# Patient Record
Sex: FEMALE | Race: WHITE
[De-identification: names, ages, dates, MRNs, and addresses within clinical notes are randomized per-mention and may not be internally consistent; named-entity substitution may affect disease eponyms.]

---

## 2019-01-01 ENCOUNTER — APPOINTMENT (OUTPATIENT)
Dept: TRAUMA SURGERY | Facility: CLINIC | Age: 84
End: 2019-01-01
Payer: MEDICARE

## 2019-01-01 ENCOUNTER — APPOINTMENT (OUTPATIENT)
Dept: UROLOGY | Facility: CLINIC | Age: 84
End: 2019-01-01
Payer: MEDICARE

## 2019-01-01 ENCOUNTER — TRANSCRIPTION ENCOUNTER (OUTPATIENT)
Age: 84
End: 2019-01-01

## 2019-01-01 ENCOUNTER — APPOINTMENT (OUTPATIENT)
Dept: UROLOGY | Facility: CLINIC | Age: 84
End: 2019-01-01

## 2019-01-01 ENCOUNTER — INPATIENT (INPATIENT)
Facility: HOSPITAL | Age: 84
LOS: 13 days | Discharge: HOME | DRG: 444 | End: 2019-05-30
Attending: FAMILY MEDICINE | Admitting: FAMILY MEDICINE
Payer: MEDICARE

## 2019-01-01 ENCOUNTER — APPOINTMENT (OUTPATIENT)
Dept: INTERVENTIONAL RADIOLOGY/VASCULAR | Facility: CLINIC | Age: 84
End: 2019-01-01

## 2019-01-01 ENCOUNTER — OUTPATIENT (OUTPATIENT)
Dept: OUTPATIENT SERVICES | Facility: HOSPITAL | Age: 84
LOS: 1 days | End: 2019-01-01
Payer: MEDICARE

## 2019-01-01 ENCOUNTER — RESULT REVIEW (OUTPATIENT)
Age: 84
End: 2019-01-01

## 2019-01-01 ENCOUNTER — EMERGENCY (EMERGENCY)
Facility: HOSPITAL | Age: 84
LOS: 1 days | Discharge: HOME | End: 2019-01-01
Attending: STUDENT IN AN ORGANIZED HEALTH CARE EDUCATION/TRAINING PROGRAM
Payer: MEDICARE

## 2019-01-01 ENCOUNTER — APPOINTMENT (OUTPATIENT)
Dept: TRAUMA SURGERY | Facility: CLINIC | Age: 84
End: 2019-01-01

## 2019-01-01 ENCOUNTER — INPATIENT (INPATIENT)
Facility: HOSPITAL | Age: 84
LOS: 1 days | Discharge: HOSPICE | DRG: 683 | End: 2019-09-05
Attending: FAMILY MEDICINE | Admitting: HOSPITALIST
Payer: MEDICARE

## 2019-01-01 VITALS — DIASTOLIC BLOOD PRESSURE: 64 MMHG | HEART RATE: 89 BPM | SYSTOLIC BLOOD PRESSURE: 94 MMHG

## 2019-01-01 VITALS
SYSTOLIC BLOOD PRESSURE: 108 MMHG | HEART RATE: 92 BPM | DIASTOLIC BLOOD PRESSURE: 59 MMHG | WEIGHT: 149.91 LBS | HEIGHT: 60 IN | TEMPERATURE: 98 F | OXYGEN SATURATION: 99 % | RESPIRATION RATE: 16 BRPM

## 2019-01-01 VITALS
SYSTOLIC BLOOD PRESSURE: 120 MMHG | OXYGEN SATURATION: 99 % | TEMPERATURE: 97.8 F | HEART RATE: 91 BPM | DIASTOLIC BLOOD PRESSURE: 78 MMHG

## 2019-01-01 VITALS
SYSTOLIC BLOOD PRESSURE: 104 MMHG | OXYGEN SATURATION: 97 % | RESPIRATION RATE: 18 BRPM | TEMPERATURE: 98 F | HEART RATE: 97 BPM | DIASTOLIC BLOOD PRESSURE: 56 MMHG | WEIGHT: 139.99 LBS | HEIGHT: 60 IN

## 2019-01-01 VITALS
OXYGEN SATURATION: 94 % | SYSTOLIC BLOOD PRESSURE: 100 MMHG | RESPIRATION RATE: 18 BRPM | HEART RATE: 88 BPM | TEMPERATURE: 99 F | DIASTOLIC BLOOD PRESSURE: 60 MMHG

## 2019-01-01 VITALS — SYSTOLIC BLOOD PRESSURE: 122 MMHG | HEART RATE: 99 BPM | DIASTOLIC BLOOD PRESSURE: 63 MMHG

## 2019-01-01 VITALS
TEMPERATURE: 98.2 F | SYSTOLIC BLOOD PRESSURE: 108 MMHG | OXYGEN SATURATION: 99 % | DIASTOLIC BLOOD PRESSURE: 71 MMHG | RESPIRATION RATE: 14 BRPM | HEART RATE: 90 BPM

## 2019-01-01 VITALS
HEIGHT: 59 IN | DIASTOLIC BLOOD PRESSURE: 66 MMHG | WEIGHT: 156.09 LBS | HEART RATE: 58 BPM | TEMPERATURE: 98 F | OXYGEN SATURATION: 96 % | RESPIRATION RATE: 18 BRPM | SYSTOLIC BLOOD PRESSURE: 100 MMHG

## 2019-01-01 VITALS
HEART RATE: 80 BPM | RESPIRATION RATE: 16 BRPM | OXYGEN SATURATION: 100 % | DIASTOLIC BLOOD PRESSURE: 48 MMHG | SYSTOLIC BLOOD PRESSURE: 117 MMHG

## 2019-01-01 VITALS
HEART RATE: 89 BPM | OXYGEN SATURATION: 96 % | TEMPERATURE: 98.4 F | DIASTOLIC BLOOD PRESSURE: 80 MMHG | SYSTOLIC BLOOD PRESSURE: 131 MMHG

## 2019-01-01 VITALS
BODY MASS INDEX: 29.01 KG/M2 | DIASTOLIC BLOOD PRESSURE: 84 MMHG | TEMPERATURE: 98.5 F | WEIGHT: 172 LBS | HEIGHT: 64.5 IN | SYSTOLIC BLOOD PRESSURE: 118 MMHG | HEART RATE: 91 BPM | OXYGEN SATURATION: 100 %

## 2019-01-01 VITALS
DIASTOLIC BLOOD PRESSURE: 61 MMHG | OXYGEN SATURATION: 97 % | TEMPERATURE: 98 F | RESPIRATION RATE: 18 BRPM | HEART RATE: 93 BPM | SYSTOLIC BLOOD PRESSURE: 104 MMHG

## 2019-01-01 VITALS
HEART RATE: 78 BPM | SYSTOLIC BLOOD PRESSURE: 96 MMHG | OXYGEN SATURATION: 95 % | HEIGHT: 67 IN | DIASTOLIC BLOOD PRESSURE: 58 MMHG | WEIGHT: 167.99 LBS | TEMPERATURE: 99 F | RESPIRATION RATE: 20 BRPM

## 2019-01-01 VITALS
TEMPERATURE: 98 F | OXYGEN SATURATION: 95 % | DIASTOLIC BLOOD PRESSURE: 63 MMHG | SYSTOLIC BLOOD PRESSURE: 102 MMHG | RESPIRATION RATE: 20 BRPM | HEART RATE: 73 BPM

## 2019-01-01 DIAGNOSIS — N17.9 ACUTE KIDNEY FAILURE, UNSPECIFIED: ICD-10-CM

## 2019-01-01 DIAGNOSIS — R53.2 FUNCTIONAL QUADRIPLEGIA: ICD-10-CM

## 2019-01-01 DIAGNOSIS — Z90.49 ACQUIRED ABSENCE OF OTHER SPECIFIED PARTS OF DIGESTIVE TRACT: Chronic | ICD-10-CM

## 2019-01-01 DIAGNOSIS — K65.1 PERITONEAL ABSCESS: ICD-10-CM

## 2019-01-01 DIAGNOSIS — Z51.5 ENCOUNTER FOR PALLIATIVE CARE: ICD-10-CM

## 2019-01-01 DIAGNOSIS — N28.89 OTHER SPECIFIED DISORDERS OF KIDNEY AND URETER: ICD-10-CM

## 2019-01-01 DIAGNOSIS — K80.13 CALCULUS OF GALLBLADDER WITH ACUTE AND CHRONIC CHOLECYSTITIS WITH OBSTRUCTION: ICD-10-CM

## 2019-01-01 DIAGNOSIS — N15.1 RENAL AND PERINEPHRIC ABSCESS: ICD-10-CM

## 2019-01-01 DIAGNOSIS — N39.0 URINARY TRACT INFECTION, SITE NOT SPECIFIED: ICD-10-CM

## 2019-01-01 DIAGNOSIS — N19 UNSPECIFIED KIDNEY FAILURE: ICD-10-CM

## 2019-01-01 DIAGNOSIS — E86.0 DEHYDRATION: ICD-10-CM

## 2019-01-01 DIAGNOSIS — Z87.19 PERSONAL HISTORY OF OTHER DISEASES OF THE DIGESTIVE SYSTEM: ICD-10-CM

## 2019-01-01 DIAGNOSIS — Z87.440 PERSONAL HISTORY OF URINARY (TRACT) INFECTIONS: ICD-10-CM

## 2019-01-01 DIAGNOSIS — E87.5 HYPERKALEMIA: ICD-10-CM

## 2019-01-01 DIAGNOSIS — E87.2 ACIDOSIS: ICD-10-CM

## 2019-01-01 DIAGNOSIS — M75.100 UNSPECIFIED ROTATOR CUFF TEAR OR RUPTURE OF UNSPECIFIED SHOULDER, NOT SPECIFIED AS TRAUMATIC: ICD-10-CM

## 2019-01-01 DIAGNOSIS — K81.9 CHOLECYSTITIS, UNSPECIFIED: ICD-10-CM

## 2019-01-01 LAB
-  AMIKACIN: SIGNIFICANT CHANGE UP
-  AMPICILLIN/SULBACTAM: SIGNIFICANT CHANGE UP
-  AMPICILLIN: SIGNIFICANT CHANGE UP
-  AZTREONAM: SIGNIFICANT CHANGE UP
-  CEFAZOLIN: SIGNIFICANT CHANGE UP
-  CEFEPIME: SIGNIFICANT CHANGE UP
-  CEFOXITIN: SIGNIFICANT CHANGE UP
-  CEFTRIAXONE: SIGNIFICANT CHANGE UP
-  CIPROFLOXACIN: SIGNIFICANT CHANGE UP
-  DAPTOMYCIN: SIGNIFICANT CHANGE UP
-  ERTAPENEM: SIGNIFICANT CHANGE UP
-  GENTAMICIN: SIGNIFICANT CHANGE UP
-  IMIPENEM: SIGNIFICANT CHANGE UP
-  LEVOFLOXACIN: SIGNIFICANT CHANGE UP
-  LEVOFLOXACIN: SIGNIFICANT CHANGE UP
-  LINEZOLID: SIGNIFICANT CHANGE UP
-  MEROPENEM: SIGNIFICANT CHANGE UP
-  PIPERACILLIN/TAZOBACTAM: SIGNIFICANT CHANGE UP
-  TETRACYCLINE: SIGNIFICANT CHANGE UP
-  TETRACYCLINE: SIGNIFICANT CHANGE UP
-  TOBRAMYCIN: SIGNIFICANT CHANGE UP
-  TRIMETHOPRIM/SULFAMETHOXAZOLE: SIGNIFICANT CHANGE UP
-  VANCOMYCIN: SIGNIFICANT CHANGE UP
-  VANCOMYCIN: SIGNIFICANT CHANGE UP
ALBUMIN SERPL ELPH-MCNC: 1.8 G/DL — LOW (ref 3.3–5.2)
ALBUMIN SERPL ELPH-MCNC: 2.1 G/DL — LOW (ref 3.3–5.2)
ALBUMIN SERPL ELPH-MCNC: 2.1 G/DL — LOW (ref 3.3–5.2)
ALBUMIN SERPL ELPH-MCNC: 2.7 G/DL — LOW (ref 3.3–5.2)
ALBUMIN SERPL ELPH-MCNC: 2.7 G/DL — LOW (ref 3.3–5.2)
ALBUMIN SERPL ELPH-MCNC: 3.1 G/DL — LOW (ref 3.3–5.2)
ALP SERPL-CCNC: 123 U/L — HIGH (ref 40–120)
ALP SERPL-CCNC: 127 U/L — HIGH (ref 40–120)
ALP SERPL-CCNC: 85 U/L — SIGNIFICANT CHANGE UP (ref 40–120)
ALP SERPL-CCNC: 87 U/L — SIGNIFICANT CHANGE UP (ref 40–120)
ALP SERPL-CCNC: 88 U/L — SIGNIFICANT CHANGE UP (ref 40–120)
ALP SERPL-CCNC: 92 U/L — SIGNIFICANT CHANGE UP (ref 40–120)
ALT FLD-CCNC: 10 U/L — SIGNIFICANT CHANGE UP
ALT FLD-CCNC: 10 U/L — SIGNIFICANT CHANGE UP
ALT FLD-CCNC: 12 U/L — SIGNIFICANT CHANGE UP
ALT FLD-CCNC: 15 U/L — SIGNIFICANT CHANGE UP
ALT FLD-CCNC: 19 U/L — SIGNIFICANT CHANGE UP
ALT FLD-CCNC: 9 U/L — SIGNIFICANT CHANGE UP
ANION GAP SERPL CALC-SCNC: 11 MMOL/L — SIGNIFICANT CHANGE UP (ref 5–17)
ANION GAP SERPL CALC-SCNC: 11 MMOL/L — SIGNIFICANT CHANGE UP (ref 5–17)
ANION GAP SERPL CALC-SCNC: 12 MMOL/L — SIGNIFICANT CHANGE UP (ref 5–17)
ANION GAP SERPL CALC-SCNC: 13 MMOL/L — SIGNIFICANT CHANGE UP (ref 5–17)
ANION GAP SERPL CALC-SCNC: 14 MMOL/L — SIGNIFICANT CHANGE UP (ref 5–17)
ANION GAP SERPL CALC-SCNC: 17 MMOL/L — SIGNIFICANT CHANGE UP (ref 5–17)
ANION GAP SERPL CALC-SCNC: 17 MMOL/L — SIGNIFICANT CHANGE UP (ref 5–17)
ANION GAP SERPL CALC-SCNC: 21 MMOL/L — HIGH (ref 5–17)
ANION GAP SERPL CALC-SCNC: 22 MMOL/L — HIGH (ref 5–17)
ANION GAP SERPL CALC-SCNC: 9 MMOL/L — SIGNIFICANT CHANGE UP (ref 5–17)
APPEARANCE UR: ABNORMAL
APPEARANCE UR: CLEAR — SIGNIFICANT CHANGE UP
APTT BLD: 28.7 SEC — SIGNIFICANT CHANGE UP (ref 27.5–36.3)
APTT BLD: 33.4 SEC — SIGNIFICANT CHANGE UP (ref 27.5–36.3)
AST SERPL-CCNC: 11 U/L — SIGNIFICANT CHANGE UP
AST SERPL-CCNC: 12 U/L — SIGNIFICANT CHANGE UP
AST SERPL-CCNC: 13 U/L — SIGNIFICANT CHANGE UP
AST SERPL-CCNC: 14 U/L — SIGNIFICANT CHANGE UP
AST SERPL-CCNC: 18 U/L — SIGNIFICANT CHANGE UP
AST SERPL-CCNC: 21 U/L — SIGNIFICANT CHANGE UP
BACTERIA # UR AUTO: ABNORMAL
BACTERIA # UR AUTO: ABNORMAL
BASE EXCESS BLDV CALC-SCNC: -21.5 MMOL/L — LOW (ref -2–2)
BASOPHILS # BLD AUTO: 0.03 K/UL — SIGNIFICANT CHANGE UP (ref 0–0.2)
BASOPHILS # BLD AUTO: 0.05 K/UL — SIGNIFICANT CHANGE UP (ref 0–0.2)
BASOPHILS NFR BLD AUTO: 0.3 % — SIGNIFICANT CHANGE UP (ref 0–2)
BASOPHILS NFR BLD AUTO: 0.6 % — SIGNIFICANT CHANGE UP (ref 0–2)
BILIRUB DIRECT SERPL-MCNC: 0.6 MG/DL — HIGH (ref 0–0.3)
BILIRUB FLD-MCNC: 0.6 MG/DL — SIGNIFICANT CHANGE UP
BILIRUB INDIRECT FLD-MCNC: 0.2 MG/DL — SIGNIFICANT CHANGE UP (ref 0.2–1)
BILIRUB SERPL-MCNC: 0.2 MG/DL — LOW (ref 0.4–2)
BILIRUB SERPL-MCNC: 0.3 MG/DL — LOW (ref 0.4–2)
BILIRUB SERPL-MCNC: 0.8 MG/DL — SIGNIFICANT CHANGE UP (ref 0.4–2)
BILIRUB SERPL-MCNC: 1.1 MG/DL — SIGNIFICANT CHANGE UP (ref 0.4–2)
BILIRUB SERPL-MCNC: 1.3 MG/DL — SIGNIFICANT CHANGE UP (ref 0.4–2)
BILIRUB UR-MCNC: ABNORMAL
BILIRUB UR-MCNC: NEGATIVE — SIGNIFICANT CHANGE UP
BLD GP AB SCN SERPL QL: SIGNIFICANT CHANGE UP
BLD GP AB SCN SERPL QL: SIGNIFICANT CHANGE UP
BUN SERPL-MCNC: 124 MG/DL — HIGH (ref 8–20)
BUN SERPL-MCNC: 132 MG/DL — HIGH (ref 8–20)
BUN SERPL-MCNC: 23 MG/DL — HIGH (ref 8–20)
BUN SERPL-MCNC: 28 MG/DL — HIGH (ref 8–20)
BUN SERPL-MCNC: 35 MG/DL — HIGH (ref 8–20)
BUN SERPL-MCNC: 37 MG/DL — HIGH (ref 8–20)
BUN SERPL-MCNC: 40 MG/DL — HIGH (ref 8–20)
BUN SERPL-MCNC: 51 MG/DL — HIGH (ref 8–20)
BUN SERPL-MCNC: 53 MG/DL — HIGH (ref 8–20)
BUN SERPL-MCNC: 57 MG/DL — HIGH (ref 8–20)
BUN SERPL-MCNC: 76 MG/DL — HIGH (ref 8–20)
BUN SERPL-MCNC: 79 MG/DL — HIGH (ref 8–20)
BUN SERPL-MCNC: 83 MG/DL — HIGH (ref 8–20)
BUN SERPL-MCNC: 94 MG/DL — HIGH (ref 8–20)
CA-I SERPL-SCNC: 1.22 MMOL/L — SIGNIFICANT CHANGE UP (ref 1.15–1.33)
CALCIUM SERPL-MCNC: 10.1 MG/DL — SIGNIFICANT CHANGE UP (ref 8.4–10.5)
CALCIUM SERPL-MCNC: 10.1 MG/DL — SIGNIFICANT CHANGE UP (ref 8.6–10.2)
CALCIUM SERPL-MCNC: 10.2 MG/DL — SIGNIFICANT CHANGE UP (ref 8.6–10.2)
CALCIUM SERPL-MCNC: 11.1 MG/DL — HIGH (ref 8.6–10.2)
CALCIUM SERPL-MCNC: 11.3 MG/DL — HIGH (ref 8.6–10.2)
CALCIUM SERPL-MCNC: 8.5 MG/DL — LOW (ref 8.6–10.2)
CALCIUM SERPL-MCNC: 9.3 MG/DL — SIGNIFICANT CHANGE UP (ref 8.6–10.2)
CALCIUM SERPL-MCNC: 9.5 MG/DL — SIGNIFICANT CHANGE UP (ref 8.6–10.2)
CALCIUM SERPL-MCNC: 9.7 MG/DL — SIGNIFICANT CHANGE UP (ref 8.6–10.2)
CALCIUM SERPL-MCNC: 9.8 MG/DL — SIGNIFICANT CHANGE UP (ref 8.6–10.2)
CALCIUM SERPL-MCNC: 9.9 MG/DL — SIGNIFICANT CHANGE UP (ref 8.6–10.2)
CHLORIDE BLDV-SCNC: 115 MMOL/L — HIGH (ref 98–107)
CHLORIDE SERPL-SCNC: 102 MMOL/L — SIGNIFICANT CHANGE UP (ref 98–107)
CHLORIDE SERPL-SCNC: 104 MMOL/L — SIGNIFICANT CHANGE UP (ref 98–107)
CHLORIDE SERPL-SCNC: 106 MMOL/L — SIGNIFICANT CHANGE UP (ref 98–107)
CHLORIDE SERPL-SCNC: 107 MMOL/L — SIGNIFICANT CHANGE UP (ref 98–107)
CHLORIDE SERPL-SCNC: 108 MMOL/L — HIGH (ref 98–107)
CHLORIDE SERPL-SCNC: 109 MMOL/L — HIGH (ref 98–107)
CHLORIDE SERPL-SCNC: 99 MMOL/L — SIGNIFICANT CHANGE UP (ref 98–107)
CK SERPL-CCNC: 9 U/L — LOW (ref 25–170)
CO2 SERPL-SCNC: 15 MMOL/L — LOW (ref 22–29)
CO2 SERPL-SCNC: 17 MMOL/L — LOW (ref 22–29)
CO2 SERPL-SCNC: 19 MMOL/L — LOW (ref 22–29)
CO2 SERPL-SCNC: 19 MMOL/L — LOW (ref 22–29)
CO2 SERPL-SCNC: 20 MMOL/L — LOW (ref 22–29)
CO2 SERPL-SCNC: 23 MMOL/L — SIGNIFICANT CHANGE UP (ref 22–29)
CO2 SERPL-SCNC: 25 MMOL/L — SIGNIFICANT CHANGE UP (ref 22–29)
CO2 SERPL-SCNC: 25 MMOL/L — SIGNIFICANT CHANGE UP (ref 22–29)
CO2 SERPL-SCNC: 27 MMOL/L — SIGNIFICANT CHANGE UP (ref 22–29)
CO2 SERPL-SCNC: 27 MMOL/L — SIGNIFICANT CHANGE UP (ref 22–29)
CO2 SERPL-SCNC: 29 MMOL/L — SIGNIFICANT CHANGE UP (ref 22–29)
CO2 SERPL-SCNC: 29 MMOL/L — SIGNIFICANT CHANGE UP (ref 22–29)
CO2 SERPL-SCNC: 6 MMOL/L — CRITICAL LOW (ref 22–29)
CO2 SERPL-SCNC: 8 MMOL/L — CRITICAL LOW (ref 22–29)
COLOR SPEC: YELLOW — SIGNIFICANT CHANGE UP
COLOR SPEC: YELLOW — SIGNIFICANT CHANGE UP
COMMENT - URINE: SIGNIFICANT CHANGE UP
CREAT ?TM UR-MCNC: 95 MG/DL — SIGNIFICANT CHANGE UP
CREAT SERPL-MCNC: 0.96 MG/DL — SIGNIFICANT CHANGE UP (ref 0.5–1.3)
CREAT SERPL-MCNC: 0.97 MG/DL — SIGNIFICANT CHANGE UP (ref 0.5–1.3)
CREAT SERPL-MCNC: 1 MG/DL — SIGNIFICANT CHANGE UP (ref 0.5–1.3)
CREAT SERPL-MCNC: 1.03 MG/DL — SIGNIFICANT CHANGE UP (ref 0.5–1.3)
CREAT SERPL-MCNC: 1.09 MG/DL — SIGNIFICANT CHANGE UP (ref 0.5–1.3)
CREAT SERPL-MCNC: 1.28 MG/DL — SIGNIFICANT CHANGE UP (ref 0.5–1.3)
CREAT SERPL-MCNC: 1.47 MG/DL — HIGH (ref 0.5–1.3)
CREAT SERPL-MCNC: 1.5 MG/DL — HIGH (ref 0.5–1.3)
CREAT SERPL-MCNC: 2.15 MG/DL — HIGH (ref 0.5–1.3)
CREAT SERPL-MCNC: 2.7 MG/DL — HIGH (ref 0.5–1.3)
CREAT SERPL-MCNC: 2.73 MG/DL — HIGH (ref 0.5–1.3)
CREAT SERPL-MCNC: 3.14 MG/DL — HIGH (ref 0.5–1.3)
CREAT SERPL-MCNC: 7.63 MG/DL — HIGH (ref 0.5–1.3)
CREAT SERPL-MCNC: 8.04 MG/DL — HIGH (ref 0.5–1.3)
CULTURE RESULTS: SIGNIFICANT CHANGE UP
DIFF PNL FLD: ABNORMAL
DIFF PNL FLD: ABNORMAL
EOSINOPHIL # BLD AUTO: 0 K/UL — SIGNIFICANT CHANGE UP (ref 0–0.5)
EOSINOPHIL # BLD AUTO: 0.1 K/UL — SIGNIFICANT CHANGE UP (ref 0–0.5)
EOSINOPHIL # BLD AUTO: 0.22 K/UL — SIGNIFICANT CHANGE UP (ref 0–0.5)
EOSINOPHIL # BLD AUTO: 0.26 K/UL — SIGNIFICANT CHANGE UP (ref 0–0.5)
EOSINOPHIL NFR BLD AUTO: 0.2 % — SIGNIFICANT CHANGE UP (ref 0–6)
EOSINOPHIL NFR BLD AUTO: 0.5 % — SIGNIFICANT CHANGE UP (ref 0–6)
EOSINOPHIL NFR BLD AUTO: 2 % — SIGNIFICANT CHANGE UP (ref 0–5)
EOSINOPHIL NFR BLD AUTO: 2.3 % — SIGNIFICANT CHANGE UP (ref 0–6)
EOSINOPHIL NFR BLD AUTO: 2.9 % — SIGNIFICANT CHANGE UP (ref 0–6)
EOSINOPHIL NFR URNS MANUAL: SIGNIFICANT CHANGE UP
EPI CELLS # UR: ABNORMAL
EPI CELLS # UR: SIGNIFICANT CHANGE UP
GAS PNL BLDV: 140 MMOL/L — SIGNIFICANT CHANGE UP (ref 135–145)
GAS PNL BLDV: SIGNIFICANT CHANGE UP
GAS PNL BLDV: SIGNIFICANT CHANGE UP
GLUCOSE BLDV-MCNC: 94 MG/DL — SIGNIFICANT CHANGE UP (ref 70–99)
GLUCOSE SERPL-MCNC: 101 MG/DL — SIGNIFICANT CHANGE UP (ref 70–115)
GLUCOSE SERPL-MCNC: 103 MG/DL — SIGNIFICANT CHANGE UP (ref 70–115)
GLUCOSE SERPL-MCNC: 111 MG/DL — SIGNIFICANT CHANGE UP (ref 70–115)
GLUCOSE SERPL-MCNC: 111 MG/DL — SIGNIFICANT CHANGE UP (ref 70–115)
GLUCOSE SERPL-MCNC: 126 MG/DL — HIGH (ref 70–115)
GLUCOSE SERPL-MCNC: 143 MG/DL — HIGH (ref 70–115)
GLUCOSE SERPL-MCNC: 145 MG/DL — HIGH (ref 70–115)
GLUCOSE SERPL-MCNC: 148 MG/DL — HIGH (ref 70–115)
GLUCOSE SERPL-MCNC: 89 MG/DL — SIGNIFICANT CHANGE UP (ref 70–115)
GLUCOSE SERPL-MCNC: 92 MG/DL — SIGNIFICANT CHANGE UP (ref 70–115)
GLUCOSE SERPL-MCNC: 95 MG/DL — SIGNIFICANT CHANGE UP (ref 70–115)
GLUCOSE SERPL-MCNC: 97 MG/DL — SIGNIFICANT CHANGE UP (ref 70–115)
GLUCOSE SERPL-MCNC: 98 MG/DL — SIGNIFICANT CHANGE UP (ref 70–115)
GLUCOSE SERPL-MCNC: 99 MG/DL — SIGNIFICANT CHANGE UP (ref 70–115)
GLUCOSE UR QL: 50 MG/DL
GLUCOSE UR QL: NEGATIVE MG/DL — SIGNIFICANT CHANGE UP
GRAM STN FLD: SIGNIFICANT CHANGE UP
HBA1C BLD-MCNC: 5.2 % — SIGNIFICANT CHANGE UP (ref 4–5.6)
HCO3 BLDV-SCNC: 9 MMOL/L — LOW (ref 20–26)
HCT VFR BLD CALC: 31 % — LOW (ref 37–47)
HCT VFR BLD CALC: 32.3 % — LOW (ref 34.5–45)
HCT VFR BLD CALC: 32.7 % — LOW (ref 34.5–45)
HCT VFR BLD CALC: 32.9 % — LOW (ref 37–47)
HCT VFR BLD CALC: 33.2 % — LOW (ref 37–47)
HCT VFR BLD CALC: 33.9 % — LOW (ref 37–47)
HCT VFR BLD CALC: 34.2 % — LOW (ref 37–47)
HCT VFR BLD CALC: 34.8 % — LOW (ref 37–47)
HCT VFR BLD CALC: 35.4 % — LOW (ref 37–47)
HCT VFR BLD CALC: 39.5 % — SIGNIFICANT CHANGE UP (ref 37–47)
HCT VFR BLDA CALC: 33 — LOW (ref 39–50)
HGB BLD CALC-MCNC: 10.7 G/DL — LOW (ref 11.5–15.5)
HGB BLD-MCNC: 10.1 G/DL — LOW (ref 12–16)
HGB BLD-MCNC: 10.2 G/DL — LOW (ref 11.5–15.5)
HGB BLD-MCNC: 10.7 G/DL — LOW (ref 11.5–15.5)
HGB BLD-MCNC: 10.8 G/DL — LOW (ref 12–16)
HGB BLD-MCNC: 10.8 G/DL — LOW (ref 12–16)
HGB BLD-MCNC: 11.2 G/DL — LOW (ref 12–16)
HGB BLD-MCNC: 11.6 G/DL — LOW (ref 12–16)
HGB BLD-MCNC: 11.6 G/DL — LOW (ref 12–16)
HGB BLD-MCNC: 12.1 G/DL — SIGNIFICANT CHANGE UP (ref 12–16)
HGB BLD-MCNC: 13.2 G/DL — SIGNIFICANT CHANGE UP (ref 12–16)
HYALINE CASTS # UR AUTO: ABNORMAL /LPF
IMM GRANULOCYTES NFR BLD AUTO: 0.9 % — SIGNIFICANT CHANGE UP (ref 0–1.5)
IMM GRANULOCYTES NFR BLD AUTO: 1 % — SIGNIFICANT CHANGE UP (ref 0–1.5)
INR BLD: 1.08 RATIO — SIGNIFICANT CHANGE UP (ref 0.88–1.16)
INR BLD: 1.12 RATIO — SIGNIFICANT CHANGE UP (ref 0.88–1.16)
INTERPRETATION SERPL IFE-IMP: SIGNIFICANT CHANGE UP
KETONES UR-MCNC: NEGATIVE — SIGNIFICANT CHANGE UP
KETONES UR-MCNC: NEGATIVE — SIGNIFICANT CHANGE UP
LACTATE BLDV-MCNC: 0.7 MMOL/L — SIGNIFICANT CHANGE UP (ref 0.5–2)
LACTATE BLDV-MCNC: 1.2 MMOL/L — SIGNIFICANT CHANGE UP (ref 0.5–2)
LEUKOCYTE ESTERASE UR-ACNC: ABNORMAL
LEUKOCYTE ESTERASE UR-ACNC: ABNORMAL
LIDOCAIN IGE QN: 14 U/L — LOW (ref 22–51)
LYMPHOCYTES # BLD AUTO: 0.4 K/UL — LOW (ref 1–4.8)
LYMPHOCYTES # BLD AUTO: 0.5 K/UL — LOW (ref 1–4.8)
LYMPHOCYTES # BLD AUTO: 1.67 K/UL — SIGNIFICANT CHANGE UP (ref 1–3.3)
LYMPHOCYTES # BLD AUTO: 1.89 K/UL — SIGNIFICANT CHANGE UP (ref 1–3.3)
LYMPHOCYTES # BLD AUTO: 18.9 % — SIGNIFICANT CHANGE UP (ref 13–44)
LYMPHOCYTES # BLD AUTO: 19.7 % — SIGNIFICANT CHANGE UP (ref 13–44)
LYMPHOCYTES # BLD AUTO: 3.7 % — LOW (ref 20–55)
LYMPHOCYTES # BLD AUTO: 4.5 % — LOW (ref 20–55)
LYMPHOCYTES # BLD AUTO: 5 % — LOW (ref 20–55)
MACROCYTES BLD QL: SLIGHT — SIGNIFICANT CHANGE UP
MAGNESIUM SERPL-MCNC: 1.4 MG/DL — LOW (ref 1.6–2.6)
MAGNESIUM SERPL-MCNC: 1.5 MG/DL — LOW (ref 1.6–2.6)
MAGNESIUM SERPL-MCNC: 1.6 MG/DL — SIGNIFICANT CHANGE UP (ref 1.6–2.6)
MAGNESIUM SERPL-MCNC: 1.7 MG/DL — SIGNIFICANT CHANGE UP (ref 1.6–2.6)
MAGNESIUM SERPL-MCNC: 1.7 MG/DL — SIGNIFICANT CHANGE UP (ref 1.6–2.6)
MAGNESIUM SERPL-MCNC: 1.9 MG/DL — SIGNIFICANT CHANGE UP (ref 1.8–2.6)
MCHC RBC-ENTMCNC: 30.3 PG — SIGNIFICANT CHANGE UP (ref 27–31)
MCHC RBC-ENTMCNC: 30.6 PG — SIGNIFICANT CHANGE UP (ref 27–31)
MCHC RBC-ENTMCNC: 30.9 PG — SIGNIFICANT CHANGE UP (ref 27–31)
MCHC RBC-ENTMCNC: 30.9 PG — SIGNIFICANT CHANGE UP (ref 27–31)
MCHC RBC-ENTMCNC: 31.1 PG — HIGH (ref 27–31)
MCHC RBC-ENTMCNC: 31.1 PG — HIGH (ref 27–31)
MCHC RBC-ENTMCNC: 31.2 PG — HIGH (ref 27–31)
MCHC RBC-ENTMCNC: 31.3 PG — SIGNIFICANT CHANGE UP (ref 27–34)
MCHC RBC-ENTMCNC: 31.3 PG — SIGNIFICANT CHANGE UP (ref 27–34)
MCHC RBC-ENTMCNC: 31.6 GM/DL — LOW (ref 32–36)
MCHC RBC-ENTMCNC: 31.7 PG — HIGH (ref 27–31)
MCHC RBC-ENTMCNC: 31.9 G/DL — LOW (ref 32–36)
MCHC RBC-ENTMCNC: 32.5 G/DL — SIGNIFICANT CHANGE UP (ref 32–36)
MCHC RBC-ENTMCNC: 32.6 G/DL — SIGNIFICANT CHANGE UP (ref 32–36)
MCHC RBC-ENTMCNC: 32.7 GM/DL — SIGNIFICANT CHANGE UP (ref 32–36)
MCHC RBC-ENTMCNC: 33.3 G/DL — SIGNIFICANT CHANGE UP (ref 32–36)
MCHC RBC-ENTMCNC: 33.4 G/DL — SIGNIFICANT CHANGE UP (ref 32–36)
MCHC RBC-ENTMCNC: 33.9 G/DL — SIGNIFICANT CHANGE UP (ref 32–36)
MCHC RBC-ENTMCNC: 34 G/DL — SIGNIFICANT CHANGE UP (ref 32–36)
MCHC RBC-ENTMCNC: 34.2 G/DL — SIGNIFICANT CHANGE UP (ref 32–36)
MCV RBC AUTO: 91.6 FL — SIGNIFICANT CHANGE UP (ref 81–99)
MCV RBC AUTO: 91.7 FL — SIGNIFICANT CHANGE UP (ref 81–99)
MCV RBC AUTO: 92.5 FL — SIGNIFICANT CHANGE UP (ref 81–99)
MCV RBC AUTO: 92.6 FL — SIGNIFICANT CHANGE UP (ref 81–99)
MCV RBC AUTO: 92.7 FL — SIGNIFICANT CHANGE UP (ref 81–99)
MCV RBC AUTO: 94.1 FL — SIGNIFICANT CHANGE UP (ref 81–99)
MCV RBC AUTO: 95 FL — SIGNIFICANT CHANGE UP (ref 81–99)
MCV RBC AUTO: 95.4 FL — SIGNIFICANT CHANGE UP (ref 81–99)
MCV RBC AUTO: 95.6 FL — SIGNIFICANT CHANGE UP (ref 80–100)
MCV RBC AUTO: 99.1 FL — SIGNIFICANT CHANGE UP (ref 80–100)
METHOD TYPE: SIGNIFICANT CHANGE UP
MISCELLANEOUS TEST NAME: SIGNIFICANT CHANGE UP
MONOCYTES # BLD AUTO: 0.68 K/UL — SIGNIFICANT CHANGE UP (ref 0–0.9)
MONOCYTES # BLD AUTO: 0.9 K/UL — HIGH (ref 0–0.8)
MONOCYTES # BLD AUTO: 0.9 K/UL — SIGNIFICANT CHANGE UP (ref 0–0.9)
MONOCYTES # BLD AUTO: 1.1 K/UL — HIGH (ref 0–0.8)
MONOCYTES NFR BLD AUTO: 11.1 % — HIGH (ref 3–10)
MONOCYTES NFR BLD AUTO: 6.2 % — SIGNIFICANT CHANGE UP (ref 3–10)
MONOCYTES NFR BLD AUTO: 7 % — SIGNIFICANT CHANGE UP (ref 3–10)
MONOCYTES NFR BLD AUTO: 7.7 % — SIGNIFICANT CHANGE UP (ref 2–14)
MONOCYTES NFR BLD AUTO: 9.4 % — SIGNIFICANT CHANGE UP (ref 2–14)
MRSA PCR RESULT.: DETECTED
NEUTROPHILS # BLD AUTO: 12.9 K/UL — HIGH (ref 1.8–8)
NEUTROPHILS # BLD AUTO: 6.07 K/UL — SIGNIFICANT CHANGE UP (ref 1.8–7.4)
NEUTROPHILS # BLD AUTO: 6.45 K/UL — SIGNIFICANT CHANGE UP (ref 1.8–7.4)
NEUTROPHILS # BLD AUTO: 8.4 K/UL — HIGH (ref 1.8–8)
NEUTROPHILS NFR BLD AUTO: 67.4 % — SIGNIFICANT CHANGE UP (ref 43–77)
NEUTROPHILS NFR BLD AUTO: 68.9 % — SIGNIFICANT CHANGE UP (ref 43–77)
NEUTROPHILS NFR BLD AUTO: 83.7 % — HIGH (ref 37–73)
NEUTROPHILS NFR BLD AUTO: 86 % — HIGH (ref 37–73)
NEUTROPHILS NFR BLD AUTO: 88.6 % — HIGH (ref 37–73)
NIGHT BLUE STAIN TISS: SIGNIFICANT CHANGE UP
NITRITE UR-MCNC: NEGATIVE — SIGNIFICANT CHANGE UP
NITRITE UR-MCNC: NEGATIVE — SIGNIFICANT CHANGE UP
NT-PROBNP SERPL-SCNC: 2638 PG/ML — HIGH (ref 0–300)
ORGANISM # SPEC MICROSCOPIC CNT: SIGNIFICANT CHANGE UP
OTHER CELLS CSF MANUAL: 15 ML/DL — LOW (ref 18–22)
PCO2 BLDV: 16 MMHG — LOW (ref 35–50)
PH BLDV: 7.14 — CRITICAL LOW (ref 7.32–7.43)
PH UR: 5 — SIGNIFICANT CHANGE UP (ref 5–8)
PH UR: 5 — SIGNIFICANT CHANGE UP (ref 5–8)
PHOSPHATE SERPL-MCNC: 2.6 MG/DL — SIGNIFICANT CHANGE UP (ref 2.4–4.7)
PHOSPHATE SERPL-MCNC: 2.8 MG/DL — SIGNIFICANT CHANGE UP (ref 2.4–4.7)
PHOSPHATE SERPL-MCNC: 3 MG/DL — SIGNIFICANT CHANGE UP (ref 2.4–4.7)
PHOSPHATE SERPL-MCNC: 4.1 MG/DL — SIGNIFICANT CHANGE UP (ref 2.4–4.7)
PHOSPHATE SERPL-MCNC: 9.3 MG/DL — HIGH (ref 2.4–4.7)
PLAT MORPH BLD: NORMAL — SIGNIFICANT CHANGE UP
PLATELET # BLD AUTO: 188 K/UL — SIGNIFICANT CHANGE UP (ref 150–400)
PLATELET # BLD AUTO: 203 K/UL — SIGNIFICANT CHANGE UP (ref 150–400)
PLATELET # BLD AUTO: 220 K/UL — SIGNIFICANT CHANGE UP (ref 150–400)
PLATELET # BLD AUTO: 231 K/UL — SIGNIFICANT CHANGE UP (ref 150–400)
PLATELET # BLD AUTO: 243 K/UL — SIGNIFICANT CHANGE UP (ref 150–400)
PLATELET # BLD AUTO: 247 K/UL — SIGNIFICANT CHANGE UP (ref 150–400)
PLATELET # BLD AUTO: 312 K/UL — SIGNIFICANT CHANGE UP (ref 150–400)
PLATELET # BLD AUTO: 330 K/UL — SIGNIFICANT CHANGE UP (ref 150–400)
PLATELET # BLD AUTO: 368 K/UL — SIGNIFICANT CHANGE UP (ref 150–400)
PLATELET # BLD AUTO: 447 K/UL — HIGH (ref 150–400)
PO2 BLDV: 236 MMHG — HIGH (ref 25–45)
POLYCHROMASIA BLD QL SMEAR: SLIGHT — SIGNIFICANT CHANGE UP
POTASSIUM BLDV-SCNC: 5.1 MMOL/L — HIGH (ref 3.4–4.5)
POTASSIUM SERPL-MCNC: 3.5 MMOL/L — SIGNIFICANT CHANGE UP (ref 3.5–5.3)
POTASSIUM SERPL-MCNC: 3.6 MMOL/L — SIGNIFICANT CHANGE UP (ref 3.5–5.3)
POTASSIUM SERPL-MCNC: 3.7 MMOL/L — SIGNIFICANT CHANGE UP (ref 3.5–5.3)
POTASSIUM SERPL-MCNC: 3.7 MMOL/L — SIGNIFICANT CHANGE UP (ref 3.5–5.3)
POTASSIUM SERPL-MCNC: 3.8 MMOL/L — SIGNIFICANT CHANGE UP (ref 3.5–5.3)
POTASSIUM SERPL-MCNC: 3.9 MMOL/L — SIGNIFICANT CHANGE UP (ref 3.5–5.3)
POTASSIUM SERPL-MCNC: 4 MMOL/L — SIGNIFICANT CHANGE UP (ref 3.5–5.3)
POTASSIUM SERPL-MCNC: 4.3 MMOL/L — SIGNIFICANT CHANGE UP (ref 3.5–5.3)
POTASSIUM SERPL-MCNC: 4.5 MMOL/L — SIGNIFICANT CHANGE UP (ref 3.5–5.3)
POTASSIUM SERPL-MCNC: 5.9 MMOL/L — HIGH (ref 3.5–5.3)
POTASSIUM SERPL-SCNC: 3.5 MMOL/L — SIGNIFICANT CHANGE UP (ref 3.5–5.3)
POTASSIUM SERPL-SCNC: 3.6 MMOL/L — SIGNIFICANT CHANGE UP (ref 3.5–5.3)
POTASSIUM SERPL-SCNC: 3.7 MMOL/L — SIGNIFICANT CHANGE UP (ref 3.5–5.3)
POTASSIUM SERPL-SCNC: 3.7 MMOL/L — SIGNIFICANT CHANGE UP (ref 3.5–5.3)
POTASSIUM SERPL-SCNC: 3.8 MMOL/L — SIGNIFICANT CHANGE UP (ref 3.5–5.3)
POTASSIUM SERPL-SCNC: 3.9 MMOL/L — SIGNIFICANT CHANGE UP (ref 3.5–5.3)
POTASSIUM SERPL-SCNC: 4 MMOL/L — SIGNIFICANT CHANGE UP (ref 3.5–5.3)
POTASSIUM SERPL-SCNC: 4.3 MMOL/L — SIGNIFICANT CHANGE UP (ref 3.5–5.3)
POTASSIUM SERPL-SCNC: 4.5 MMOL/L — SIGNIFICANT CHANGE UP (ref 3.5–5.3)
POTASSIUM SERPL-SCNC: 5.9 MMOL/L — HIGH (ref 3.5–5.3)
PROCALCITONIN SERPL-MCNC: 0.84 NG/ML — HIGH (ref 0.02–0.1)
PROCALCITONIN SERPL-MCNC: 2.43 NG/ML — HIGH (ref 0.02–0.1)
PROT SERPL-MCNC: 5.2 G/DL — LOW (ref 6.6–8.7)
PROT SERPL-MCNC: 5.4 G/DL — LOW (ref 6.6–8.7)
PROT SERPL-MCNC: 5.4 G/DL — LOW (ref 6.6–8.7)
PROT SERPL-MCNC: 6.4 G/DL — LOW (ref 6.6–8.7)
PROT SERPL-MCNC: 6.9 G/DL — SIGNIFICANT CHANGE UP (ref 6.6–8.7)
PROT SERPL-MCNC: 6.9 G/DL — SIGNIFICANT CHANGE UP (ref 6.6–8.7)
PROT UR-MCNC: 30 MG/DL
PROT UR-MCNC: 30 MG/DL
PROTHROM AB SERPL-ACNC: 12.4 SEC — SIGNIFICANT CHANGE UP (ref 10–12.9)
PROTHROM AB SERPL-ACNC: 12.9 SEC — SIGNIFICANT CHANGE UP (ref 10–12.9)
PTH-INTACT FLD-MCNC: 30 PG/ML — SIGNIFICANT CHANGE UP (ref 15–65)
RBC # BLD: 3.25 M/UL — LOW (ref 4.4–5.2)
RBC # BLD: 3.26 M/UL — LOW (ref 3.8–5.2)
RBC # BLD: 3.42 M/UL — LOW (ref 3.8–5.2)
RBC # BLD: 3.53 M/UL — LOW (ref 4.4–5.2)
RBC # BLD: 3.57 M/UL — LOW (ref 4.4–5.2)
RBC # BLD: 3.59 M/UL — LOW (ref 4.4–5.2)
RBC # BLD: 3.73 M/UL — LOW (ref 4.4–5.2)
RBC # BLD: 3.76 M/UL — LOW (ref 4.4–5.2)
RBC # BLD: 3.82 M/UL — LOW (ref 4.4–5.2)
RBC # BLD: 4.27 M/UL — LOW (ref 4.4–5.2)
RBC # FLD: 13.7 % — SIGNIFICANT CHANGE UP (ref 11–15.6)
RBC # FLD: 13.7 % — SIGNIFICANT CHANGE UP (ref 11–15.6)
RBC # FLD: 13.8 % — SIGNIFICANT CHANGE UP (ref 11–15.6)
RBC # FLD: 13.9 % — SIGNIFICANT CHANGE UP (ref 11–15.6)
RBC # FLD: 13.9 % — SIGNIFICANT CHANGE UP (ref 11–15.6)
RBC # FLD: 14.1 % — SIGNIFICANT CHANGE UP (ref 11–15.6)
RBC # FLD: 14.4 % — SIGNIFICANT CHANGE UP (ref 10.3–14.5)
RBC # FLD: 14.6 % — HIGH (ref 10.3–14.5)
RBC BLD AUTO: ABNORMAL
RBC CASTS # UR COMP ASSIST: ABNORMAL /HPF (ref 0–4)
RBC CASTS # UR COMP ASSIST: SIGNIFICANT CHANGE UP /HPF (ref 0–4)
S AUREUS DNA NOSE QL NAA+PROBE: DETECTED
SAO2 % BLDV: 100 % — SIGNIFICANT CHANGE UP
SODIUM SERPL-SCNC: 131 MMOL/L — LOW (ref 135–145)
SODIUM SERPL-SCNC: 135 MMOL/L — SIGNIFICANT CHANGE UP (ref 135–145)
SODIUM SERPL-SCNC: 136 MMOL/L — SIGNIFICANT CHANGE UP (ref 135–145)
SODIUM SERPL-SCNC: 136 MMOL/L — SIGNIFICANT CHANGE UP (ref 135–145)
SODIUM SERPL-SCNC: 138 MMOL/L — SIGNIFICANT CHANGE UP (ref 135–145)
SODIUM SERPL-SCNC: 139 MMOL/L — SIGNIFICANT CHANGE UP (ref 135–145)
SODIUM SERPL-SCNC: 140 MMOL/L — SIGNIFICANT CHANGE UP (ref 135–145)
SODIUM SERPL-SCNC: 140 MMOL/L — SIGNIFICANT CHANGE UP (ref 135–145)
SODIUM SERPL-SCNC: 141 MMOL/L — SIGNIFICANT CHANGE UP (ref 135–145)
SODIUM SERPL-SCNC: 142 MMOL/L — SIGNIFICANT CHANGE UP (ref 135–145)
SODIUM SERPL-SCNC: 143 MMOL/L — SIGNIFICANT CHANGE UP (ref 135–145)
SODIUM SERPL-SCNC: 145 MMOL/L — SIGNIFICANT CHANGE UP (ref 135–145)
SODIUM UR-SCNC: 30 MMOL/L — SIGNIFICANT CHANGE UP
SP GR SPEC: 1.01 — SIGNIFICANT CHANGE UP (ref 1.01–1.02)
SP GR SPEC: 1.02 — SIGNIFICANT CHANGE UP (ref 1.01–1.02)
SPECIMEN SOURCE: SIGNIFICANT CHANGE UP
TROPONIN T SERPL-MCNC: <0.01 NG/ML — SIGNIFICANT CHANGE UP (ref 0–0.06)
TYPE + AB SCN PNL BLD: SIGNIFICANT CHANGE UP
UROBILINOGEN FLD QL: 1 MG/DL
UROBILINOGEN FLD QL: NEGATIVE MG/DL — SIGNIFICANT CHANGE UP
WBC # BLD: 10 K/UL — SIGNIFICANT CHANGE UP (ref 4.8–10.8)
WBC # BLD: 10.2 K/UL — SIGNIFICANT CHANGE UP (ref 4.8–10.8)
WBC # BLD: 10.6 K/UL — SIGNIFICANT CHANGE UP (ref 4.8–10.8)
WBC # BLD: 11.2 K/UL — HIGH (ref 4.8–10.8)
WBC # BLD: 11.4 K/UL — HIGH (ref 4.8–10.8)
WBC # BLD: 11.7 K/UL — HIGH (ref 4.8–10.8)
WBC # BLD: 14.6 K/UL — HIGH (ref 4.8–10.8)
WBC # BLD: 16.5 K/UL — HIGH (ref 4.8–10.8)
WBC # BLD: 8.82 K/UL — SIGNIFICANT CHANGE UP (ref 3.8–10.5)
WBC # BLD: 9.58 K/UL — SIGNIFICANT CHANGE UP (ref 3.8–10.5)
WBC # FLD AUTO: 10 K/UL — SIGNIFICANT CHANGE UP (ref 4.8–10.8)
WBC # FLD AUTO: 10.2 K/UL — SIGNIFICANT CHANGE UP (ref 4.8–10.8)
WBC # FLD AUTO: 10.6 K/UL — SIGNIFICANT CHANGE UP (ref 4.8–10.8)
WBC # FLD AUTO: 11.2 K/UL — HIGH (ref 4.8–10.8)
WBC # FLD AUTO: 11.4 K/UL — HIGH (ref 4.8–10.8)
WBC # FLD AUTO: 11.7 K/UL — HIGH (ref 4.8–10.8)
WBC # FLD AUTO: 14.6 K/UL — HIGH (ref 4.8–10.8)
WBC # FLD AUTO: 16.5 K/UL — HIGH (ref 4.8–10.8)
WBC # FLD AUTO: 8.82 K/UL — SIGNIFICANT CHANGE UP (ref 3.8–10.5)
WBC # FLD AUTO: 9.58 K/UL — SIGNIFICANT CHANGE UP (ref 3.8–10.5)
WBC UR QL: >50
WBC UR QL: SIGNIFICANT CHANGE UP

## 2019-01-01 PROCEDURE — 87206 SMEAR FLUORESCENT/ACID STAI: CPT

## 2019-01-01 PROCEDURE — A9537: CPT

## 2019-01-01 PROCEDURE — 99232 SBSQ HOSP IP/OBS MODERATE 35: CPT

## 2019-01-01 PROCEDURE — 76775 US EXAM ABDO BACK WALL LIM: CPT | Mod: 26

## 2019-01-01 PROCEDURE — 81001 URINALYSIS AUTO W/SCOPE: CPT

## 2019-01-01 PROCEDURE — 80053 COMPREHEN METABOLIC PANEL: CPT

## 2019-01-01 PROCEDURE — 99231 SBSQ HOSP IP/OBS SF/LOW 25: CPT

## 2019-01-01 PROCEDURE — 82330 ASSAY OF CALCIUM: CPT

## 2019-01-01 PROCEDURE — 85014 HEMATOCRIT: CPT

## 2019-01-01 PROCEDURE — 99239 HOSP IP/OBS DSCHRG MGMT >30: CPT

## 2019-01-01 PROCEDURE — 82248 BILIRUBIN DIRECT: CPT

## 2019-01-01 PROCEDURE — 78226 HEPATOBILIARY SYSTEM IMAGING: CPT

## 2019-01-01 PROCEDURE — 83690 ASSAY OF LIPASE: CPT

## 2019-01-01 PROCEDURE — 83735 ASSAY OF MAGNESIUM: CPT

## 2019-01-01 PROCEDURE — 99285 EMERGENCY DEPT VISIT HI MDM: CPT | Mod: 25

## 2019-01-01 PROCEDURE — 93005 ELECTROCARDIOGRAM TRACING: CPT

## 2019-01-01 PROCEDURE — C1769: CPT

## 2019-01-01 PROCEDURE — 86901 BLOOD TYPING SEROLOGIC RH(D): CPT

## 2019-01-01 PROCEDURE — 99284 EMERGENCY DEPT VISIT MOD MDM: CPT

## 2019-01-01 PROCEDURE — 82962 GLUCOSE BLOOD TEST: CPT

## 2019-01-01 PROCEDURE — 85027 COMPLETE CBC AUTOMATED: CPT

## 2019-01-01 PROCEDURE — 99223 1ST HOSP IP/OBS HIGH 75: CPT | Mod: AI

## 2019-01-01 PROCEDURE — 84132 ASSAY OF SERUM POTASSIUM: CPT

## 2019-01-01 PROCEDURE — 87075 CULTR BACTERIA EXCEPT BLOOD: CPT

## 2019-01-01 PROCEDURE — 80048 BASIC METABOLIC PNL TOTAL CA: CPT

## 2019-01-01 PROCEDURE — 83036 HEMOGLOBIN GLYCOSYLATED A1C: CPT

## 2019-01-01 PROCEDURE — 93306 TTE W/DOPPLER COMPLETE: CPT | Mod: 26

## 2019-01-01 PROCEDURE — 86900 BLOOD TYPING SEROLOGIC ABO: CPT

## 2019-01-01 PROCEDURE — 49406 IMAGE CATH FLUID PERI/RETRO: CPT | Mod: RT

## 2019-01-01 PROCEDURE — 87086 URINE CULTURE/COLONY COUNT: CPT

## 2019-01-01 PROCEDURE — 96376 TX/PRO/DX INJ SAME DRUG ADON: CPT

## 2019-01-01 PROCEDURE — 71045 X-RAY EXAM CHEST 1 VIEW: CPT

## 2019-01-01 PROCEDURE — 84100 ASSAY OF PHOSPHORUS: CPT

## 2019-01-01 PROCEDURE — 82947 ASSAY GLUCOSE BLOOD QUANT: CPT

## 2019-01-01 PROCEDURE — 87015 SPECIMEN INFECT AGNT CONCNTJ: CPT

## 2019-01-01 PROCEDURE — 87205 SMEAR GRAM STAIN: CPT

## 2019-01-01 PROCEDURE — 83880 ASSAY OF NATRIURETIC PEPTIDE: CPT

## 2019-01-01 PROCEDURE — 82310 ASSAY OF CALCIUM: CPT

## 2019-01-01 PROCEDURE — C1729: CPT

## 2019-01-01 PROCEDURE — 36415 COLL VENOUS BLD VENIPUNCTURE: CPT

## 2019-01-01 PROCEDURE — 97116 GAIT TRAINING THERAPY: CPT

## 2019-01-01 PROCEDURE — 96374 THER/PROPH/DIAG INJ IV PUSH: CPT | Mod: XU

## 2019-01-01 PROCEDURE — 96365 THER/PROPH/DIAG IV INF INIT: CPT

## 2019-01-01 PROCEDURE — 99285 EMERGENCY DEPT VISIT HI MDM: CPT

## 2019-01-01 PROCEDURE — 73030 X-RAY EXAM OF SHOULDER: CPT

## 2019-01-01 PROCEDURE — 84145 PROCALCITONIN (PCT): CPT

## 2019-01-01 PROCEDURE — 97163 PT EVAL HIGH COMPLEX 45 MIN: CPT

## 2019-01-01 PROCEDURE — 99213 OFFICE O/P EST LOW 20 MIN: CPT

## 2019-01-01 PROCEDURE — 85610 PROTHROMBIN TIME: CPT

## 2019-01-01 PROCEDURE — 99221 1ST HOSP IP/OBS SF/LOW 40: CPT

## 2019-01-01 PROCEDURE — 99223 1ST HOSP IP/OBS HIGH 75: CPT

## 2019-01-01 PROCEDURE — 71045 X-RAY EXAM CHEST 1 VIEW: CPT | Mod: 26

## 2019-01-01 PROCEDURE — 87070 CULTURE OTHR SPECIMN AEROBIC: CPT

## 2019-01-01 PROCEDURE — 93306 TTE W/DOPPLER COMPLETE: CPT

## 2019-01-01 PROCEDURE — 76775 US EXAM ABDO BACK WALL LIM: CPT

## 2019-01-01 PROCEDURE — 99215 OFFICE O/P EST HI 40 MIN: CPT

## 2019-01-01 PROCEDURE — 71250 CT THORAX DX C-: CPT | Mod: 26

## 2019-01-01 PROCEDURE — 82803 BLOOD GASES ANY COMBINATION: CPT

## 2019-01-01 PROCEDURE — 87186 SC STD MICRODIL/AGAR DIL: CPT

## 2019-01-01 PROCEDURE — 87640 STAPH A DNA AMP PROBE: CPT

## 2019-01-01 PROCEDURE — 47490 INCISION OF GALLBLADDER: CPT

## 2019-01-01 PROCEDURE — 99024 POSTOP FOLLOW-UP VISIT: CPT

## 2019-01-01 PROCEDURE — 93010 ELECTROCARDIOGRAM REPORT: CPT

## 2019-01-01 PROCEDURE — 85730 THROMBOPLASTIN TIME PARTIAL: CPT

## 2019-01-01 PROCEDURE — 78226 HEPATOBILIARY SYSTEM IMAGING: CPT | Mod: 26

## 2019-01-01 PROCEDURE — 73030 X-RAY EXAM OF SHOULDER: CPT | Mod: 26,RT

## 2019-01-01 PROCEDURE — 74176 CT ABD & PELVIS W/O CONTRAST: CPT

## 2019-01-01 PROCEDURE — 99497 ADVNCD CARE PLAN 30 MIN: CPT | Mod: 25

## 2019-01-01 PROCEDURE — 87116 MYCOBACTERIA CULTURE: CPT

## 2019-01-01 PROCEDURE — 87040 BLOOD CULTURE FOR BACTERIA: CPT

## 2019-01-01 PROCEDURE — 76705 ECHO EXAM OF ABDOMEN: CPT

## 2019-01-01 PROCEDURE — 82570 ASSAY OF URINE CREATININE: CPT

## 2019-01-01 PROCEDURE — 77012 CT SCAN FOR NEEDLE BIOPSY: CPT

## 2019-01-01 PROCEDURE — 76705 ECHO EXAM OF ABDOMEN: CPT | Mod: 26

## 2019-01-01 PROCEDURE — 83970 ASSAY OF PARATHORMONE: CPT

## 2019-01-01 PROCEDURE — 49406 IMAGE CATH FLUID PERI/RETRO: CPT

## 2019-01-01 PROCEDURE — 82435 ASSAY OF BLOOD CHLORIDE: CPT

## 2019-01-01 PROCEDURE — 82247 BILIRUBIN TOTAL: CPT

## 2019-01-01 PROCEDURE — 74176 CT ABD & PELVIS W/O CONTRAST: CPT | Mod: 26

## 2019-01-01 PROCEDURE — 99233 SBSQ HOSP IP/OBS HIGH 50: CPT

## 2019-01-01 PROCEDURE — 99202 OFFICE O/P NEW SF 15 MIN: CPT

## 2019-01-01 PROCEDURE — 82550 ASSAY OF CK (CPK): CPT

## 2019-01-01 PROCEDURE — 86850 RBC ANTIBODY SCREEN: CPT

## 2019-01-01 PROCEDURE — 99497 ADVNCD CARE PLAN 30 MIN: CPT

## 2019-01-01 PROCEDURE — 87641 MR-STAPH DNA AMP PROBE: CPT

## 2019-01-01 PROCEDURE — 76937 US GUIDE VASCULAR ACCESS: CPT | Mod: 26

## 2019-01-01 PROCEDURE — 96374 THER/PROPH/DIAG INJ IV PUSH: CPT

## 2019-01-01 PROCEDURE — 84484 ASSAY OF TROPONIN QUANT: CPT

## 2019-01-01 PROCEDURE — 96361 HYDRATE IV INFUSION ADD-ON: CPT

## 2019-01-01 PROCEDURE — 84295 ASSAY OF SERUM SODIUM: CPT

## 2019-01-01 PROCEDURE — 99284 EMERGENCY DEPT VISIT MOD MDM: CPT | Mod: 25

## 2019-01-01 PROCEDURE — 71250 CT THORAX DX C-: CPT

## 2019-01-01 PROCEDURE — 83605 ASSAY OF LACTIC ACID: CPT

## 2019-01-01 PROCEDURE — 97530 THERAPEUTIC ACTIVITIES: CPT

## 2019-01-01 PROCEDURE — 49405 IMAGE CATH FLUID COLXN VISC: CPT

## 2019-01-01 PROCEDURE — 99222 1ST HOSP IP/OBS MODERATE 55: CPT

## 2019-01-01 PROCEDURE — 84300 ASSAY OF URINE SODIUM: CPT

## 2019-01-01 PROCEDURE — 76942 ECHO GUIDE FOR BIOPSY: CPT | Mod: 26,59

## 2019-01-01 PROCEDURE — 86334 IMMUNOFIX E-PHORESIS SERUM: CPT

## 2019-01-01 RX ORDER — KETOROLAC TROMETHAMINE 30 MG/ML
15 SYRINGE (ML) INJECTION ONCE
Refills: 0 | Status: DISCONTINUED | OUTPATIENT
Start: 2019-01-01 | End: 2019-01-01

## 2019-01-01 RX ORDER — PIPERACILLIN AND TAZOBACTAM 4; .5 G/20ML; G/20ML
3.38 INJECTION, POWDER, LYOPHILIZED, FOR SOLUTION INTRAVENOUS ONCE
Refills: 0 | Status: COMPLETED | OUTPATIENT
Start: 2019-01-01 | End: 2019-01-01

## 2019-01-01 RX ORDER — ACETAMINOPHEN 500 MG
650 TABLET ORAL ONCE
Refills: 0 | Status: COMPLETED | OUTPATIENT
Start: 2019-01-01 | End: 2019-01-01

## 2019-01-01 RX ORDER — ACETAMINOPHEN 500 MG
650 TABLET ORAL EVERY 6 HOURS
Refills: 0 | Status: DISCONTINUED | OUTPATIENT
Start: 2019-01-01 | End: 2019-01-01

## 2019-01-01 RX ORDER — SODIUM CHLORIDE 9 MG/ML
1000 INJECTION INTRAMUSCULAR; INTRAVENOUS; SUBCUTANEOUS
Refills: 0 | Status: DISCONTINUED | OUTPATIENT
Start: 2019-01-01 | End: 2019-01-01

## 2019-01-01 RX ORDER — CELECOXIB 200 MG/1
0 CAPSULE ORAL
Qty: 0 | Refills: 0 | DISCHARGE

## 2019-01-01 RX ORDER — ERTAPENEM SODIUM 1 G/1
1000 INJECTION, POWDER, LYOPHILIZED, FOR SOLUTION INTRAMUSCULAR; INTRAVENOUS EVERY 24 HOURS
Refills: 0 | Status: DISCONTINUED | OUTPATIENT
Start: 2019-01-01 | End: 2019-01-01

## 2019-01-01 RX ORDER — HEPARIN SODIUM 5000 [USP'U]/ML
5000 INJECTION INTRAVENOUS; SUBCUTANEOUS EVERY 8 HOURS
Refills: 0 | Status: DISCONTINUED | OUTPATIENT
Start: 2019-01-01 | End: 2019-01-01

## 2019-01-01 RX ORDER — INSULIN GLARGINE 100 [IU]/ML
10 INJECTION, SOLUTION SUBCUTANEOUS ONCE
Refills: 0 | Status: DISCONTINUED | OUTPATIENT
Start: 2019-01-01 | End: 2019-01-01

## 2019-01-01 RX ORDER — POLYETHYLENE GLYCOL 3350 17 G/17G
17 POWDER, FOR SOLUTION ORAL DAILY
Refills: 0 | Status: DISCONTINUED | OUTPATIENT
Start: 2019-01-01 | End: 2019-01-01

## 2019-01-01 RX ORDER — PANTOPRAZOLE SODIUM 20 MG/1
40 TABLET, DELAYED RELEASE ORAL DAILY
Refills: 0 | Status: DISCONTINUED | OUTPATIENT
Start: 2019-01-01 | End: 2019-01-01

## 2019-01-01 RX ORDER — OXYCODONE HYDROCHLORIDE 5 MG/1
5 TABLET ORAL EVERY 6 HOURS
Refills: 0 | Status: DISCONTINUED | OUTPATIENT
Start: 2019-01-01 | End: 2019-01-01

## 2019-01-01 RX ORDER — FAMOTIDINE 10 MG/ML
1 INJECTION INTRAVENOUS
Qty: 0 | Refills: 0 | DISCHARGE

## 2019-01-01 RX ORDER — ATORVASTATIN CALCIUM 80 MG/1
10 TABLET, FILM COATED ORAL AT BEDTIME
Refills: 0 | Status: DISCONTINUED | OUTPATIENT
Start: 2019-01-01 | End: 2019-01-01

## 2019-01-01 RX ORDER — SODIUM CHLORIDE 9 MG/ML
3 INJECTION INTRAMUSCULAR; INTRAVENOUS; SUBCUTANEOUS ONCE
Refills: 0 | Status: COMPLETED | OUTPATIENT
Start: 2019-01-01 | End: 2019-01-01

## 2019-01-01 RX ORDER — ASPIRIN/CALCIUM CARB/MAGNESIUM 324 MG
1 TABLET ORAL
Qty: 0 | Refills: 0 | DISCHARGE

## 2019-01-01 RX ORDER — ERTAPENEM SODIUM 1 G/1
INJECTION, POWDER, LYOPHILIZED, FOR SOLUTION INTRAMUSCULAR; INTRAVENOUS
Refills: 0 | Status: DISCONTINUED | OUTPATIENT
Start: 2019-01-01

## 2019-01-01 RX ORDER — HYDROMORPHONE HYDROCHLORIDE 2 MG/ML
0.5 INJECTION INTRAMUSCULAR; INTRAVENOUS; SUBCUTANEOUS
Qty: 0 | Refills: 0 | DISCHARGE
Start: 2019-01-01

## 2019-01-01 RX ORDER — ERTAPENEM SODIUM 1 G/1
1 INJECTION, POWDER, LYOPHILIZED, FOR SOLUTION INTRAMUSCULAR; INTRAVENOUS
Qty: 0 | Refills: 0 | DISCHARGE

## 2019-01-01 RX ORDER — ONDANSETRON 8 MG/1
4 TABLET, FILM COATED ORAL EVERY 6 HOURS
Refills: 0 | Status: DISCONTINUED | OUTPATIENT
Start: 2019-01-01 | End: 2019-01-01

## 2019-01-01 RX ORDER — MONTELUKAST 4 MG/1
1 TABLET, CHEWABLE ORAL
Qty: 0 | Refills: 0 | DISCHARGE

## 2019-01-01 RX ORDER — FUROSEMIDE 40 MG
1 TABLET ORAL
Qty: 0 | Refills: 0 | DISCHARGE
Start: 2019-01-01

## 2019-01-01 RX ORDER — MAGNESIUM HYDROXIDE 400 MG/1
30 TABLET, CHEWABLE ORAL
Qty: 0 | Refills: 0 | DISCHARGE

## 2019-01-01 RX ORDER — SODIUM CHLORIDE 9 MG/ML
500 INJECTION INTRAMUSCULAR; INTRAVENOUS; SUBCUTANEOUS ONCE
Refills: 0 | Status: COMPLETED | OUTPATIENT
Start: 2019-01-01 | End: 2019-01-01

## 2019-01-01 RX ORDER — SACCHAROMYCES BOULARDII 250 MG
1 POWDER IN PACKET (EA) ORAL
Qty: 0 | Refills: 0 | DISCHARGE
Start: 2019-01-01

## 2019-01-01 RX ORDER — INFLUENZA VIRUS VACCINE 15; 15; 15; 15 UG/.5ML; UG/.5ML; UG/.5ML; UG/.5ML
0.5 SUSPENSION INTRAMUSCULAR ONCE
Refills: 0 | Status: DISCONTINUED | OUTPATIENT
Start: 2019-01-01 | End: 2019-01-01

## 2019-01-01 RX ORDER — HYDROMORPHONE HYDROCHLORIDE 2 MG/ML
0.5 INJECTION INTRAMUSCULAR; INTRAVENOUS; SUBCUTANEOUS EVERY 6 HOURS
Refills: 0 | Status: DISCONTINUED | OUTPATIENT
Start: 2019-01-01 | End: 2019-01-01

## 2019-01-01 RX ORDER — TAMSULOSIN HYDROCHLORIDE 0.4 MG/1
1 CAPSULE ORAL
Qty: 0 | Refills: 0 | DISCHARGE
Start: 2019-01-01

## 2019-01-01 RX ORDER — HEPARIN SODIUM 5000 [USP'U]/ML
5000 INJECTION INTRAVENOUS; SUBCUTANEOUS EVERY 12 HOURS
Refills: 0 | Status: DISCONTINUED | OUTPATIENT
Start: 2019-01-01 | End: 2019-01-01

## 2019-01-01 RX ORDER — HYDROMORPHONE HYDROCHLORIDE 2 MG/ML
0.5 INJECTION INTRAMUSCULAR; INTRAVENOUS; SUBCUTANEOUS EVERY 4 HOURS
Refills: 0 | Status: DISCONTINUED | OUTPATIENT
Start: 2019-01-01 | End: 2019-01-01

## 2019-01-01 RX ORDER — TAMSULOSIN HYDROCHLORIDE 0.4 MG/1
1 CAPSULE ORAL
Qty: 0 | Refills: 0 | DISCHARGE

## 2019-01-01 RX ORDER — SODIUM CHLORIDE 9 MG/ML
1000 INJECTION, SOLUTION INTRAVENOUS
Refills: 0 | Status: DISCONTINUED | OUTPATIENT
Start: 2019-01-01 | End: 2019-01-01

## 2019-01-01 RX ORDER — ASPIRIN/CALCIUM CARB/MAGNESIUM 324 MG
81 TABLET ORAL DAILY
Refills: 0 | Status: DISCONTINUED | OUTPATIENT
Start: 2019-01-01 | End: 2019-01-01

## 2019-01-01 RX ORDER — INSULIN LISPRO 100/ML
8 VIAL (ML) SUBCUTANEOUS ONCE
Refills: 0 | Status: DISCONTINUED | OUTPATIENT
Start: 2019-01-01 | End: 2019-01-01

## 2019-01-01 RX ORDER — FUROSEMIDE 40 MG
20 TABLET ORAL
Refills: 0 | Status: DISCONTINUED | OUTPATIENT
Start: 2019-01-01 | End: 2019-01-01

## 2019-01-01 RX ORDER — ERTAPENEM SODIUM 1 G/1
500 INJECTION, POWDER, LYOPHILIZED, FOR SOLUTION INTRAMUSCULAR; INTRAVENOUS
Qty: 0 | Refills: 0 | DISCHARGE

## 2019-01-01 RX ORDER — CAPSAICIN 0.025 %
1 CREAM (GRAM) TOPICAL THREE TIMES A DAY
Refills: 0 | Status: DISCONTINUED | OUTPATIENT
Start: 2019-01-01 | End: 2019-01-01

## 2019-01-01 RX ORDER — AMPICILLIN SODIUM AND SULBACTAM SODIUM 250; 125 MG/ML; MG/ML
3 INJECTION, POWDER, FOR SUSPENSION INTRAMUSCULAR; INTRAVENOUS EVERY 6 HOURS
Refills: 0 | Status: DISCONTINUED | OUTPATIENT
Start: 2019-01-01 | End: 2019-01-01

## 2019-01-01 RX ORDER — ERTAPENEM SODIUM 1 G/1
500 INJECTION, POWDER, LYOPHILIZED, FOR SOLUTION INTRAMUSCULAR; INTRAVENOUS EVERY 24 HOURS
Refills: 0 | Status: DISCONTINUED | OUTPATIENT
Start: 2019-01-01 | End: 2019-01-01

## 2019-01-01 RX ORDER — IBUPROFEN 200 MG
400 TABLET ORAL ONCE
Refills: 0 | Status: COMPLETED | OUTPATIENT
Start: 2019-01-01 | End: 2019-01-01

## 2019-01-01 RX ORDER — FENTANYL CITRATE 50 UG/ML
1 INJECTION INTRAVENOUS
Refills: 0 | Status: DISCONTINUED | OUTPATIENT
Start: 2019-01-01 | End: 2019-01-01

## 2019-01-01 RX ORDER — SENNA PLUS 8.6 MG/1
2 TABLET ORAL AT BEDTIME
Refills: 0 | Status: DISCONTINUED | OUTPATIENT
Start: 2019-01-01 | End: 2019-01-01

## 2019-01-01 RX ORDER — FERROUS SULFATE 325(65) MG
1 TABLET ORAL
Qty: 0 | Refills: 0 | DISCHARGE

## 2019-01-01 RX ORDER — ERTAPENEM SODIUM 1 G/1
500 INJECTION, POWDER, LYOPHILIZED, FOR SOLUTION INTRAMUSCULAR; INTRAVENOUS ONCE
Refills: 0 | Status: COMPLETED | OUTPATIENT
Start: 2019-01-01 | End: 2019-01-01

## 2019-01-01 RX ORDER — CELECOXIB 200 MG/1
1 CAPSULE ORAL
Qty: 0 | Refills: 0 | DISCHARGE

## 2019-01-01 RX ORDER — GLYCERIN ADULT
1 SUPPOSITORY, RECTAL RECTAL ONCE
Refills: 0 | Status: COMPLETED | OUTPATIENT
Start: 2019-01-01 | End: 2019-01-01

## 2019-01-01 RX ORDER — OXYCODONE HYDROCHLORIDE 5 MG/1
2.5 TABLET ORAL EVERY 6 HOURS
Refills: 0 | Status: DISCONTINUED | OUTPATIENT
Start: 2019-01-01 | End: 2019-01-01

## 2019-01-01 RX ORDER — CHOLECALCIFEROL (VITAMIN D3) 125 MCG
1 CAPSULE ORAL
Qty: 0 | Refills: 0 | DISCHARGE

## 2019-01-01 RX ORDER — MELOXICAM 15 MG/1
0 TABLET ORAL
Qty: 0 | Refills: 0 | DISCHARGE

## 2019-01-01 RX ORDER — ERTAPENEM SODIUM 1 G/1
INJECTION, POWDER, LYOPHILIZED, FOR SOLUTION INTRAMUSCULAR; INTRAVENOUS
Refills: 0 | Status: DISCONTINUED | OUTPATIENT
Start: 2019-01-01 | End: 2019-01-01

## 2019-01-01 RX ORDER — ACETAMINOPHEN 500 MG
2 TABLET ORAL
Qty: 0 | Refills: 0 | DISCHARGE
Start: 2019-01-01

## 2019-01-01 RX ORDER — MORPHINE SULFATE 50 MG/1
1 CAPSULE, EXTENDED RELEASE ORAL ONCE
Refills: 0 | Status: DISCONTINUED | OUTPATIENT
Start: 2019-01-01 | End: 2019-01-01

## 2019-01-01 RX ORDER — SODIUM CHLORIDE 9 MG/ML
3 INJECTION INTRAMUSCULAR; INTRAVENOUS; SUBCUTANEOUS ONCE
Refills: 0 | Status: DISCONTINUED | OUTPATIENT
Start: 2019-01-01 | End: 2019-01-01

## 2019-01-01 RX ORDER — SACCHAROMYCES BOULARDII 250 MG
250 POWDER IN PACKET (EA) ORAL
Refills: 0 | Status: DISCONTINUED | OUTPATIENT
Start: 2019-01-01 | End: 2019-01-01

## 2019-01-01 RX ORDER — TAMSULOSIN HYDROCHLORIDE 0.4 MG/1
0.4 CAPSULE ORAL AT BEDTIME
Refills: 0 | Status: DISCONTINUED | OUTPATIENT
Start: 2019-01-01 | End: 2019-01-01

## 2019-01-01 RX ORDER — FUROSEMIDE 40 MG
0 TABLET ORAL
Qty: 0 | Refills: 0 | DISCHARGE

## 2019-01-01 RX ORDER — OXYCODONE HYDROCHLORIDE 5 MG/1
10 TABLET ORAL EVERY 4 HOURS
Refills: 0 | Status: DISCONTINUED | OUTPATIENT
Start: 2019-01-01 | End: 2019-01-01

## 2019-01-01 RX ORDER — POLYETHYLENE GLYCOL 3350 17 G/17G
17 POWDER, FOR SOLUTION ORAL
Qty: 0 | Refills: 0 | DISCHARGE
Start: 2019-01-01

## 2019-01-01 RX ORDER — SODIUM CHLORIDE 9 MG/ML
500 INJECTION, SOLUTION INTRAVENOUS ONCE
Refills: 0 | Status: COMPLETED | OUTPATIENT
Start: 2019-01-01 | End: 2019-01-01

## 2019-01-01 RX ORDER — TRAMADOL HYDROCHLORIDE 50 MG/1
25 TABLET ORAL EVERY 4 HOURS
Refills: 0 | Status: DISCONTINUED | OUTPATIENT
Start: 2019-01-01 | End: 2019-01-01

## 2019-01-01 RX ORDER — OXYCODONE HYDROCHLORIDE 5 MG/1
5 TABLET ORAL EVERY 4 HOURS
Refills: 0 | Status: DISCONTINUED | OUTPATIENT
Start: 2019-01-01 | End: 2019-01-01

## 2019-01-01 RX ORDER — SENNA PLUS 8.6 MG/1
2 TABLET ORAL
Qty: 0 | Refills: 0 | DISCHARGE
Start: 2019-01-01

## 2019-01-01 RX ORDER — MORPHINE SULFATE 50 MG/1
2 CAPSULE, EXTENDED RELEASE ORAL ONCE
Refills: 0 | Status: DISCONTINUED | OUTPATIENT
Start: 2019-01-01 | End: 2019-01-01

## 2019-01-01 RX ORDER — ACETAMINOPHEN 500 MG
1000 TABLET ORAL ONCE
Refills: 0 | Status: DISCONTINUED | OUTPATIENT
Start: 2019-01-01 | End: 2019-01-01

## 2019-01-01 RX ORDER — MAGNESIUM SULFATE 500 MG/ML
2 VIAL (ML) INJECTION ONCE
Refills: 0 | Status: COMPLETED | OUTPATIENT
Start: 2019-01-01 | End: 2019-01-01

## 2019-01-01 RX ORDER — ACETAMINOPHEN 500 MG
100 TABLET ORAL
Qty: 0 | Refills: 0 | DISCHARGE
Start: 2019-01-01

## 2019-01-01 RX ORDER — MAGNESIUM OXIDE 400 MG ORAL TABLET 241.3 MG
400 TABLET ORAL
Refills: 0 | Status: DISCONTINUED | OUTPATIENT
Start: 2019-01-01 | End: 2019-01-01

## 2019-01-01 RX ORDER — PIPERACILLIN AND TAZOBACTAM 4; .5 G/20ML; G/20ML
3.38 INJECTION, POWDER, LYOPHILIZED, FOR SOLUTION INTRAVENOUS EVERY 12 HOURS
Refills: 0 | Status: DISCONTINUED | OUTPATIENT
Start: 2019-01-01 | End: 2019-01-01

## 2019-01-01 RX ORDER — SODIUM CHLORIDE 9 MG/ML
1000 INJECTION INTRAMUSCULAR; INTRAVENOUS; SUBCUTANEOUS ONCE
Refills: 0 | Status: COMPLETED | OUTPATIENT
Start: 2019-01-01 | End: 2019-01-01

## 2019-01-01 RX ORDER — CHLORHEXIDINE GLUCONATE 213 G/1000ML
1 SOLUTION TOPICAL
Refills: 0 | Status: DISCONTINUED | OUTPATIENT
Start: 2019-01-01 | End: 2019-01-01

## 2019-01-01 RX ORDER — SODIUM CHLORIDE 9 MG/ML
3 INJECTION INTRAMUSCULAR; INTRAVENOUS; SUBCUTANEOUS EVERY 8 HOURS
Refills: 0 | Status: DISCONTINUED | OUTPATIENT
Start: 2019-01-01 | End: 2019-01-01

## 2019-01-01 RX ORDER — CELECOXIB 200 MG/1
200 CAPSULE ORAL
Refills: 0 | Status: DISCONTINUED | OUTPATIENT
Start: 2019-01-01 | End: 2019-01-01

## 2019-01-01 RX ORDER — LINEZOLID 600 MG/300ML
600 INJECTION, SOLUTION INTRAVENOUS EVERY 12 HOURS
Refills: 0 | Status: DISCONTINUED | OUTPATIENT
Start: 2019-01-01 | End: 2019-01-01

## 2019-01-01 RX ORDER — LIDOCAINE 4 G/100G
1 CREAM TOPICAL DAILY
Refills: 0 | Status: DISCONTINUED | OUTPATIENT
Start: 2019-01-01 | End: 2019-01-01

## 2019-01-01 RX ORDER — MORPHINE SULFATE 50 MG/1
3 CAPSULE, EXTENDED RELEASE ORAL ONCE
Refills: 0 | Status: DISCONTINUED | OUTPATIENT
Start: 2019-01-01 | End: 2019-01-01

## 2019-01-01 RX ORDER — PIPERACILLIN AND TAZOBACTAM 4; .5 G/20ML; G/20ML
INJECTION, POWDER, LYOPHILIZED, FOR SOLUTION INTRAVENOUS
Refills: 0 | Status: DISCONTINUED | OUTPATIENT
Start: 2019-01-01 | End: 2019-01-01

## 2019-01-01 RX ORDER — PANTOPRAZOLE SODIUM 20 MG/1
40 TABLET, DELAYED RELEASE ORAL
Refills: 0 | Status: DISCONTINUED | OUTPATIENT
Start: 2019-01-01 | End: 2019-01-01

## 2019-01-01 RX ORDER — MONTELUKAST 4 MG/1
10 TABLET, CHEWABLE ORAL DAILY
Refills: 0 | Status: DISCONTINUED | OUTPATIENT
Start: 2019-01-01 | End: 2019-01-01

## 2019-01-01 RX ORDER — LACTOBACILLUS ACIDOPHILUS 100MM CELL
0 CAPSULE ORAL
Qty: 0 | Refills: 0 | DISCHARGE

## 2019-01-01 RX ORDER — LINEZOLID 600 MG/300ML
1 INJECTION, SOLUTION INTRAVENOUS
Qty: 0 | Refills: 0 | DISCHARGE
Start: 2019-01-01

## 2019-01-01 RX ORDER — ACETAMINOPHEN 500 MG
650 TABLET ORAL EVERY 8 HOURS
Refills: 0 | Status: COMPLETED | OUTPATIENT
Start: 2019-01-01 | End: 2019-01-01

## 2019-01-01 RX ORDER — TRAMADOL HYDROCHLORIDE 50 MG/1
25 TABLET ORAL ONCE
Refills: 0 | Status: DISCONTINUED | OUTPATIENT
Start: 2019-01-01 | End: 2019-01-01

## 2019-01-01 RX ORDER — NYSTATIN CREAM 100000 [USP'U]/G
1 CREAM TOPICAL
Refills: 0 | Status: DISCONTINUED | OUTPATIENT
Start: 2019-01-01 | End: 2019-01-01

## 2019-01-01 RX ADMIN — SODIUM CHLORIDE 125 MILLILITER(S): 9 INJECTION, SOLUTION INTRAVENOUS at 10:08

## 2019-01-01 RX ADMIN — Medication 400 MILLIGRAM(S): at 21:12

## 2019-01-01 RX ADMIN — Medication 250 MILLIGRAM(S): at 05:18

## 2019-01-01 RX ADMIN — FENTANYL CITRATE 1 PATCH: 50 INJECTION INTRAVENOUS at 19:51

## 2019-01-01 RX ADMIN — HEPARIN SODIUM 5000 UNIT(S): 5000 INJECTION INTRAVENOUS; SUBCUTANEOUS at 17:58

## 2019-01-01 RX ADMIN — Medication 250 MILLIGRAM(S): at 17:56

## 2019-01-01 RX ADMIN — AMPICILLIN SODIUM AND SULBACTAM SODIUM 200 GRAM(S): 250; 125 INJECTION, POWDER, FOR SUSPENSION INTRAMUSCULAR; INTRAVENOUS at 05:07

## 2019-01-01 RX ADMIN — ERTAPENEM SODIUM 100 MILLIGRAM(S): 1 INJECTION, POWDER, LYOPHILIZED, FOR SOLUTION INTRAMUSCULAR; INTRAVENOUS at 13:39

## 2019-01-01 RX ADMIN — NYSTATIN CREAM 1 APPLICATION(S): 100000 CREAM TOPICAL at 06:07

## 2019-01-01 RX ADMIN — PANTOPRAZOLE SODIUM 40 MILLIGRAM(S): 20 TABLET, DELAYED RELEASE ORAL at 11:52

## 2019-01-01 RX ADMIN — LIDOCAINE 1 PATCH: 4 CREAM TOPICAL at 20:19

## 2019-01-01 RX ADMIN — Medication 50 GRAM(S): at 19:53

## 2019-01-01 RX ADMIN — HYDROMORPHONE HYDROCHLORIDE 0.5 MILLIGRAM(S): 2 INJECTION INTRAMUSCULAR; INTRAVENOUS; SUBCUTANEOUS at 12:18

## 2019-01-01 RX ADMIN — SODIUM CHLORIDE 3 MILLILITER(S): 9 INJECTION INTRAMUSCULAR; INTRAVENOUS; SUBCUTANEOUS at 13:41

## 2019-01-01 RX ADMIN — Medication 650 MILLIGRAM(S): at 22:00

## 2019-01-01 RX ADMIN — TAMSULOSIN HYDROCHLORIDE 0.4 MILLIGRAM(S): 0.4 CAPSULE ORAL at 21:20

## 2019-01-01 RX ADMIN — Medication 250 MILLIGRAM(S): at 18:34

## 2019-01-01 RX ADMIN — ERTAPENEM SODIUM 100 MILLIGRAM(S): 1 INJECTION, POWDER, LYOPHILIZED, FOR SOLUTION INTRAMUSCULAR; INTRAVENOUS at 11:34

## 2019-01-01 RX ADMIN — PANTOPRAZOLE SODIUM 40 MILLIGRAM(S): 20 TABLET, DELAYED RELEASE ORAL at 13:07

## 2019-01-01 RX ADMIN — TAMSULOSIN HYDROCHLORIDE 0.4 MILLIGRAM(S): 0.4 CAPSULE ORAL at 22:31

## 2019-01-01 RX ADMIN — MONTELUKAST 10 MILLIGRAM(S): 4 TABLET, CHEWABLE ORAL at 12:54

## 2019-01-01 RX ADMIN — MORPHINE SULFATE 1 MILLIGRAM(S): 50 CAPSULE, EXTENDED RELEASE ORAL at 21:10

## 2019-01-01 RX ADMIN — AMPICILLIN SODIUM AND SULBACTAM SODIUM 200 GRAM(S): 250; 125 INJECTION, POWDER, FOR SUSPENSION INTRAMUSCULAR; INTRAVENOUS at 23:30

## 2019-01-01 RX ADMIN — OXYCODONE HYDROCHLORIDE 5 MILLIGRAM(S): 5 TABLET ORAL at 07:48

## 2019-01-01 RX ADMIN — TAMSULOSIN HYDROCHLORIDE 0.4 MILLIGRAM(S): 0.4 CAPSULE ORAL at 23:01

## 2019-01-01 RX ADMIN — Medication 250 MILLIGRAM(S): at 06:43

## 2019-01-01 RX ADMIN — LIDOCAINE 1 PATCH: 4 CREAM TOPICAL at 11:30

## 2019-01-01 RX ADMIN — SODIUM CHLORIDE 30 MILLILITER(S): 9 INJECTION, SOLUTION INTRAVENOUS at 00:16

## 2019-01-01 RX ADMIN — HEPARIN SODIUM 5000 UNIT(S): 5000 INJECTION INTRAVENOUS; SUBCUTANEOUS at 06:39

## 2019-01-01 RX ADMIN — HEPARIN SODIUM 5000 UNIT(S): 5000 INJECTION INTRAVENOUS; SUBCUTANEOUS at 22:56

## 2019-01-01 RX ADMIN — ATORVASTATIN CALCIUM 10 MILLIGRAM(S): 80 TABLET, FILM COATED ORAL at 22:31

## 2019-01-01 RX ADMIN — SENNA PLUS 2 TABLET(S): 8.6 TABLET ORAL at 21:22

## 2019-01-01 RX ADMIN — OXYCODONE HYDROCHLORIDE 10 MILLIGRAM(S): 5 TABLET ORAL at 15:32

## 2019-01-01 RX ADMIN — Medication 1 APPLICATION(S): at 06:08

## 2019-01-01 RX ADMIN — Medication 250 MILLIGRAM(S): at 18:05

## 2019-01-01 RX ADMIN — TAMSULOSIN HYDROCHLORIDE 0.4 MILLIGRAM(S): 0.4 CAPSULE ORAL at 22:16

## 2019-01-01 RX ADMIN — PANTOPRAZOLE SODIUM 40 MILLIGRAM(S): 20 TABLET, DELAYED RELEASE ORAL at 12:54

## 2019-01-01 RX ADMIN — SENNA PLUS 2 TABLET(S): 8.6 TABLET ORAL at 22:57

## 2019-01-01 RX ADMIN — Medication 250 MILLIGRAM(S): at 18:07

## 2019-01-01 RX ADMIN — MONTELUKAST 10 MILLIGRAM(S): 4 TABLET, CHEWABLE ORAL at 12:09

## 2019-01-01 RX ADMIN — AMPICILLIN SODIUM AND SULBACTAM SODIUM 200 GRAM(S): 250; 125 INJECTION, POWDER, FOR SUSPENSION INTRAMUSCULAR; INTRAVENOUS at 19:35

## 2019-01-01 RX ADMIN — FENTANYL CITRATE 1 PATCH: 50 INJECTION INTRAVENOUS at 19:00

## 2019-01-01 RX ADMIN — Medication 250 MILLIGRAM(S): at 05:15

## 2019-01-01 RX ADMIN — Medication 1 TABLET(S): at 13:07

## 2019-01-01 RX ADMIN — Medication 250 MILLIGRAM(S): at 17:09

## 2019-01-01 RX ADMIN — Medication 250 MILLIGRAM(S): at 05:29

## 2019-01-01 RX ADMIN — Medication 250 MILLIGRAM(S): at 17:58

## 2019-01-01 RX ADMIN — NYSTATIN CREAM 1 APPLICATION(S): 100000 CREAM TOPICAL at 05:55

## 2019-01-01 RX ADMIN — TRAMADOL HYDROCHLORIDE 25 MILLIGRAM(S): 50 TABLET ORAL at 19:05

## 2019-01-01 RX ADMIN — Medication 650 MILLIGRAM(S): at 14:20

## 2019-01-01 RX ADMIN — HEPARIN SODIUM 5000 UNIT(S): 5000 INJECTION INTRAVENOUS; SUBCUTANEOUS at 05:54

## 2019-01-01 RX ADMIN — TRAMADOL HYDROCHLORIDE 25 MILLIGRAM(S): 50 TABLET ORAL at 14:20

## 2019-01-01 RX ADMIN — ATORVASTATIN CALCIUM 10 MILLIGRAM(S): 80 TABLET, FILM COATED ORAL at 22:57

## 2019-01-01 RX ADMIN — Medication 1 APPLICATION(S): at 14:07

## 2019-01-01 RX ADMIN — Medication 250 MILLIGRAM(S): at 17:16

## 2019-01-01 RX ADMIN — HEPARIN SODIUM 5000 UNIT(S): 5000 INJECTION INTRAVENOUS; SUBCUTANEOUS at 18:17

## 2019-01-01 RX ADMIN — LIDOCAINE 1 PATCH: 4 CREAM TOPICAL at 12:21

## 2019-01-01 RX ADMIN — AMPICILLIN SODIUM AND SULBACTAM SODIUM 200 GRAM(S): 250; 125 INJECTION, POWDER, FOR SUSPENSION INTRAMUSCULAR; INTRAVENOUS at 05:54

## 2019-01-01 RX ADMIN — PANTOPRAZOLE SODIUM 40 MILLIGRAM(S): 20 TABLET, DELAYED RELEASE ORAL at 11:33

## 2019-01-01 RX ADMIN — AMPICILLIN SODIUM AND SULBACTAM SODIUM 200 GRAM(S): 250; 125 INJECTION, POWDER, FOR SUSPENSION INTRAMUSCULAR; INTRAVENOUS at 06:38

## 2019-01-01 RX ADMIN — FENTANYL CITRATE 1 PATCH: 50 INJECTION INTRAVENOUS at 22:06

## 2019-01-01 RX ADMIN — PANTOPRAZOLE SODIUM 40 MILLIGRAM(S): 20 TABLET, DELAYED RELEASE ORAL at 11:37

## 2019-01-01 RX ADMIN — NYSTATIN CREAM 1 APPLICATION(S): 100000 CREAM TOPICAL at 17:47

## 2019-01-01 RX ADMIN — ERTAPENEM SODIUM 100 MILLIGRAM(S): 1 INJECTION, POWDER, LYOPHILIZED, FOR SOLUTION INTRAMUSCULAR; INTRAVENOUS at 10:09

## 2019-01-01 RX ADMIN — Medication 650 MILLIGRAM(S): at 21:25

## 2019-01-01 RX ADMIN — FENTANYL CITRATE 1 PATCH: 50 INJECTION INTRAVENOUS at 07:56

## 2019-01-01 RX ADMIN — HEPARIN SODIUM 5000 UNIT(S): 5000 INJECTION INTRAVENOUS; SUBCUTANEOUS at 17:56

## 2019-01-01 RX ADMIN — Medication 250 MILLIGRAM(S): at 05:25

## 2019-01-01 RX ADMIN — ERTAPENEM SODIUM 100 MILLIGRAM(S): 1 INJECTION, POWDER, LYOPHILIZED, FOR SOLUTION INTRAMUSCULAR; INTRAVENOUS at 17:17

## 2019-01-01 RX ADMIN — TAMSULOSIN HYDROCHLORIDE 0.4 MILLIGRAM(S): 0.4 CAPSULE ORAL at 21:51

## 2019-01-01 RX ADMIN — HEPARIN SODIUM 5000 UNIT(S): 5000 INJECTION INTRAVENOUS; SUBCUTANEOUS at 05:18

## 2019-01-01 RX ADMIN — FENTANYL CITRATE 1 PATCH: 50 INJECTION INTRAVENOUS at 07:23

## 2019-01-01 RX ADMIN — ATORVASTATIN CALCIUM 10 MILLIGRAM(S): 80 TABLET, FILM COATED ORAL at 21:25

## 2019-01-01 RX ADMIN — SODIUM CHLORIDE 1000 MILLILITER(S): 9 INJECTION INTRAMUSCULAR; INTRAVENOUS; SUBCUTANEOUS at 20:49

## 2019-01-01 RX ADMIN — LIDOCAINE 1 PATCH: 4 CREAM TOPICAL at 11:01

## 2019-01-01 RX ADMIN — LIDOCAINE 1 PATCH: 4 CREAM TOPICAL at 12:10

## 2019-01-01 RX ADMIN — FENTANYL CITRATE 1 PATCH: 50 INJECTION INTRAVENOUS at 17:59

## 2019-01-01 RX ADMIN — LIDOCAINE 1 PATCH: 4 CREAM TOPICAL at 02:00

## 2019-01-01 RX ADMIN — NYSTATIN CREAM 1 APPLICATION(S): 100000 CREAM TOPICAL at 18:40

## 2019-01-01 RX ADMIN — NYSTATIN CREAM 1 APPLICATION(S): 100000 CREAM TOPICAL at 16:52

## 2019-01-01 RX ADMIN — Medication 250 MILLIGRAM(S): at 06:39

## 2019-01-01 RX ADMIN — LIDOCAINE 1 PATCH: 4 CREAM TOPICAL at 19:40

## 2019-01-01 RX ADMIN — ERTAPENEM SODIUM 100 MILLIGRAM(S): 1 INJECTION, POWDER, LYOPHILIZED, FOR SOLUTION INTRAMUSCULAR; INTRAVENOUS at 16:43

## 2019-01-01 RX ADMIN — MAGNESIUM OXIDE 400 MG ORAL TABLET 400 MILLIGRAM(S): 241.3 TABLET ORAL at 11:35

## 2019-01-01 RX ADMIN — LIDOCAINE 1 PATCH: 4 CREAM TOPICAL at 14:33

## 2019-01-01 RX ADMIN — MONTELUKAST 10 MILLIGRAM(S): 4 TABLET, CHEWABLE ORAL at 11:30

## 2019-01-01 RX ADMIN — NYSTATIN CREAM 1 APPLICATION(S): 100000 CREAM TOPICAL at 18:06

## 2019-01-01 RX ADMIN — AMPICILLIN SODIUM AND SULBACTAM SODIUM 200 GRAM(S): 250; 125 INJECTION, POWDER, FOR SUSPENSION INTRAMUSCULAR; INTRAVENOUS at 23:03

## 2019-01-01 RX ADMIN — Medication 250 MILLIGRAM(S): at 05:41

## 2019-01-01 RX ADMIN — Medication 650 MILLIGRAM(S): at 22:06

## 2019-01-01 RX ADMIN — PANTOPRAZOLE SODIUM 40 MILLIGRAM(S): 20 TABLET, DELAYED RELEASE ORAL at 05:15

## 2019-01-01 RX ADMIN — ATORVASTATIN CALCIUM 10 MILLIGRAM(S): 80 TABLET, FILM COATED ORAL at 21:24

## 2019-01-01 RX ADMIN — AMPICILLIN SODIUM AND SULBACTAM SODIUM 200 GRAM(S): 250; 125 INJECTION, POWDER, FOR SUSPENSION INTRAMUSCULAR; INTRAVENOUS at 18:31

## 2019-01-01 RX ADMIN — Medication 81 MILLIGRAM(S): at 12:07

## 2019-01-01 RX ADMIN — Medication 250 MILLIGRAM(S): at 05:07

## 2019-01-01 RX ADMIN — TRAMADOL HYDROCHLORIDE 25 MILLIGRAM(S): 50 TABLET ORAL at 18:05

## 2019-01-01 RX ADMIN — FENTANYL CITRATE 1 PATCH: 50 INJECTION INTRAVENOUS at 07:38

## 2019-01-01 RX ADMIN — SODIUM CHLORIDE 125 MILLILITER(S): 9 INJECTION INTRAMUSCULAR; INTRAVENOUS; SUBCUTANEOUS at 13:13

## 2019-01-01 RX ADMIN — AMPICILLIN SODIUM AND SULBACTAM SODIUM 200 GRAM(S): 250; 125 INJECTION, POWDER, FOR SUSPENSION INTRAMUSCULAR; INTRAVENOUS at 11:32

## 2019-01-01 RX ADMIN — HEPARIN SODIUM 5000 UNIT(S): 5000 INJECTION INTRAVENOUS; SUBCUTANEOUS at 17:46

## 2019-01-01 RX ADMIN — MORPHINE SULFATE 1 MILLIGRAM(S): 50 CAPSULE, EXTENDED RELEASE ORAL at 05:10

## 2019-01-01 RX ADMIN — ERTAPENEM SODIUM 100 MILLIGRAM(S): 1 INJECTION, POWDER, LYOPHILIZED, FOR SOLUTION INTRAMUSCULAR; INTRAVENOUS at 18:34

## 2019-01-01 RX ADMIN — Medication 250 MILLIGRAM(S): at 19:37

## 2019-01-01 RX ADMIN — OXYCODONE HYDROCHLORIDE 10 MILLIGRAM(S): 5 TABLET ORAL at 17:35

## 2019-01-01 RX ADMIN — LIDOCAINE 1 PATCH: 4 CREAM TOPICAL at 18:15

## 2019-01-01 RX ADMIN — CHLORHEXIDINE GLUCONATE 1 APPLICATION(S): 213 SOLUTION TOPICAL at 06:22

## 2019-01-01 RX ADMIN — LIDOCAINE 1 PATCH: 4 CREAM TOPICAL at 20:37

## 2019-01-01 RX ADMIN — LIDOCAINE 1 PATCH: 4 CREAM TOPICAL at 23:27

## 2019-01-01 RX ADMIN — TAMSULOSIN HYDROCHLORIDE 0.4 MILLIGRAM(S): 0.4 CAPSULE ORAL at 21:25

## 2019-01-01 RX ADMIN — ATORVASTATIN CALCIUM 10 MILLIGRAM(S): 80 TABLET, FILM COATED ORAL at 21:51

## 2019-01-01 RX ADMIN — NYSTATIN CREAM 1 APPLICATION(S): 100000 CREAM TOPICAL at 19:36

## 2019-01-01 RX ADMIN — Medication 250 MILLIGRAM(S): at 17:40

## 2019-01-01 RX ADMIN — LIDOCAINE 1 PATCH: 4 CREAM TOPICAL at 19:19

## 2019-01-01 RX ADMIN — MORPHINE SULFATE 1 MILLIGRAM(S): 50 CAPSULE, EXTENDED RELEASE ORAL at 21:16

## 2019-01-01 RX ADMIN — MONTELUKAST 10 MILLIGRAM(S): 4 TABLET, CHEWABLE ORAL at 13:07

## 2019-01-01 RX ADMIN — SENNA PLUS 2 TABLET(S): 8.6 TABLET ORAL at 21:24

## 2019-01-01 RX ADMIN — FENTANYL CITRATE 1 PATCH: 50 INJECTION INTRAVENOUS at 17:46

## 2019-01-01 RX ADMIN — ATORVASTATIN CALCIUM 10 MILLIGRAM(S): 80 TABLET, FILM COATED ORAL at 21:22

## 2019-01-01 RX ADMIN — AMPICILLIN SODIUM AND SULBACTAM SODIUM 200 GRAM(S): 250; 125 INJECTION, POWDER, FOR SUSPENSION INTRAMUSCULAR; INTRAVENOUS at 18:17

## 2019-01-01 RX ADMIN — FENTANYL CITRATE 1 PATCH: 50 INJECTION INTRAVENOUS at 21:22

## 2019-01-01 RX ADMIN — MAGNESIUM OXIDE 400 MG ORAL TABLET 400 MILLIGRAM(S): 241.3 TABLET ORAL at 08:51

## 2019-01-01 RX ADMIN — SODIUM CHLORIDE 3 MILLILITER(S): 9 INJECTION INTRAMUSCULAR; INTRAVENOUS; SUBCUTANEOUS at 06:19

## 2019-01-01 RX ADMIN — MAGNESIUM OXIDE 400 MG ORAL TABLET 400 MILLIGRAM(S): 241.3 TABLET ORAL at 13:08

## 2019-01-01 RX ADMIN — ATORVASTATIN CALCIUM 10 MILLIGRAM(S): 80 TABLET, FILM COATED ORAL at 21:28

## 2019-01-01 RX ADMIN — LIDOCAINE 1 PATCH: 4 CREAM TOPICAL at 00:00

## 2019-01-01 RX ADMIN — SODIUM CHLORIDE 75 MILLILITER(S): 9 INJECTION, SOLUTION INTRAVENOUS at 22:53

## 2019-01-01 RX ADMIN — Medication 250 MILLIGRAM(S): at 06:03

## 2019-01-01 RX ADMIN — FENTANYL CITRATE 1 PATCH: 50 INJECTION INTRAVENOUS at 19:22

## 2019-01-01 RX ADMIN — LIDOCAINE 1 PATCH: 4 CREAM TOPICAL at 12:50

## 2019-01-01 RX ADMIN — Medication 650 MILLIGRAM(S): at 15:30

## 2019-01-01 RX ADMIN — NYSTATIN CREAM 1 APPLICATION(S): 100000 CREAM TOPICAL at 05:07

## 2019-01-01 RX ADMIN — Medication 15 MILLIGRAM(S): at 12:07

## 2019-01-01 RX ADMIN — AMPICILLIN SODIUM AND SULBACTAM SODIUM 200 GRAM(S): 250; 125 INJECTION, POWDER, FOR SUSPENSION INTRAMUSCULAR; INTRAVENOUS at 23:02

## 2019-01-01 RX ADMIN — LIDOCAINE 1 PATCH: 4 CREAM TOPICAL at 21:22

## 2019-01-01 RX ADMIN — SODIUM CHLORIDE 500 MILLILITER(S): 9 INJECTION INTRAMUSCULAR; INTRAVENOUS; SUBCUTANEOUS at 21:12

## 2019-01-01 RX ADMIN — AMPICILLIN SODIUM AND SULBACTAM SODIUM 200 GRAM(S): 250; 125 INJECTION, POWDER, FOR SUSPENSION INTRAMUSCULAR; INTRAVENOUS at 05:40

## 2019-01-01 RX ADMIN — LIDOCAINE 1 PATCH: 4 CREAM TOPICAL at 23:30

## 2019-01-01 RX ADMIN — TAMSULOSIN HYDROCHLORIDE 0.4 MILLIGRAM(S): 0.4 CAPSULE ORAL at 21:28

## 2019-01-01 RX ADMIN — TAMSULOSIN HYDROCHLORIDE 0.4 MILLIGRAM(S): 0.4 CAPSULE ORAL at 21:06

## 2019-01-01 RX ADMIN — MORPHINE SULFATE 2 MILLIGRAM(S): 50 CAPSULE, EXTENDED RELEASE ORAL at 15:02

## 2019-01-01 RX ADMIN — FENTANYL CITRATE 1 PATCH: 50 INJECTION INTRAVENOUS at 17:00

## 2019-01-01 RX ADMIN — AMPICILLIN SODIUM AND SULBACTAM SODIUM 200 GRAM(S): 250; 125 INJECTION, POWDER, FOR SUSPENSION INTRAMUSCULAR; INTRAVENOUS at 12:51

## 2019-01-01 RX ADMIN — MONTELUKAST 10 MILLIGRAM(S): 4 TABLET, CHEWABLE ORAL at 11:35

## 2019-01-01 RX ADMIN — Medication 81 MILLIGRAM(S): at 12:28

## 2019-01-01 RX ADMIN — FENTANYL CITRATE 1 PATCH: 50 INJECTION INTRAVENOUS at 07:47

## 2019-01-01 RX ADMIN — MONTELUKAST 10 MILLIGRAM(S): 4 TABLET, CHEWABLE ORAL at 18:16

## 2019-01-01 RX ADMIN — PANTOPRAZOLE SODIUM 40 MILLIGRAM(S): 20 TABLET, DELAYED RELEASE ORAL at 05:25

## 2019-01-01 RX ADMIN — MORPHINE SULFATE 2 MILLIGRAM(S): 50 CAPSULE, EXTENDED RELEASE ORAL at 15:30

## 2019-01-01 RX ADMIN — MAGNESIUM OXIDE 400 MG ORAL TABLET 400 MILLIGRAM(S): 241.3 TABLET ORAL at 18:07

## 2019-01-01 RX ADMIN — Medication 650 MILLIGRAM(S): at 15:17

## 2019-01-01 RX ADMIN — MAGNESIUM OXIDE 400 MG ORAL TABLET 400 MILLIGRAM(S): 241.3 TABLET ORAL at 19:39

## 2019-01-01 RX ADMIN — LIDOCAINE 1 PATCH: 4 CREAM TOPICAL at 00:11

## 2019-01-01 RX ADMIN — Medication 1 TABLET(S): at 18:16

## 2019-01-01 RX ADMIN — Medication 50 GRAM(S): at 14:14

## 2019-01-01 RX ADMIN — AMPICILLIN SODIUM AND SULBACTAM SODIUM 200 GRAM(S): 250; 125 INJECTION, POWDER, FOR SUSPENSION INTRAMUSCULAR; INTRAVENOUS at 16:52

## 2019-01-01 RX ADMIN — OXYCODONE HYDROCHLORIDE 10 MILLIGRAM(S): 5 TABLET ORAL at 18:15

## 2019-01-01 RX ADMIN — PIPERACILLIN AND TAZOBACTAM 3.38 GRAM(S): 4; .5 INJECTION, POWDER, LYOPHILIZED, FOR SOLUTION INTRAVENOUS at 15:51

## 2019-01-01 RX ADMIN — SENNA PLUS 2 TABLET(S): 8.6 TABLET ORAL at 21:28

## 2019-01-01 RX ADMIN — ATORVASTATIN CALCIUM 10 MILLIGRAM(S): 80 TABLET, FILM COATED ORAL at 21:20

## 2019-01-01 RX ADMIN — SENNA PLUS 2 TABLET(S): 8.6 TABLET ORAL at 21:20

## 2019-01-01 RX ADMIN — PIPERACILLIN AND TAZOBACTAM 25 GRAM(S): 4; .5 INJECTION, POWDER, LYOPHILIZED, FOR SOLUTION INTRAVENOUS at 03:35

## 2019-01-01 RX ADMIN — Medication 1 APPLICATION(S): at 22:31

## 2019-01-01 RX ADMIN — Medication 15 MILLIGRAM(S): at 12:40

## 2019-01-01 RX ADMIN — OXYCODONE HYDROCHLORIDE 5 MILLIGRAM(S): 5 TABLET ORAL at 06:39

## 2019-01-01 RX ADMIN — LIDOCAINE 1 PATCH: 4 CREAM TOPICAL at 02:43

## 2019-01-01 RX ADMIN — HEPARIN SODIUM 5000 UNIT(S): 5000 INJECTION INTRAVENOUS; SUBCUTANEOUS at 18:07

## 2019-01-01 RX ADMIN — Medication 250 MILLIGRAM(S): at 17:46

## 2019-01-01 RX ADMIN — ATORVASTATIN CALCIUM 10 MILLIGRAM(S): 80 TABLET, FILM COATED ORAL at 22:26

## 2019-01-01 RX ADMIN — SENNA PLUS 2 TABLET(S): 8.6 TABLET ORAL at 21:12

## 2019-01-01 RX ADMIN — HEPARIN SODIUM 5000 UNIT(S): 5000 INJECTION INTRAVENOUS; SUBCUTANEOUS at 06:07

## 2019-01-01 RX ADMIN — ERTAPENEM SODIUM 100 MILLIGRAM(S): 1 INJECTION, POWDER, LYOPHILIZED, FOR SOLUTION INTRAMUSCULAR; INTRAVENOUS at 15:16

## 2019-01-01 RX ADMIN — SODIUM CHLORIDE 100 MILLILITER(S): 9 INJECTION, SOLUTION INTRAVENOUS at 03:34

## 2019-01-01 RX ADMIN — Medication 250 MILLIGRAM(S): at 17:34

## 2019-01-01 RX ADMIN — Medication 250 MILLIGRAM(S): at 05:37

## 2019-01-01 RX ADMIN — SENNA PLUS 2 TABLET(S): 8.6 TABLET ORAL at 22:26

## 2019-01-01 RX ADMIN — LIDOCAINE 1 PATCH: 4 CREAM TOPICAL at 02:42

## 2019-01-01 RX ADMIN — SENNA PLUS 2 TABLET(S): 8.6 TABLET ORAL at 23:29

## 2019-01-01 RX ADMIN — Medication 650 MILLIGRAM(S): at 21:24

## 2019-01-01 RX ADMIN — HEPARIN SODIUM 5000 UNIT(S): 5000 INJECTION INTRAVENOUS; SUBCUTANEOUS at 05:29

## 2019-01-01 RX ADMIN — NYSTATIN CREAM 1 APPLICATION(S): 100000 CREAM TOPICAL at 06:40

## 2019-01-01 RX ADMIN — MONTELUKAST 10 MILLIGRAM(S): 4 TABLET, CHEWABLE ORAL at 14:33

## 2019-01-01 RX ADMIN — PIPERACILLIN AND TAZOBACTAM 25 GRAM(S): 4; .5 INJECTION, POWDER, LYOPHILIZED, FOR SOLUTION INTRAVENOUS at 14:47

## 2019-01-01 RX ADMIN — Medication 250 MILLIGRAM(S): at 16:52

## 2019-01-01 RX ADMIN — TRAMADOL HYDROCHLORIDE 25 MILLIGRAM(S): 50 TABLET ORAL at 15:18

## 2019-01-01 RX ADMIN — MONTELUKAST 10 MILLIGRAM(S): 4 TABLET, CHEWABLE ORAL at 11:52

## 2019-01-01 RX ADMIN — LIDOCAINE 1 PATCH: 4 CREAM TOPICAL at 11:52

## 2019-01-01 RX ADMIN — Medication 250 MILLIGRAM(S): at 06:07

## 2019-01-01 RX ADMIN — SENNA PLUS 2 TABLET(S): 8.6 TABLET ORAL at 21:25

## 2019-01-01 RX ADMIN — FENTANYL CITRATE 1 PATCH: 50 INJECTION INTRAVENOUS at 08:02

## 2019-01-01 RX ADMIN — HEPARIN SODIUM 5000 UNIT(S): 5000 INJECTION INTRAVENOUS; SUBCUTANEOUS at 17:40

## 2019-01-01 RX ADMIN — SODIUM CHLORIDE 1000 MILLILITER(S): 9 INJECTION INTRAMUSCULAR; INTRAVENOUS; SUBCUTANEOUS at 14:13

## 2019-01-01 RX ADMIN — ATORVASTATIN CALCIUM 10 MILLIGRAM(S): 80 TABLET, FILM COATED ORAL at 21:06

## 2019-01-01 RX ADMIN — SODIUM CHLORIDE 3 MILLILITER(S): 9 INJECTION INTRAMUSCULAR; INTRAVENOUS; SUBCUTANEOUS at 05:22

## 2019-01-01 RX ADMIN — FENTANYL CITRATE 1 PATCH: 50 INJECTION INTRAVENOUS at 18:02

## 2019-01-01 RX ADMIN — HEPARIN SODIUM 5000 UNIT(S): 5000 INJECTION INTRAVENOUS; SUBCUTANEOUS at 05:25

## 2019-01-01 RX ADMIN — SODIUM CHLORIDE 100 MILLILITER(S): 9 INJECTION, SOLUTION INTRAVENOUS at 17:58

## 2019-01-01 RX ADMIN — ATORVASTATIN CALCIUM 10 MILLIGRAM(S): 80 TABLET, FILM COATED ORAL at 22:16

## 2019-01-01 RX ADMIN — Medication 650 MILLIGRAM(S): at 05:15

## 2019-01-01 RX ADMIN — Medication 1 TABLET(S): at 11:32

## 2019-01-01 RX ADMIN — Medication 650 MILLIGRAM(S): at 14:48

## 2019-01-01 RX ADMIN — MONTELUKAST 10 MILLIGRAM(S): 4 TABLET, CHEWABLE ORAL at 11:37

## 2019-01-01 RX ADMIN — Medication 250 MILLIGRAM(S): at 18:00

## 2019-01-01 RX ADMIN — PIPERACILLIN AND TAZOBACTAM 200 GRAM(S): 4; .5 INJECTION, POWDER, LYOPHILIZED, FOR SOLUTION INTRAVENOUS at 15:21

## 2019-01-01 RX ADMIN — TAMSULOSIN HYDROCHLORIDE 0.4 MILLIGRAM(S): 0.4 CAPSULE ORAL at 22:26

## 2019-01-01 RX ADMIN — SODIUM CHLORIDE 1000 MILLILITER(S): 9 INJECTION INTRAMUSCULAR; INTRAVENOUS; SUBCUTANEOUS at 03:35

## 2019-01-01 RX ADMIN — LIDOCAINE 1 PATCH: 4 CREAM TOPICAL at 19:51

## 2019-01-01 RX ADMIN — SODIUM CHLORIDE 125 MILLILITER(S): 9 INJECTION, SOLUTION INTRAVENOUS at 00:35

## 2019-01-01 RX ADMIN — PANTOPRAZOLE SODIUM 40 MILLIGRAM(S): 20 TABLET, DELAYED RELEASE ORAL at 12:51

## 2019-01-01 RX ADMIN — ATORVASTATIN CALCIUM 10 MILLIGRAM(S): 80 TABLET, FILM COATED ORAL at 23:01

## 2019-01-01 RX ADMIN — AMPICILLIN SODIUM AND SULBACTAM SODIUM 200 GRAM(S): 250; 125 INJECTION, POWDER, FOR SUSPENSION INTRAMUSCULAR; INTRAVENOUS at 06:07

## 2019-01-01 RX ADMIN — LIDOCAINE 1 PATCH: 4 CREAM TOPICAL at 22:07

## 2019-01-01 RX ADMIN — FENTANYL CITRATE 1 PATCH: 50 INJECTION INTRAVENOUS at 19:38

## 2019-01-01 RX ADMIN — PIPERACILLIN AND TAZOBACTAM 25 GRAM(S): 4; .5 INJECTION, POWDER, LYOPHILIZED, FOR SOLUTION INTRAVENOUS at 14:34

## 2019-01-01 RX ADMIN — HEPARIN SODIUM 5000 UNIT(S): 5000 INJECTION INTRAVENOUS; SUBCUTANEOUS at 05:15

## 2019-01-01 RX ADMIN — MAGNESIUM OXIDE 400 MG ORAL TABLET 400 MILLIGRAM(S): 241.3 TABLET ORAL at 09:12

## 2019-01-01 RX ADMIN — HEPARIN SODIUM 5000 UNIT(S): 5000 INJECTION INTRAVENOUS; SUBCUTANEOUS at 06:44

## 2019-01-01 RX ADMIN — LIDOCAINE 1 PATCH: 4 CREAM TOPICAL at 11:35

## 2019-01-01 RX ADMIN — FENTANYL CITRATE 1 PATCH: 50 INJECTION INTRAVENOUS at 20:04

## 2019-01-01 RX ADMIN — NYSTATIN CREAM 1 APPLICATION(S): 100000 CREAM TOPICAL at 05:41

## 2019-01-01 RX ADMIN — SODIUM CHLORIDE 1000 MILLILITER(S): 9 INJECTION INTRAMUSCULAR; INTRAVENOUS; SUBCUTANEOUS at 01:03

## 2019-01-01 RX ADMIN — LIDOCAINE 1 PATCH: 4 CREAM TOPICAL at 20:04

## 2019-01-01 RX ADMIN — HEPARIN SODIUM 5000 UNIT(S): 5000 INJECTION INTRAVENOUS; SUBCUTANEOUS at 05:41

## 2019-01-01 RX ADMIN — OXYCODONE HYDROCHLORIDE 5 MILLIGRAM(S): 5 TABLET ORAL at 22:30

## 2019-01-01 RX ADMIN — MONTELUKAST 10 MILLIGRAM(S): 4 TABLET, CHEWABLE ORAL at 12:28

## 2019-01-01 RX ADMIN — SODIUM CHLORIDE 3 MILLILITER(S): 9 INJECTION INTRAMUSCULAR; INTRAVENOUS; SUBCUTANEOUS at 12:18

## 2019-01-01 RX ADMIN — LIDOCAINE 1 PATCH: 4 CREAM TOPICAL at 14:47

## 2019-01-01 RX ADMIN — PANTOPRAZOLE SODIUM 40 MILLIGRAM(S): 20 TABLET, DELAYED RELEASE ORAL at 11:36

## 2019-01-01 RX ADMIN — SODIUM CHLORIDE 3 MILLILITER(S): 9 INJECTION INTRAMUSCULAR; INTRAVENOUS; SUBCUTANEOUS at 20:49

## 2019-01-01 RX ADMIN — LIDOCAINE 1 PATCH: 4 CREAM TOPICAL at 23:54

## 2019-01-01 RX ADMIN — NYSTATIN CREAM 1 APPLICATION(S): 100000 CREAM TOPICAL at 05:18

## 2019-01-01 RX ADMIN — Medication 1 SUPPOSITORY(S): at 04:45

## 2019-01-01 RX ADMIN — ATORVASTATIN CALCIUM 10 MILLIGRAM(S): 80 TABLET, FILM COATED ORAL at 21:12

## 2019-01-01 RX ADMIN — HYDROMORPHONE HYDROCHLORIDE 0.5 MILLIGRAM(S): 2 INJECTION INTRAMUSCULAR; INTRAVENOUS; SUBCUTANEOUS at 17:10

## 2019-01-01 RX ADMIN — NYSTATIN CREAM 1 APPLICATION(S): 100000 CREAM TOPICAL at 19:23

## 2019-01-01 RX ADMIN — MONTELUKAST 10 MILLIGRAM(S): 4 TABLET, CHEWABLE ORAL at 11:32

## 2019-01-01 RX ADMIN — ERTAPENEM SODIUM 100 MILLIGRAM(S): 1 INJECTION, POWDER, LYOPHILIZED, FOR SOLUTION INTRAMUSCULAR; INTRAVENOUS at 12:47

## 2019-01-01 RX ADMIN — LIDOCAINE 1 PATCH: 4 CREAM TOPICAL at 00:46

## 2019-01-01 RX ADMIN — HEPARIN SODIUM 5000 UNIT(S): 5000 INJECTION INTRAVENOUS; SUBCUTANEOUS at 06:03

## 2019-01-01 RX ADMIN — Medication 1 TABLET(S): at 17:46

## 2019-01-01 RX ADMIN — Medication 250 MILLIGRAM(S): at 05:54

## 2019-01-01 RX ADMIN — OXYCODONE HYDROCHLORIDE 10 MILLIGRAM(S): 5 TABLET ORAL at 19:19

## 2019-01-01 RX ADMIN — PANTOPRAZOLE SODIUM 40 MILLIGRAM(S): 20 TABLET, DELAYED RELEASE ORAL at 12:14

## 2019-01-01 RX ADMIN — HEPARIN SODIUM 5000 UNIT(S): 5000 INJECTION INTRAVENOUS; SUBCUTANEOUS at 17:34

## 2019-01-01 RX ADMIN — TAMSULOSIN HYDROCHLORIDE 0.4 MILLIGRAM(S): 0.4 CAPSULE ORAL at 22:57

## 2019-01-01 RX ADMIN — Medication 650 MILLIGRAM(S): at 12:28

## 2019-01-01 RX ADMIN — SODIUM CHLORIDE 1000 MILLILITER(S): 9 INJECTION, SOLUTION INTRAVENOUS at 12:08

## 2019-01-01 RX ADMIN — AMPICILLIN SODIUM AND SULBACTAM SODIUM 200 GRAM(S): 250; 125 INJECTION, POWDER, FOR SUSPENSION INTRAMUSCULAR; INTRAVENOUS at 13:14

## 2019-01-01 RX ADMIN — PIPERACILLIN AND TAZOBACTAM 25 GRAM(S): 4; .5 INJECTION, POWDER, LYOPHILIZED, FOR SOLUTION INTRAVENOUS at 02:46

## 2019-01-01 RX ADMIN — AMPICILLIN SODIUM AND SULBACTAM SODIUM 200 GRAM(S): 250; 125 INJECTION, POWDER, FOR SUSPENSION INTRAMUSCULAR; INTRAVENOUS at 23:12

## 2019-01-01 RX ADMIN — SODIUM CHLORIDE 100 MILLILITER(S): 9 INJECTION, SOLUTION INTRAVENOUS at 10:43

## 2019-01-01 RX ADMIN — FENTANYL CITRATE 1 PATCH: 50 INJECTION INTRAVENOUS at 17:05

## 2019-01-01 RX ADMIN — HEPARIN SODIUM 5000 UNIT(S): 5000 INJECTION INTRAVENOUS; SUBCUTANEOUS at 17:09

## 2019-01-01 RX ADMIN — MONTELUKAST 10 MILLIGRAM(S): 4 TABLET, CHEWABLE ORAL at 12:07

## 2019-01-01 RX ADMIN — SODIUM CHLORIDE 100 MILLILITER(S): 9 INJECTION INTRAMUSCULAR; INTRAVENOUS; SUBCUTANEOUS at 12:05

## 2019-01-01 RX ADMIN — HEPARIN SODIUM 5000 UNIT(S): 5000 INJECTION INTRAVENOUS; SUBCUTANEOUS at 17:16

## 2019-01-01 RX ADMIN — SODIUM CHLORIDE 75 MILLILITER(S): 9 INJECTION INTRAMUSCULAR; INTRAVENOUS; SUBCUTANEOUS at 22:32

## 2019-01-01 RX ADMIN — FENTANYL CITRATE 1 PATCH: 50 INJECTION INTRAVENOUS at 19:40

## 2019-01-01 RX ADMIN — Medication 250 MILLIGRAM(S): at 18:16

## 2019-01-01 RX ADMIN — FENTANYL CITRATE 1 PATCH: 50 INJECTION INTRAVENOUS at 09:06

## 2019-01-01 RX ADMIN — SENNA PLUS 2 TABLET(S): 8.6 TABLET ORAL at 21:51

## 2019-01-01 RX ADMIN — TRAMADOL HYDROCHLORIDE 25 MILLIGRAM(S): 50 TABLET ORAL at 18:16

## 2019-01-01 RX ADMIN — HYDROMORPHONE HYDROCHLORIDE 0.5 MILLIGRAM(S): 2 INJECTION INTRAMUSCULAR; INTRAVENOUS; SUBCUTANEOUS at 13:51

## 2019-01-01 RX ADMIN — PIPERACILLIN AND TAZOBACTAM 25 GRAM(S): 4; .5 INJECTION, POWDER, LYOPHILIZED, FOR SOLUTION INTRAVENOUS at 05:04

## 2019-01-01 RX ADMIN — TAMSULOSIN HYDROCHLORIDE 0.4 MILLIGRAM(S): 0.4 CAPSULE ORAL at 21:22

## 2019-01-01 RX ADMIN — Medication 81 MILLIGRAM(S): at 23:28

## 2019-01-01 RX ADMIN — FENTANYL CITRATE 1 PATCH: 50 INJECTION INTRAVENOUS at 20:37

## 2019-01-01 RX ADMIN — Medication 1 TABLET(S): at 11:35

## 2019-01-01 RX ADMIN — SODIUM CHLORIDE 3 MILLILITER(S): 9 INJECTION INTRAMUSCULAR; INTRAVENOUS; SUBCUTANEOUS at 22:58

## 2019-01-01 RX ADMIN — PANTOPRAZOLE SODIUM 40 MILLIGRAM(S): 20 TABLET, DELAYED RELEASE ORAL at 06:03

## 2019-01-01 RX ADMIN — ATORVASTATIN CALCIUM 10 MILLIGRAM(S): 80 TABLET, FILM COATED ORAL at 23:28

## 2019-01-01 RX ADMIN — SODIUM CHLORIDE 500 MILLILITER(S): 9 INJECTION INTRAMUSCULAR; INTRAVENOUS; SUBCUTANEOUS at 20:12

## 2019-01-01 RX ADMIN — AMPICILLIN SODIUM AND SULBACTAM SODIUM 200 GRAM(S): 250; 125 INJECTION, POWDER, FOR SUSPENSION INTRAMUSCULAR; INTRAVENOUS at 18:07

## 2019-01-01 RX ADMIN — PANTOPRAZOLE SODIUM 40 MILLIGRAM(S): 20 TABLET, DELAYED RELEASE ORAL at 05:37

## 2019-05-16 NOTE — H&P ADULT - HISTORY OF PRESENT ILLNESS
89 years old male with PMH of Colon Cancer s/p Resection, Hiatal Hernia, HTN and HLD brought by family with right upper quadrant and right shoulder pain. As per patient, pain started 5 days ago on back side of right chest and then radiated to right shoulder and right upper quadrant. She pulled her right shoulder while getting into her son's truck 5 days ago and she was attributing the pain to it.   It is associated with nausea and decrease appetite. For the last 3 days pain is mostly in right upper quadrant and it is getting worse so she was brought to ER.   Denies fever, urinary symptoms, diarrhea or vomiting. Denies shoulder injury in past.

## 2019-05-16 NOTE — ED ADULT TRIAGE NOTE - CHIEF COMPLAINT QUOTE
Patient states she was getting into her son-in-law's truck 5 days ago and had shoulder pain. Since then pain has remained, began spreading to right back and right hip. Daughter with patient, states she has not eaten much since pain began. Also reports that patient has not taken medications. Daughter states patient fell at home earlier in the week.

## 2019-05-16 NOTE — H&P ADULT - NSHPPHYSICALEXAM_GEN_ALL_CORE
Vital Signs   T(C): 36.4 (16 May 2019 14:20), Max: 36.7 (16 May 2019 11:09)  T(F): 97.5 (16 May 2019 14:20), Max: 98 (16 May 2019 11:09)  HR: 93 (16 May 2019 14:20) (58 - 93)  BP: 105/66 (16 May 2019 14:20) (100/66 - 105/66)  RR: 18 (16 May 2019 14:20) (18 - 18)  SpO2: 96% (16 May 2019 14:20) (96% - 96%)  General: Elderly female sitting in bed comfortably. No acute distress  HEENT: EOMI. Clear conjunctivae. Moist mucus membrane. Hearing aids in place.   Neck: Supple.   Chest: CTA bilaterally - no wheezing, rales or rhonchi. No chest wall tenderness.  Heart: Normal S1 & S2 with RRR.   Abdomen: Soft. Tender in RUQ. Pierce's sign positive. Non-distended. + BS  Ext: No pedal edema. No calf tenderness. Right Shoulder: Tender. Decreased ROM. Skin clear.  Neuro: Active and alert. No focal deficit. No speech disorder.  Skin: Warm and Dry  Psychiatry: Normal mood and affect

## 2019-05-16 NOTE — ED PROVIDER NOTE - OBJECTIVE STATEMENT
89 yo female ambulates with cane and lives with daughter, currently only treated for HTN and on vitamin supplements; p/w progressive worsening aching pain to right shoulder x 5 days with assoc RUQ abd pain and nausea/reflux; daughter states patient was climbing into a truck where she had to pull herslef up and since then, has been c/o this pain to shoulder, which progressed to right posterior/lateral rib region and now RUQ of abd, states she's been belching a lot and not taking PO well; pain is worse with touch and movement, "I just can't find a comfortable position."

## 2019-05-16 NOTE — H&P ADULT - ASSESSMENT
89 years old male with PMH of Colon Cancer s/p Resection, Hiatal Hernia, HTN and HLD brought by family with right upper quadrant and right shoulder pain. As per patient, pain started 5 days ago on back side of right chest and then radiated to right shoulder and right upper quadrant. She pulled her right shoulder while getting into her son's truck 5 days ago and she was attributing the pain to it.   It is associated with nausea and decrease appetite. For the last 3 days pain is mostly in right upper quadrant and it is getting worse so she was brought to ER.   Denies fever, urinary symptoms, diarrhea or vomiting. Denies shoulder injury in past.     1) Acute Cholecystitis  - Blood Cultures and Procalcitonin  - Clear Liquid Diet  - Start Zosyn  - IR guided Cholecystostomy tube in am  - Not a candidate for surgery as per ED discussion with surgical team  - ID Consult  2) MARY  - Hold Enalapril and HCTZ  - Gentle hydration  - Avoid nephrotoxic medications  - Monitor renal function  3) Hypercalcemia  - Likely secondary to dehydration  - Repeat after IVF  4) Elevated BNP  - No history of CHF  - Patient is euvolemic  - ECHO  5) Rotator Cuff Tear  - Pain control  - Ortho Consult  6) HTN  - stable  - Hold Enalapril and HCTZ for now  7) HLD  - Continue Statins  8) GERD  - Protonix 40 mg  DVT Prophylaxis -- IPC. Will start Heparin/Lovenox after Cholecystostomy tube.     Dispo: Home in 4-5 days.

## 2019-05-16 NOTE — ED ADULT NURSE NOTE - OBJECTIVE STATEMENT
Pt presents to ed with RT shoulder pain radiating to lower back. Pain started five days ago 10/10 while pt was going into truck. Pt also fell two days ago and since pain has increase. Pt also has epigastric pain which started today. Denies sob, chest pain, NVD, fevers and chills.

## 2019-05-16 NOTE — CONSULT NOTE ADULT - SUBJECTIVE AND OBJECTIVE BOX
HPI: 89 yo F w/ CC of RUQ abdominal pain. Onset: Saturday night. Never experienced this type of pain before. Family initially felt it was possibly that she pulled a muscle, however, the pain was persistent. Achy in quality, constant, staying around the same level. Complains of some right shoulder pain as well. Complains of nausea, no vomiting. Last BM was this morning - normal in color and consistency. Per daughter, patient is having foul smelling urine, but denies burning with urination. Denies fevers/chills. Denies SOB. Currently stating would like to avoid surgical intervention.     Pmhx: HTN, OA, Hx of Colon cancer, HLD, Chronic large hiatal hernia  Pshx: Colectomy  Meds: Lasix, Enalapril, Aspirin, Celebrex, HCTZ, Montelukast, Statin  Allergies: NKDA, Seasonal allergies  Shx: Denies x 3      MEDICATIONS  (STANDING):  sodium chloride 0.9%. 1000 milliLiter(s) (125 mL/Hr) IV Continuous <Continuous>    MEDICATIONS  (PRN):      Vital Signs Last 24 Hrs  T(C): 36.4 (16 May 2019 14:20), Max: 36.7 (16 May 2019 11:09)  T(F): 97.5 (16 May 2019 14:20), Max: 98 (16 May 2019 11:09)  HR: 93 (16 May 2019 14:20) (58 - 93)  BP: 105/66 (16 May 2019 14:20) (100/66 - 105/66)  BP(mean): --  RR: 18 (16 May 2019 14:20) (18 - 18)  SpO2: 96% (16 May 2019 14:20) (96% - 96%)    PE  Gen: AOX3, NAD  Pulm: Non labored breathing  CV: RRR  Abd: Soft, ND, +TTP in RUQ, +Pierce's sign, no rebound or guarding  Ext: Moving all 4 extremities  Vasc: +1 radial pulses bilaterally  Neuro: AOX3      I&O's Detail      LABS:                        13.2   10.0  )-----------( 247      ( 16 May 2019 13:19 )             39.5     05-16    139  |  102  |  79.0<H>  ----------------------------<  111  3.7   |  20.0<L>  |  2.73<H>    Ca    11.1<H>      16 May 2019 13:19  Mg     1.6     05-16    TPro  6.4<L>  /  Alb  2.7<L>  /  TBili  1.3  /  DBili  x   /  AST  11  /  ALT  15  /  AlkPhos  123<H>  05-16    PT/INR - ( 16 May 2019 13:19 )   PT: 12.4 sec;   INR: 1.08 ratio         PTT - ( 16 May 2019 13:19 )  PTT:28.7 sec      RADIOLOGY & ADDITIONAL STUDIES: HPI: 89 yo F w/ CC of R shoulder/RUQ abdominal pain. Onset: Saturday night. Never experienced this type of pain before. Family initially felt it was possibly that she pulled a muscle, however, the pain was persistent. Achy in quality, constant, staying around the same level. Complains of some right shoulder pain as well. Complains of nausea, no vomiting. Last BM was this morning - normal in color and consistency. Per daughter, patient is having foul smelling urine, but denies burning with urination. Denies fevers/chills. Complains of SOB, sleeps with 3 pillows at night.     Pmhx: HTN, OA, Hx of Colon cancer, HLD, Chronic large hiatal hernia  Pshx: Colectomy  Meds: Lasix, Enalapril, Aspirin, Celebrex, HCTZ, Montelukast, Statin  Allergies: NKDA, Seasonal allergies  Shx: Denies x 3      MEDICATIONS  (STANDING):  sodium chloride 0.9%. 1000 milliLiter(s) (125 mL/Hr) IV Continuous <Continuous>    MEDICATIONS  (PRN):      Vital Signs Last 24 Hrs  T(C): 36.4 (16 May 2019 14:20), Max: 36.7 (16 May 2019 11:09)  T(F): 97.5 (16 May 2019 14:20), Max: 98 (16 May 2019 11:09)  HR: 93 (16 May 2019 14:20) (58 - 93)  BP: 105/66 (16 May 2019 14:20) (100/66 - 105/66)  BP(mean): --  RR: 18 (16 May 2019 14:20) (18 - 18)  SpO2: 96% (16 May 2019 14:20) (96% - 96%)    PE  Gen: AOX3, NAD  Pulm: Non labored breathing  CV: RRR  Abd: Soft, ND, +TTP in RUQ, RLQ and flank, no rebound or guarding  Ext: Moving all 4 extremities  Vasc: +1 radial pulses bilaterally  Neuro: AOX3      I&O's Detail      LABS:                        13.2   10.0  )-----------( 247      ( 16 May 2019 13:19 )             39.5     05-16    139  |  102  |  79.0<H>  ----------------------------<  111  3.7   |  20.0<L>  |  2.73<H>    Ca    11.1<H>      16 May 2019 13:19  Mg     1.6     05-16    TPro  6.4<L>  /  Alb  2.7<L>  /  TBili  1.3  /  DBili  x   /  AST  11  /  ALT  15  /  AlkPhos  123<H>  05-16    PT/INR - ( 16 May 2019 13:19 )   PT: 12.4 sec;   INR: 1.08 ratio         PTT - ( 16 May 2019 13:19 )  PTT:28.7 sec      RADIOLOGY & ADDITIONAL STUDIES:

## 2019-05-16 NOTE — H&P ADULT - NSHPLABSRESULTS_GEN_ALL_CORE
LABS:                        13.2   10.0  )-----------( 247      ( 16 May 2019 13:19 )             39.5     05-16    139  |  102  |  79.0<H>  ----------------------------<  111  3.7   |  20.0<L>  |  2.73<H>    Ca    11.1<H>      16 May 2019 13:19  Mg     1.6     05-16    TPro  6.4<L>  /  Alb  2.7<L>  /  TBili  1.3  /  DBili  x   /  AST  11  /  ALT  15  /  AlkPhos  123<H>  05-16    PT/INR - ( 16 May 2019 13:19 )   PT: 12.4 sec;   INR: 1.08 ratio         PTT - ( 16 May 2019 13:19 )  PTT:28.7 sec  CARDIAC MARKERS ( 16 May 2019 13:19 )  x     / <0.01 ng/mL / 9 U/L / x     / x        Xray Chest 1 View AP/PA. (05.16.19 @ 13:05)  Very large hiatal hernia and bilateral rotator cuff degeneration again noted.    Xray Shoulder 2 Views, Right (05.16.19 @ 13:04)  Severely narrow edacromiohumeral interval predisposes to rotator cuff tear. No fracture    US Gallbladder (05.16.19 @ 13:40)  CHOLELITHIASIS. POSITIVE ESCOBAR SPINE. MILDLY DILATED COMMON BILE DUCT MEASURING 7 MM.   FINDINGS  CONCERNING FOR ACUTE CHOLECYSTITIS..

## 2019-05-16 NOTE — ED ADULT NURSE NOTE - NSIMPLEMENTINTERV_GEN_ALL_ED
Implemented All Fall with Harm Risk Interventions:  Coventry to call system. Call bell, personal items and telephone within reach. Instruct patient to call for assistance. Room bathroom lighting operational. Non-slip footwear when patient is off stretcher. Physically safe environment: no spills, clutter or unnecessary equipment. Stretcher in lowest position, wheels locked, appropriate side rails in place. Provide visual cue, wrist band, yellow gown, etc. Monitor gait and stability. Monitor for mental status changes and reorient to person, place, and time. Review medications for side effects contributing to fall risk. Reinforce activity limits and safety measures with patient and family. Provide visual clues: red socks.

## 2019-05-16 NOTE — CONSULT NOTE ADULT - ASSESSMENT
89 yo F w/ US and clinical findings suggestive of acute cholecystitis    Admit to medicine for medical optimization.   MARY - Per daughter, patient had labs as OP 3 months ago that revealed elevated creatinine. Was suppose to receive OP renal US, but never followed up. Will need fluid resuscitation/FeNa/Renal US/Trend creatitine/Strict I+O.  Elevated BNP - CHF exacerbation? Will need Echo for further evaluation.  NPO/IVF  IV abx  Currently patient and patient family is leaning towards no surgical intervention, recommend IR consultation with percutaneous cholecystostomy tube placement. Would like most minimally invasive intervention as possible.  Will continue to follow 89 yo F w/ CHF exacerbation    Admit to medicine for medical management  MARY - Per daughter, patient had labs as OP 3 months ago that revealed elevated creatinine. Was suppose to receive OP renal US, but never followed up. Will need fluid resuscitation/FeNa/Renal US/Trend creatitine/Strict I+O.  Elevated BNP - CHF exacerbation? Will need Echo for further evaluation.  NPO/IVF  No concern for acute cholecystitis at this time. No surgical intervention needed. 89 yo F w/ CHF exacerbation    Admit to medicine for medical management  MARY - Per daughter, patient had labs as OP 3 months ago that revealed elevated creatinine. Was suppose to receive OP renal US, but never followed up. Will need fluid resuscitation/FeNa/Renal US/Trend creatitine/Strict I+O.  Elevated BNP - Will need Echo for further evaluation.  NPO/IVF  No concern for acute cholecystitis at this time. No surgical intervention needed.

## 2019-05-16 NOTE — ED PROVIDER NOTE - PROGRESS NOTE DETAILS
d/w surgery: not great candidate for surgery at this time; recommending treatment/optimization of [place], continue antibiotics, and perc natacha tube; d/w hospitalist for admission

## 2019-05-16 NOTE — ED ADULT NURSE NOTE - GASTROINTESTINAL WDL
Abdomen soft, nontender, nondistended, bowel sounds present in all 4 quadrants. Abdomen soft, nontender,

## 2019-05-16 NOTE — H&P ADULT - NSICDXPASTMEDICALHX_GEN_ALL_CORE_FT
PAST MEDICAL HISTORY:  Colon cancer     Hiatal hernia     HLD (hyperlipidemia)     HTN (hypertension)

## 2019-05-16 NOTE — H&P ADULT - NSHPSOCIALHISTORY_GEN_ALL_CORE
Lives with daughter.   Walks with cane.  Former smoker - quit 50 years ago.  No alcohol or illicit drug use.

## 2019-05-17 NOTE — ED ADULT NURSE REASSESSMENT NOTE - NS ED NURSE REASSESS COMMENT FT1
Pt AOx 2, disoriented to time. Pt breathing equal and unlabored, color good. Pt has no complaints at this time. Pt states "my shoulder feels so much better now". Pt resting comfortably. POC explained and pt verbalized understanding.

## 2019-05-17 NOTE — CONSULT NOTE ADULT - ASSESSMENT
This 89 years old male with PMH of Colon Cancer s/p Resection, Hiatal Hernia, HTN and HLD brought by family with right upper quadrant and right shoulder pain    had sonographic moy sign, but negative HIDA  right sided flank pain  ? kidney related  ? pyelonephritis/ stones    - will continue zosyn  - check renal US to evaluate for stones.     - follow up all outstanding cultures  - trend temperature and WBC curve  - repeat cultures from blood and all sources if febrile.

## 2019-05-17 NOTE — CONSULT NOTE ADULT - SUBJECTIVE AND OBJECTIVE BOX
X-Rays: Right Shoulder  EXAM:  SHOULDER COMP  MIN 2 VIEWS-RT                          PROCEDURE DATE:  05/16/2019          INTERPRETATION:  Radiographs of the RIGHT shoulder         CLINICAL INFORMATION: Injury with Pain.    TECHNIQUE:  Frontal views in internal and external rotation of the   shoulder were obtained.  A Y-view was also obtained.    FINDINGS:   No prior examinations are available for review.    There is severe narrowing of the acromiohumeral interval with sclerosis   consistent with rotator cuff impingement syndrome. Rotator cuff tear   should be considered.    No fracture or gross dislocation seen. There is a superior migration of   humeral head relation to the osseous glenoid. Scapular ribs and clavicle   intact.   IMPRESSION:   Severely narrowedacromiohumeral interval predisposes to   rotator cuff tear. No fracture                    JOANA PIZARRO M.D., ATTENDING RADIOLOGIST  This document has been electronically signed. May 16 2019  1:05PM      A/P: Right Shoulder Pain  - OT, WBAT  - Sling for comfort  - Pain medication as needed  - No need of further evaluation Asked by the medical attending to evaluate a 89 year old female that was brought in to the [facility] ED to be evaluated for Right Shoulder Pain.  Patient is found sitting up in stretcher in the ED.  Family members are at bedside.  With the help of the family members history was obtained. Patient is Right Hand Dominant and  Approximately 5 days ago patient was helping herself get into a car using her Right arm to pull herself up.  Following this incident patient started having pain that progressively got worse.  She denies any other recent trauma like falls.  Medical team ordered X-rays to evaluate shoulder for fractures.  Patient also has complaints of medical issues.  She will be admitted to the hospital for medical workup.      PMHx:  Colon CA  HTN    PSHx:  H/O hemicolectomy    Allergies:  NKDA    Review of Systems:  Muscular Skeletal: Right Shoulder Pain  Remaining systems were reviewed and found negative from the history obtained    Vital Signs Last 24 Hrs  T(C): 36.5 (17 May 2019 19:39), Max: 36.5 (17 May 2019 19:39)  T(F): 97.7 (17 May 2019 19:39), Max: 97.7 (17 May 2019 19:39)  HR: 86 (17 May 2019 21:33) (80 - 90)  BP: 94/59 (17 May 2019 21:33) (76/44 - 104/69)  BP(mean): --  RR: 16 (17 May 2019 19:39) (16 - 18)  SpO2: 95% (17 May 2019 19:39) (94% - 96%)    Right Upper Extremity:  Presently in sling, removed from sling for exam  Tenderness with palpation over the bicep insertion as well as great tubersity  No redness noted  Decrease ROM of shoulder secondary to pain  PROM illicit some pain  + Sensation  No ecchymosis or signs of trauma  Some Swelling noted  2+ Radial pulse    X-Rays: Right Shoulder  EXAM:  SHOULDER COMP  MIN 2 VIEWS-RT                          PROCEDURE DATE:  05/16/2019          INTERPRETATION:  Radiographs of the RIGHT shoulder         CLINICAL INFORMATION: Injury with Pain.    TECHNIQUE:  Frontal views in internal and external rotation of the   shoulder were obtained.  A Y-view was also obtained.    FINDINGS:   No prior examinations are available for review.    There is severe narrowing of the acromiohumeral interval with sclerosis   consistent with rotator cuff impingement syndrome. Rotator cuff tear   should be considered.    No fracture or gross dislocation seen. There is a superior migration of   humeral head relation to the osseous glenoid. Scapular ribs and clavicle   intact.   IMPRESSION:   Severely narrowedacromiohumeral interval predisposes to   rotator cuff tear. No fracture                    JOANA PIZARRO M.D., ATTENDING RADIOLOGIST  This document has been electronically signed. May 16 2019  1:05PM      A/P: Right Shoulder Pain  - OT, WBAT  - Sling for comfort  - Pain medication as needed  - No need of further evaluation, non surgical intervention required  - Follow up with Dr. Muñoz or Family Orthopedist once discharged from hospital

## 2019-05-17 NOTE — CONSULT NOTE ADULT - SUBJECTIVE AND OBJECTIVE BOX
[facility]  INFECTIOUS DISEASES AND INTERNAL MEDICINE at [place]  =======================================================  Elio Rendon MD  Diplomates American Board of Internal Medicine and Infectious Diseases  Telephone [phone]  Fax            [phone]  =======================================================    [id number]  BETY TRINIDAD   This 89 years old male with PMH of Colon Cancer s/p Resection, Hiatal Hernia, HTN and HLD brought by family with right upper quadrant and right shoulder pain. As per patient, pain started 5 days ago on back side of right chest and then radiated to right shoulder and right upper quadrant. She pulled her right shoulder while getting into her son's truck 5 days ago and she was attributing the pain to it.   It is associated with nausea and decrease appetite. For the last 3 days pain is mostly in right upper quadrant and it is getting worse so she was brought to ER.   Denies fever, urinary symptoms, diarrhea or vomiting. Denies shoulder injury in past.   Patient had a RUQ sono, found with thickened GB wall of 3.2 mm, also stones in GB and Sonographic moy sign.  HIDA scan done today was negative for acute cholecystitis.     additional hx obtain from daughter and son-in-law at bedside.  Had stronger smelling urine in the last few days, associated with decreased appetite.     patient had been started empirically on Zosyn by admitting team.     =======================================================  Past Medical & Surgical Hx:  =====================  PAST MEDICAL & SURGICAL HISTORY:  HLD (hyperlipidemia)  Hiatal hernia  Colon cancer  HTN (hypertension)  H/O hemicolectomy      Problem List:  ==========  HEALTH ISSUES - PROBLEM Dx:       Social Hx:  =======  no toxic habits currently    FAMILY HISTORY:  No pertinent family history in first degree relatives  no significant family history of immunosuppressive disorders in mother or father   =======================================================  REVIEW OF SYSTEMS:  as above  all other ROS negative  =======================================================  Allergies  No Known Allergies     Antibiotics:  piperacillin/tazobactam IVPB. 3.375 Gram(s) IV Intermittent every 12 hours    Other medications:  aspirin  chewable 81 milliGRAM(s) Oral daily  atorvastatin 10 milliGRAM(s) Oral at bedtime  heparin  Injectable 5000 Unit(s) SubCutaneous every 12 hours  lidocaine   Patch 1 Patch Transdermal daily  montelukast 10 milliGRAM(s) Oral daily  pantoprazole    Tablet 40 milliGRAM(s) Oral before breakfast  saccharomyces boulardii 250 milliGRAM(s) Oral two times a day  senna 2 Tablet(s) Oral at bedtime       piperacillin/tazobactam IVPB.   25 mL/Hr IV Intermittent (05-17-19 @ 05:04)   25 mL/Hr IV Intermittent (05-17-19 @ 14:34)   200 mL/Hr IV Intermittent (05-16-19 @ 15:21)      ======================================================  Physical Exam:  ============  T(F): 97.4 (17 May 2019 16:15), Max: 97.6 (17 May 2019 05:35)  HR: 87 (17 May 2019 16:15)  BP: 87/52 (17 May 2019 16:15)  RR: 18 (17 May 2019 16:15)  SpO2: 94% (17 May 2019 16:15) (94% - 96%)  temp max in last 48H T(F): , Max: 98 (05-16-19 @ 11:09)    General:  No acute distress. FRAIL   Eye: Pupils are equal, round and reactive to light, Extraocular movements are intact, Normal conjunctiva.  HENT: Normocephalic, Oral mucosa is DRY, EDENTULOUS  Neck: Supple, No lymphadenopathy.  Respiratory: Lungs are clear to auscultation, Respirations are non-labored.  Cardiovascular: Normal rate, Regular rhythm,   Gastrointestinal: Soft,  ACTIVE BOWEL SOUNDS,   TENDERNESS TO PERCUSSION ALONG RIGHT LOWER BACK  Genitourinary: No costovertebral angle tenderness.  Lymphatics: No lymphadenopathy neck,   Musculoskeletal: Normal range of motion, Normal strength.  Integumentary: No rash.  Neurologic: Alert, Oriented, No focal deficits, Cranial Nerves II-XII are grossly intact.  Psychiatric: Appropriate mood & affect.    =======================================================  Labs:                        13.2   10.0  )-----------( 247      ( 16 May 2019 13:19 )             39.5       WBC Count: 10.0 K/uL (05-16-19 @ 13:19)      05-16    139  |  102  |  79.0<H>  ----------------------------<  111  3.7   |  20.0<L>  |  2.73<H>    Ca    11.1<H>      16 May 2019 13:19  Mg     1.6     05-16    TPro  6.4<L>  /  Alb  2.7<L>  /  TBili  1.3  /  DBili  x   /  AST  11  /  ALT  15  /  AlkPhos  123<H>  05-16      Culture - Urine (collected 05-16-19 @ 17:53)  Source: .Urine  Final Report (05-17-19 @ 17:09):    <10,000 CFU/ml Corynebacterium species        Urinalysis (05.16.19 @ 17:52)    pH Urine: 5.0    Glucose Qualitative, Urine: 50 mg/dL    Blood, Urine: Moderate    Color: Yellow    Urine Appearance: Clear    Bilirubin: Small    Ketone - Urine: Negative    Specific Gravity: 1.015    Protein, Urine: 30 mg/dL    Urobilinogen: 1 mg/dL    Nitrite: Negative    Leukocyte Esterase Concentration: Trace

## 2019-05-17 NOTE — PROGRESS NOTE ADULT - SUBJECTIVE AND OBJECTIVE BOX
INTERVAL HPI/OVERNIGHT EVENTS:  No acute overnight events reported. Patient seen this morning with resolving RUQ tenderness. HIDA scan was negative with no signs of acute cholecystitis. No leukocytosis.      MEDICATIONS  (STANDING):  atorvastatin 10 milliGRAM(s) Oral at bedtime  lidocaine   Patch 1 Patch Transdermal daily  montelukast 10 milliGRAM(s) Oral daily  pantoprazole    Tablet 40 milliGRAM(s) Oral before breakfast  piperacillin/tazobactam IVPB. 3.375 Gram(s) IV Intermittent every 12 hours  saccharomyces boulardii 250 milliGRAM(s) Oral two times a day  senna 2 Tablet(s) Oral at bedtime    MEDICATIONS  (PRN):  acetaminophen   Tablet .. 650 milliGRAM(s) Oral every 6 hours PRN Temp greater or equal to 38C (100.4F), Mild Pain (1 - 3)  ondansetron Injectable 4 milliGRAM(s) IV Push every 6 hours PRN Nausea and/or Vomiting  oxyCODONE    IR 5 milliGRAM(s) Oral every 4 hours PRN Moderate Pain (4 - 6)  oxyCODONE    IR 10 milliGRAM(s) Oral every 4 hours PRN Severe Pain (7 - 10)  polyethylene glycol 3350 17 Gram(s) Oral daily PRN Constipation      Vital Signs Last 24 Hrs  T(C): 36.4 (17 May 2019 11:33), Max: 36.4 (16 May 2019 14:20)  T(F): 97.6 (17 May 2019 11:33), Max: 97.6 (17 May 2019 05:35)  HR: 87 (17 May 2019 11:33) (80 - 93)  BP: 90/59 (17 May 2019 11:33) (88/52 - 105/66)  BP(mean): --  RR: 18 (17 May 2019 11:33) (18 - 18)  SpO2: 95% (17 May 2019 11:33) (95% - 96%)    PE  Gen: Not in acute distress  Pulm: nonlabored breathing  CV: S1, S2  Abd: Soft, nontender, nondistended  Ext: no pitting edema   Vasc: 2+ radial and PT pulses B/L  Neuro: AAOX3      I&O's Detail      LABS:                        13.2   10.0  )-----------( 247      ( 16 May 2019 13:19 )             39.5     05-16    139  |  102  |  79.0<H>  ----------------------------<  111  3.7   |  20.0<L>  |  2.73<H>    Ca    11.1<H>      16 May 2019 13:19  Mg     1.6         TPro  6.4<L>  /  Alb  2.7<L>  /  TBili  1.3  /  DBili  x   /  AST  11  /  ALT  15  /  AlkPhos  123<H>      PT/INR - ( 16 May 2019 13:19 )   PT: 12.4 sec;   INR: 1.08 ratio         PTT - ( 16 May 2019 13:19 )  PTT:28.7 sec  Urinalysis Basic - ( 16 May 2019 17:52 )    Color: Yellow / Appearance: Clear / S.015 / pH: x  Gluc: x / Ketone: Negative  / Bili: Small / Urobili: 1 mg/dL   Blood: x / Protein: 30 mg/dL / Nitrite: Negative   Leuk Esterase: Trace / RBC: 0-2 /HPF / WBC 3-5   Sq Epi: x / Non Sq Epi: Moderate / Bacteria: Few        RADIOLOGY & ADDITIONAL STUDIES:

## 2019-05-17 NOTE — ED ADULT NURSE REASSESSMENT NOTE - NS ED NURSE REASSESS COMMENT FT1
pt returned from nuclear med, pt status unchanged, refer to flowsheet and chart, pt safety maintained, pt hemodynamically stable, will conitnue to monitor, pt right arm placed in sling as per MD

## 2019-05-17 NOTE — ED ADULT NURSE REASSESSMENT NOTE - NS ED NURSE REASSESS COMMENT FT1
received report from night RN Judson/Jonathan on pt who is in nuclear med for diagnostic procedure, no change in pt status according to night RN, pt currently being monitored by radiology RN, pending return to unit for further evaluation

## 2019-05-17 NOTE — CONSULT NOTE ADULT - ATTENDING COMMENTS
Ortho Trauma Attending:  Agree with above PA note.  Note edited where necessary.      Shoulder pain likely from acute on chronic rotator cuff injury. No acute treatment warranted at this time. If still symptomatic after this acute illness workup, would recommend NSAIDs, Physical therapy. May follow up as outpatient PRN.    Bernardino Muñoz MD  Orthopaedic Trauma Surgery
Patient seen and examined.   per daughter right shoulder pain and abd discomfort stared as she was trying to gen into a SUB 4 days ago.  right jay abdomen is better upon my examination, pain is constant and not radiated and not associated to food, she is hungry,  On exam she si not dyspneic  but had history of orthopnea, positive CVJ  her abdomen dis obese, there is global discomfort at right abdomen. there is pain on deep palpation and a negative Pierce signs.  U/S reviewed  wit dr Angel from IR, no local inflammation, there are no local signs of cholecystitis per US.  CXR with cephalization lines and elevated BNP    I do not believe this patient at this time have any biliary issues, he pain could a result fo liver distention from CHF and muscular from the above  incident.  I will not offer any surgical or percutaneous intervention.  We shall follow with you.

## 2019-05-17 NOTE — PROGRESS NOTE ADULT - ASSESSMENT
89 years old male with PMH of Colon Cancer s/p Resection, Hiatal Hernia, HTN and HLD brought by family with right upper quadrant and right shoulder pain. As per patient, pain started 5 days ago on back side of right chest and then radiated to right shoulder and right upper quadrant. She pulled her right shoulder while getting into her son's truck 5 days ago and she was attributing the pain to it.   It is associated with nausea and decrease appetite. For the last 3 days pain is mostly in right upper quadrant and it is getting worse so she was brought to ER. Denies fever, urinary symptoms, diarrhea or vomiting. Denies shoulder injury in past.     1) RUQ Pain   - Ruled out Acute Cholecystitis  - Surgery input appreciated  2) MARY  - Hold Enalapril and HCTZ  - Avoid nephrotoxic medications  - Monitor renal function  3) Hypercalcemia  - Likely secondary to dehydration  - Repeat labs pending  4) Elevated BNP  - No history of CHF  - Patient is euvolemic  - ECHO pending  5) Rotator Cuff Tear  - Pain control  - Right arm sling  - Ortho Consult appreciated  6) HTN  - BP on lower side  - Hold Enalapril and HCTZ for now  7) HLD  - Continue Statins  8) GERD  - Protonix 40 mg  9) Elevated Procalcitonin  - Initially acute cholecystitis was suspected and she was started on Zosyn. Blood cultures are pending. ID eval is requested.    DVT Prophylaxis -- Heparin 5000 Units    Dispo: Home pending clinical improvement.

## 2019-05-17 NOTE — PROGRESS NOTE ADULT - ASSESSMENT
A/P: 89 year old female with a CHF exacerbation and a negative HIDA scan seen to be doing well.    -Recommend outpatient GI evaluation for possible acid reflux from hiatal hernia   -pain management   -rest of care per primary team   -No surgical intervention warranted at this moment

## 2019-05-17 NOTE — PROGRESS NOTE ADULT - SUBJECTIVE AND OBJECTIVE BOX
Cholecystitis  HPI:  89 years old male with PMH of Colon Cancer s/p Resection, Hiatal Hernia, HTN and HLD brought by family with right upper quadrant and right shoulder pain. As per patient, pain started 5 days ago on back side of right chest and then radiated to right shoulder and right upper quadrant. She pulled her right shoulder while getting into her son's truck 5 days ago and she was attributing the pain to it.   It is associated with nausea and decrease appetite. For the last 3 days pain is mostly in right upper quadrant and it is getting worse so she was brought to ER.   Denies fever, urinary symptoms, diarrhea or vomiting. Denies shoulder injury in past. (16 May 2019 17:30)    Interval History:  Patient was seen and examined at bedside around 10:45 am. Very thirsty as she has been NPO since midnight. Pain in RUQ and Shoulder is better controlled with current medications.   Denies chest pain, palpitations, shortness of breath, headache, dizziness, visual symptoms, nausea or vomiting.    ROS:  As per interval history otherwise unremarkable.    PHYSICAL EXAM:  Vital Signs   T(C): 36.4 (17 May 2019 11:33), Max: 36.4 (16 May 2019 21:16)  T(F): 97.6 (17 May 2019 11:33), Max: 97.6 (17 May 2019 05:35)  HR: 87 (17 May 2019 11:33) (80 - 87)  BP: 90/59 (17 May 2019 11:33) (88/52 - 104/69)  RR: 18 (17 May 2019 11:33) (18 - 18)  SpO2: 95% (17 May 2019 11:33) (95% - 96%)  General: Elderly female lying in bed comfortably. No acute distress  HEENT: EOMI. Clear conjunctivae. Dry mucus membrane. Hearing aids in place.   Neck: Supple.   Chest: CTA bilaterally - no wheezing, rales or rhonchi. No chest wall tenderness.  Heart: Normal S1 & S2 with RRR.   Abdomen: Soft. Tender in RUQ. Non-distended. + BS  Ext: 1+ pedal edema. No calf tenderness. Right Shoulder: Tender. Decreased ROM. Skin clear.  Neuro: Active and alert. No focal deficit. No speech disorder.  Skin: Warm and Dry  Psychiatry: Normal mood and affect    MEDICATIONS  (STANDING):  atorvastatin 10 milliGRAM(s) Oral at bedtime  lidocaine   Patch 1 Patch Transdermal daily  montelukast 10 milliGRAM(s) Oral daily  pantoprazole    Tablet 40 milliGRAM(s) Oral before breakfast  piperacillin/tazobactam IVPB. 3.375 Gram(s) IV Intermittent every 12 hours  saccharomyces boulardii 250 milliGRAM(s) Oral two times a day  senna 2 Tablet(s) Oral at bedtime    MEDICATIONS  (PRN):  acetaminophen   Tablet .. 650 milliGRAM(s) Oral every 6 hours PRN Temp greater or equal to 38C (100.4F), Mild Pain (1 - 3)  ondansetron Injectable 4 milliGRAM(s) IV Push every 6 hours PRN Nausea and/or Vomiting  oxyCODONE    IR 5 milliGRAM(s) Oral every 4 hours PRN Moderate Pain (4 - 6)  oxyCODONE    IR 10 milliGRAM(s) Oral every 4 hours PRN Severe Pain (7 - 10)  polyethylene glycol 3350 17 Gram(s) Oral daily PRN Constipation    LABS:                        13.2   10.0  )-----------( 247      ( 16 May 2019 13:19 )             39.5     05-    139  |  102  |  79.0<H>  ----------------------------<  111  3.7   |  20.0<L>  |  2.73<H>    Ca    11.1<H>      16 May 2019 13:19  Mg     1.6         TPro  6.4<L>  /  Alb  2.7<L>  /  TBili  1.3  /  DBili  x   /  AST  11  /  ALT  15  /  AlkPhos  123<H>  16    PT/INR - ( 16 May 2019 13:19 )   PT: 12.4 sec;   INR: 1.08 ratio         PTT - ( 16 May 2019 13:19 )  PTT:28.7 sec  Urinalysis Basic - ( 16 May 2019 17:52 )    Color: Yellow / Appearance: Clear / S.015 / pH: x  Gluc: x / Ketone: Negative  / Bili: Small / Urobili: 1 mg/dL   Blood: x / Protein: 30 mg/dL / Nitrite: Negative   Leuk Esterase: Trace / RBC: 0-2 /HPF / WBC 3-5   Sq Epi: x / Non Sq Epi: Moderate / Bacteria: Few    RADIOLOGY & ADDITIONAL STUDIES:  Xray Chest 1 View AP/PA. (19 @ 13:05)  Very large hiatal hernia and bilateral rotator cuff degeneration again noted.    Xray Shoulder 2 Views, Right (19 @ 13:04)  Severely narrow edacromiohumeral interval predisposes to rotator cuff tear. No fracture    US Gallbladder (19 @ 13:40)  CHOLELITHIASIS. POSITIVE ESCOBAR SPINE. MILDLY DILATED COMMON BILE DUCT MEASURING 7 MM.   FINDINGS  CONCERNING FOR ACUTE CHOLECYSTITIS..    NM Hepatobiliary Imaging (19 @ 09:20)  Normal hepatobiliary scan.  No scan evidence of acute cholecystitis.

## 2019-05-17 NOTE — CHART NOTE - NSCHARTNOTEFT_GEN_A_CORE
PA called by RN to report patient BP 76/44, pt asymptomatic. T 97.7 F oral, P 90, RR 18, O2 sat 98% on room air. Pt admitted for RUQ and right shoulder pain, decreased appetite. W/U in progress. BP soft during this admission. Pt sleeping, NAD. Per RN no change in pt status from this AM. Antihypertensives on hold due to soft BP. BNP elevated 2638, IVF d/c'd today, no h/o CHF. ECHO pending. Will order 500mL sodium chloride bolus fluid challenge, gentle hydration, and repeat BP post administration. Pt remains afebrile, no leukocytosis, blood cultures pending results. Monitor temps. RN to continue to monitor pt and notify PA of any changes in pt status. PA called by RN to report patient BP 76/44, pt asymptomatic. T 97.7 F oral, P 90, RR 18, O2 sat 98% on room air. Pt admitted for RUQ and right shoulder pain, decreased appetite. W/U in progress. BP soft during this admission. Pt sleeping, NAD. Easily arousable to verbal stimuli. Pt reports "I'm thirsty" otherwise denies CP, SOB, dizziness, fever, N/V. Per RN no change in pt status from this AM. Antihypertensives on hold due to soft BP. BNP elevated 2638, IVF d/c'd today, no h/o CHF. ECHO pending. Lungs CTA B/L, no wheezing, rhonchi or rales. Will order 500mL sodium chloride bolus fluid challenge, gentle hydration, and repeat BP post administration. Pt remains afebrile, no leukocytosis, blood cultures pending results. Monitor temps. RN to continue to monitor pt and notify PA of any changes in pt status. PA called by RN to report patient BP 76/44, pt asymptomatic. T 97.7 F oral, P 90, RR 18, O2 sat 98% on room air. Pt admitted for RUQ and right shoulder pain, decreased appetite. W/U in progress. BP soft during this admission. Pt sleeping, NAD. Easily arousable to verbal stimuli. Pt reports "I'm thirsty" otherwise denies CP, SOB, dizziness, fever, N/V. Per RN no change in pt status from this AM. Antihypertensives on hold due to soft BP. BNP elevated 2638, IVF d/c'd today, no h/o CHF. ECHO pending. Lungs CTA B/L, no wheezing, rhonchi or rales. Will order 500mL sodium chloride bolus fluid challenge, gentle hydration, and repeat BP post administration. Pt remains afebrile, no leukocytosis, blood cultures pending results. Monitor temps. RN to continue to monitor pt and notify PA of any changes in pt status.    21:45 F/U BP 94/59, P 85, MAP 71mmHg. Pt asymptomatic. Continue to monitor.

## 2019-05-17 NOTE — PROGRESS NOTE ADULT - ATTENDING COMMENTS
Seen and examined,    No abd pain today, N/V    NAD  Abd soft NT    HIDA - No cholecystitis.      No surgical intervention needed.  DDx would consider duodenitis/PUD.  hepatic congestion from RV overload.  Will sign off.  Please call with questions, concerns, change in clinical status.

## 2019-05-17 NOTE — ED ADULT NURSE REASSESSMENT NOTE - NS ED NURSE REASSESS COMMENT FT1
Pt disoriented to time. Pt has no medical complaints at this time. Pt POC explained and verbalized understanding. Pt awaiting IR.

## 2019-05-18 NOTE — PROGRESS NOTE ADULT - SUBJECTIVE AND OBJECTIVE BOX
[facility]  INFECTIOUS DISEASES AND INTERNAL MEDICINE at [place]  =======================================================  Elio Rendon MD  Diplomates American Board of Internal Medicine and Infectious Diseases  Telephone [phone]  Fax            [phone]  =======================================================    [id number]  BETY TRINIDAD   follow up for:  fevers, right lower abd pain  patient seen and examined.     Ultrasound of kidneys reviewed:  Heterogeneous collection surrounding the inferior RIGHT kidney extending to the RIGHT lower quadrant of indeterminate etiology. Follow-up CT scan  of the abdomen with contrast recommended.    ===================================================  REVIEW OF SYSTEMS:  as above  all other ROS negative    =======================================================  Allergies  No Known Allergies    Antibiotics:  piperacillin/tazobactam IVPB. 3.375 Gram(s) IV Intermittent every 12 hours    Other medications:  aspirin  chewable 81 milliGRAM(s) Oral daily  atorvastatin 10 milliGRAM(s) Oral at bedtime  celecoxib 200 milliGRAM(s) Oral two times a day  heparin  Injectable 5000 Unit(s) SubCutaneous every 12 hours  lidocaine   Patch 1 Patch Transdermal daily  montelukast 10 milliGRAM(s) Oral daily  pantoprazole    Tablet 40 milliGRAM(s) Oral before breakfast  saccharomyces boulardii 250 milliGRAM(s) Oral two times a day  senna 2 Tablet(s) Oral at bedtime  sodium chloride 0.9%. 1000 milliLiter(s) IV Continuous <Continuous>    ======================================================  Physical Exam:  ============  T(F): 97.6 (18 May 2019 07:29), Max: 98.1 (18 May 2019 00:39)  HR: 77 (18 May 2019 11:08)  BP: 90/60 (18 May 2019 11:08)  RR: 18 (18 May 2019 11:08)  SpO2: 95% (18 May 2019 11:08) (93% - 97%)  temp max in last 48H T(F): , Max: 98.1 (05-18-19 @ 00:39)    General:  No acute distress. FRAIL   Eye: Pupils are equal, round and reactive to light, Extraocular movements are intact, Normal conjunctiva.  HENT: Normocephalic, Oral mucosa is DRY, EDENTULOUS  Neck: Supple, No lymphadenopathy.  Respiratory: Lungs are clear to auscultation, Respirations are non-labored.  Cardiovascular: Normal rate, Regular rhythm,   Gastrointestinal: Soft,  ACTIVE BOWEL SOUNDS, decreased tenderness to palpation of RIGHT lower abd  Genitourinary: No costovertebral angle tenderness.  Lymphatics: No lymphadenopathy neck,   Musculoskeletal: Normal range of motion, Normal strength.  Integumentary: No rash.  Neurologic: Alert, Oriented, No focal deficits, Cranial Nerves II-XII are grossly intact.  Psychiatric: Appropriate mood & affect.      =======================================================  Labs:      WBC Count: 10.0 K/uL (05-16-19 @ 13:19)       Culture - Urine (collected 05-16-19 @ 17:53)  Source: .Urine  Final Report (05-17-19 @ 17:09):    <10,000 CFU/ml Corynebacterium species     < from: US Renal (05.17.19 @ 19:15) >    IMPRESSION:  Heterogeneous collection surrounding the inferior RIGHT kidney extending to the RIGHT lower quadrant of indeterminate etiology. Follow-up CT scan  of the abdomen with contrast recommended.      JOANA PIZARRO M.D., ATTENDING RADIOLOGIST  This document has been electronically signed. May 17 2019  7:37PM        < end of copied text >

## 2019-05-18 NOTE — PROGRESS NOTE ADULT - ASSESSMENT
This 89 years old male with PMH of Colon Cancer s/p Resection, Hiatal Hernia, HTN and HLD brought by family with right upper quadrant and right shoulder pain    had sonographic moy sign, but negative HIDA  right sided flank pain  - ULTRASOUND shows collection surrouding lower right kidney  - ? abscess  - advise imaging.  CT scan limited due to renal failure precluding IV contrast  - ? MRI scan of area    - will continue zosyn    - follow up all outstanding cultures  - trend temperature and WBC curve  - repeat cultures from blood and all sources if febrile.

## 2019-05-18 NOTE — PROGRESS NOTE ADULT - SUBJECTIVE AND OBJECTIVE BOX
Patient is a 89y old  Female who presents with a chief complaint of RUQ , flank pain   cahrt reviewed , pt is seen in am   daughter is at the bedside   Pt is sleeping answer questions , pt 's with c/o flank pain over 1 week per daughter she thought muscle pain , also reported foul smelling urine last week , por oral intake for 2 weeks   no fever reported , she has cronic shoulder pain     Allergies    No Known Allergies    Intolerances        Vital Signs Last 24 Hrs  T(C): 36.4 (18 May 2019 07:29), Max: 36.7 (18 May 2019 00:39)  T(F): 97.6 (18 May 2019 07:29), Max: 98.1 (18 May 2019 00:39)  HR: 77 (18 May 2019 11:08) (75 - 90)  BP: 90/60 (18 May 2019 11:08) (70/50 - 94/59)  BP(mean): --  RR: 18 (18 May 2019 11:08) (16 - 18)  SpO2: 95% (18 May 2019 11:08) (93% - 97%)    PHYSICAL EXAM:    GENERAL: NAD, resting in the bed   CHEST/LUNG: Clear to auscultation diminished BS on the right lower chest    No rales, rhonchi, wheezing, or rubs  HEART: Regular rate and rhythm; No murmurs, rubs, or gallops  ABDOMEN: Soft, Nontender, Nondistended; Bowel sounds present  EXTREMITIES:   No clubbing, cyanosis, or edema  Musculoskletal : right costo vertebral tenderness       LABS:  pending           Urinalysis Basic - ( 16 May 2019 17:52 )    Color: Yellow / Appearance: Clear / S.015 / pH: x  Gluc: x / Ketone: Negative  / Bili: Small / Urobili: 1 mg/dL   Blood: x / Protein: 30 mg/dL / Nitrite: Negative   Leuk Esterase: Trace / RBC: 0-2 /HPF / WBC 3-5   Sq Epi: x / Non Sq Epi: Moderate / Bacteria: Few        RADIOLOGY & ADDITIONAL TESTS:

## 2019-05-18 NOTE — PROGRESS NOTE ADULT - ASSESSMENT
89 years old male with PMH of Colon Cancer s/p Resection, Hiatal Hernia, HTN and HLD brought by family with right upper quadrant and right shoulder pain. As per patient, pain started 5 days ago on back side of right chest and then radiated to right shoulder and right upper quadrant. She pulled her right shoulder while getting into her son's truck 5 days ago and she was attributing the pain to it.   It is associated with nausea and decrease appetite. For the last 3 days pain is mostly in right upper quadrant and flank area  and it is getting worse so she was brought to ER. Denies fever, urinary symptoms, diarrhea or vomiting. Pt admitted HIDA scan done cholecytsitis   ruled out , US of renal with abnormalities  on the right urethral  kidney area , CT of abd chest ordered , hypotensive fluid bolus given , pt is also with renal failure on admission labs , unknown baseline cr     1- Flank pain with abnormal UA , urine cx   cholecytistitis ruled out   possble UTI , r/p pyelonephritis   can not get CT with contrast due to high cr   no contrast CT of C/A/P ordered   cont iv zosyn , blood cx pending   2- ARF vs CRF   cont iv hydration   repeat lytes in am   3- GOC   discussed with daughter re overall prognosis , advnace directives , current condition and findings treatment plan   all questions answered   MOLTS form provided , pt 's daughter will review she states that her mom does not want to be on vent or resuscitated  total time spend 20 min discussing with pt's family at bedside

## 2019-05-18 NOTE — PROVIDER CONTACT NOTE (OTHER) - ASSESSMENT
All VS stable with exception of low BP. Pt afebrile. Rectal temp performed.
All vital signs stable with exception of low BP

## 2019-05-19 NOTE — PROGRESS NOTE ADULT - ASSESSMENT
89 years old male with PMH of Colon Cancer s/p Resection, Hiatal Hernia, HTN and HLD brought by family with right upper quadrant and right shoulder pain. As per patient, pain started 5 days ago on back side of right chest and then radiated to right shoulder and right upper quadrant. She pulled her right shoulder while getting into her son's truck 5 days ago and she was attributing the pain to it.   It is associated with nausea and decrease appetite. For the last 3 days pain is mostly in right upper quadrant and flank area  and it is getting worse so she was brought to ER. Denies fever, urinary symptoms, diarrhea or vomiting. Pt admitted HIDA scan done cholecytsitis   ruled out , US of renal with abnormalities  on the right urethral  kidney area , CT of abd chest ordered , hypotensive fluid bolus given , pt is also with renal failure on admission labs , unknown baseline cr     1- Flank pain/RUQ pain possible pyelonephritis /UTI   ID on board , wbc is worsening   d/w Dr Maguire   cont iv abx   CT scan result noted   repeat wbc     2- ARF  on CRF un known baseline   cont iv hydration , will check BMP in am   renal consult called   bladder  scan r/o retention   I and O 's     3- Cholelithiasis   HIDA scan 2 days ago no cholecystitis   monitor LFT's , lipase   if high wlll consider GI evaluation     4- Decreased oral intake / malnutrition mild   add ensure to meals , add marinol for appetite   cont dextrose with saline   will d/w family

## 2019-05-19 NOTE — DISCHARGE NOTE PROVIDER - CARE PROVIDERS DIRECT ADDRESSES
,[email or web address] ,DirectAddress_Unknown,DirectAddress_Unknown,[email or web address],[email or web address]

## 2019-05-19 NOTE — DISCHARGE NOTE PROVIDER - CARE PROVIDER_API CALL
Bernardino Muñoz)  Orthopaedic Surgery  [address] [place] [address]  Phone: [phone]  Fax: [phone]  Follow Up Time: Bernardino Muñoz (MD)  Orthopaedic Surgery  [address] [place] [address]  Phone: [phone]  Fax: [phone]  Follow Up Time:     Bernardino Maguire)  Infectious Disease; Internal Medicine  [address] [facility], [address]  [place] [address]  Phone: [phone]  Fax: [phone]  Follow Up Time:     Serg Pires)  Urology  [address] [facility]  [place] [address]  Phone: [phone]  Fax: [phone]  Follow Up Time:     Bernardino Goldman ()  Surgical ICU  [address] [place] [address]  Phone: [phone]  Fax: [phone]  Follow Up Time: Bernardino Maguire)  Infectious Disease; Internal Medicine  [address] [facility], [address]  [place] [address]  Phone: [phone]  Fax: [phone]  Follow Up Time:     Jolene Lopez)  [facility]  [address] [place] [address]  Phone: [phone]  Fax: [phone]  Follow Up Time:     Asad Smalls (DO)  Internal Medicine  [address] [facility], [place] [address]  Phone: [phone]  Fax: [phone]  Follow Up Time:     Serg Pires)  Urology  [address] [facility] [facility] [address]  [place] [address]  Phone: [phone]  Fax: [phone]  Follow Up Time:

## 2019-05-19 NOTE — DISCHARGE NOTE PROVIDER - NSDCFUADDINST_GEN_ALL_CORE_FT
Orthopedic Recommendations: The patient is WBAT of the right upper extremity. Patient will continue to wear sling for comfort and continue physical therapy. Patient is non-operative at this time. Follow up in the office 1-2 weeks after discharge. Orthopedic Recommendations: The patient is WBAT of the right upper extremity. Patient will continue to wear sling for comfort and continue physical therapy. Patient is non-operative at this time. Follow up in the office 2 weeks after discharge.

## 2019-05-19 NOTE — PROGRESS NOTE ADULT - SUBJECTIVE AND OBJECTIVE BOX
Pt is seen earlier today , pt is with poor oral intake   RUQ pain reported , comfortable no distress   She is with hard of hearing  hearing aid     Vital Signs Last 24 Hrs  T(C): 36.3 (19 May 2019 07:39), Max: 36.3 (18 May 2019 16:50)  T(F): 97.4 (19 May 2019 07:39), Max: 97.4 (18 May 2019 16:50)  HR: 58 (19 May 2019 07:39) (56 - 78)  BP: 100/69 (19 May 2019 07:39) (90/60 - 108/66)  BP(mean): --  RR: 18 (19 May 2019 07:39) (18 - 18)  SpO2: 100% (19 May 2019 07:39) (95% - 100%)        GENERAL: NAD, resting in the bed   CHEST/LUNG: Clear to auscultation diminished BS on the right lower chest    No rales, rhonchi, wheezing, or rubs  HEART: Regular rate and rhythm; No murmurs, rubs, or gallops  ABDOMEN: Soft, RUQ tenderness on palpation mild quarding Nondistended; Bowel sounds present  EXTREMITIES:   No clubbing, cyanosis, or edema  Musculoskeletal : right costo vertebral tenderness                           12.1   16.5  )-----------( 220      ( 18 May 2019 18:15 )             35.4   05-18    131<L>  |  99  |  94.0<H>  ----------------------------<  92  3.6   |  15.0<L>  |  3.14<H>    Ca    9.5      18 May 2019 18:15  Phos  4.1     05-18    TPro  5.4<L>  /  Alb  1.8<L>  /  TBili  1.1  /  DBili  x   /  AST  18  /  ALT  19  /  AlkPhos  92  05-18          Urinalysis Basic - ( 16 May 2019 17:52 )    Color: Yellow / Appearance: Clear / S.015 / pH: x  Gluc: x / Ketone: Negative  / Bili: Small / Urobili: 1 mg/dL   Blood: x / Protein: 30 mg/dL / Nitrite: Negative   Leuk Esterase: Trace / RBC: 0-2 /HPF / WBC 3-5   Sq Epi: x / Non Sq Epi: Moderate / Bacteria: Few      Culture Results:   <10,000 CFU/ml Corynebacterium species (. @ 17:53)          RADIOLOGY & ADDITIONAL TESTS:

## 2019-05-19 NOTE — DISCHARGE NOTE PROVIDER - NSDCCPCAREPLAN_GEN_ALL_CORE_FT
PRINCIPAL DISCHARGE DIAGNOSIS  Diagnosis: Cholecystitis  Assessment and Plan of Treatment: CT scan with large collection,  s/p placement of IR drain   ? colonic source such as possible microperforation, then closed.   ? biliary perforation then closed  abscess cultures sent  klebsiella, also enterococcus faecalis SS to ampicillin  CT scan without extraluminal contrast  s/p placement of a percutaneous biliary drain  Biliary drain culture with Streptococcus species  S/P choley drain 5/28/19  continue IV antibiotcs via [facility] through      SECONDARY DISCHARGE DIAGNOSES  Diagnosis: MARY (acute kidney injury)  Assessment and Plan of Treatment: Resolved  Avoid nephrotoxic drugs PRINCIPAL DISCHARGE DIAGNOSIS  Diagnosis: Cholecystitis  Assessment and Plan of Treatment: S/P cholecystostomy tube on 5/28/19  Flush drain daily.  Monitor output.  continue IV antibiotcs via [facility] through 6/12/19.  Follow up with ID in 2 weeks.  Follow up with IR in 4-6 weeks.      SECONDARY DISCHARGE DIAGNOSES  Diagnosis: MARY (acute kidney injury)  Assessment and Plan of Treatment: Resolved  Avoid nephrotoxic drugs  Follow up with Nephrology in 1 week. PRINCIPAL DISCHARGE DIAGNOSIS  Diagnosis: Cholecystitis  Assessment and Plan of Treatment: S/P cholecystostomy tube on 5/28/19  Flush drain daily with NS 10 cc's, do not aspirate  Monitor output.  continue IV antibiotcs via [facility] through 6/12/19.  Follow up with ID in 2 weeks.  Follow up with IR in 4-6 weeks.      SECONDARY DISCHARGE DIAGNOSES  Diagnosis: MARY (acute kidney injury)  Assessment and Plan of Treatment: Resolved  Avoid nephrotoxic drugs  Follow up with Nephrology in 1 week.

## 2019-05-19 NOTE — PROGRESS NOTE ADULT - ASSESSMENT
This 89 years old male with PMH of Colon Cancer s/p Resection, Hiatal Hernia, HTN and HLD brought by family with right upper quadrant and right shoulder pain    had sonographic moy sign, but negative HIDA  right sided flank pain  CT scan with large collection, possible uroma    Cultures from blood negative.   Urine with corynebacterium - skin shell, improper collection of urine specimen    - on ZOSYN but IV ACCESS ISSUES>  CHANGE TO PO LEVAQUIN    Suggest Urology evaluation of possible uroma    consider discussion with family regarding goals of care.       - follow up all outstanding cultures  - trend temperature and WBC curve  - repeat cultures from blood and all sources if febrile.

## 2019-05-19 NOTE — PROGRESS NOTE ADULT - SUBJECTIVE AND OBJECTIVE BOX
SURGICAL PA NOTE: Urology consult, CTSP by Medical Hospitalist, pt with large right perinephric fluid collection    STATUS POST:      POST OPERATIVE DAY #:     Vital Signs Last 24 Hrs  T(C): 36.6 (19 May 2019 17:05), Max: 36.6 (19 May 2019 17:05)  T(F): 97.9 (19 May 2019 17:05), Max: 97.9 (19 May 2019 17:05)  HR: 64 (19 May 2019 17:05) (58 - 76)  BP: 98/66 (19 May 2019 17:05) (96/61 - 100/69)  BP(mean): --  RR: 18 (19 May 2019 17:05) (18 - 18)  SpO2: 100% (19 May 2019 17:05) (100% - 100%)    HPI:  89 years old female with PMH of Colon Cancer s/p Resection, Hiatal Hernia, HTN and HLD brought by family with right upper quadrant and right shoulder pain. As per patient, pain started 5 days ago on back side of right chest and then radiated to right shoulder and right upper quadrant. She pulled her right shoulder while getting into her son's truck 5 days ago and she was attributing the pain to it.   It is associated with nausea and decrease appetite. For the last 3 days pain is mostly in right upper quadrant and it is getting worse so she was brought to ER.   Denies fever, urinary symptoms, diarrhea or vomiting. Denies shoulder injury in past. (16 May 2019 17:30)      Cholecystitis  Yes  No pertinent family history in first degree relatives  Handoff  MEWS Score  HLD (hyperlipidemia)  Hiatal hernia  Colon cancer  HTN (hypertension)  Cholecystitis  H/O hemicolectomy  RIGHT SHOULDER PAIN  MARY (acute kidney injury)      SUBJECTIVE: Pt seen lying supine with HOB up, nonseptic appearing, mildly tachypneic, in no acute distress, arousable but seems lethargic, hx obtained from family at bedside, nurse straight cathed pt earlier for residual of 400cc, pt voiding into diaper, no fever/chills, no constipation/diarrhea, no NV, taking small amt PO    Diet:    Activity:     Fevers: [ ]Yes [ ]NO  Chills: [ ] Yes [ ] NO  SOB:  [ ] YES [ ] NO  Dyspnea: [ ]YES [ ]NO  Chest Discomfort: [ ] YES [ ] NO    Nausea: [ ] YES [ ] NO           Vomiting: [ ] YES [ ] NO  Flatus: [ ] YES [ ] NO             Bowel Movement: [ ] YES [ ] NO  Diarrhea: [ ] YES [ ] NO         Void: [ ]YES [ ]No  Constipation: [ ] YES [ ] NO       Pain (0-10):              Pain Control Adequate: [ ] YES [ ] NO    Sarah:    SARAHT:      I&O's Detail    18 May 2019 07:01  -  19 May 2019 07:00  --------------------------------------------------------  IN:    IV PiggyBack: 250 mL    Lactated Ringers IV Bolus: 500 mL    Oral Fluid: 275 mL    sodium chloride 0.9%: 1700 mL  Total IN: 2725 mL    OUT:    Voided: 1 mL  Total OUT: 1 mL    Total NET: 2724 mL      19 May 2019 07:01  -  19 May 2019 18:01  --------------------------------------------------------  IN:    dextrose 5% + sodium chloride 0.45%: 400 mL  Total IN: 400 mL    OUT:    Intermittent Catheterization - Urethral: 450 mL  Total OUT: 450 mL    Total NET: -50 mL        I&O's Summary    18 May 2019 07:01  -  19 May 2019 07:00  --------------------------------------------------------  IN: 2725 mL / OUT: 1 mL / NET: 2724 mL    19 May 2019 07:01  -  19 May 2019 18:01  --------------------------------------------------------  IN: 400 mL / OUT: 450 mL / NET: -50 mL          MEDICATIONS  (STANDING):  acetaminophen   Tablet .. 650 milliGRAM(s) Oral every 8 hours  atorvastatin 10 milliGRAM(s) Oral at bedtime  dextrose 5% + sodium chloride 0.45% 1000 milliLiter(s) (100 mL/Hr) IV Continuous <Continuous>  fentaNYL   Patch  12 MICROgram(s)/Hr 1 Patch Transdermal every 72 hours  lactated ringers. 1000 milliLiter(s) (30 mL/Hr) IV Continuous <Continuous>  levoFLOXacin  Tablet 500 milliGRAM(s) Oral once  lidocaine   Patch 1 Patch Transdermal daily  montelukast 10 milliGRAM(s) Oral daily  pantoprazole    Tablet 40 milliGRAM(s) Oral before breakfast  saccharomyces boulardii 250 milliGRAM(s) Oral two times a day  senna 2 Tablet(s) Oral at bedtime  tamsulosin 0.4 milliGRAM(s) Oral at bedtime    MEDICATIONS  (PRN):  acetaminophen   Tablet .. 650 milliGRAM(s) Oral every 6 hours PRN Temp greater or equal to 38C (100.4F), Mild Pain (1 - 3)  HYDROmorphone  Injectable 0.5 milliGRAM(s) IV Push every 4 hours PRN Severe Pain (7 - 10)  ondansetron Injectable 4 milliGRAM(s) IV Push every 6 hours PRN Nausea and/or Vomiting  polyethylene glycol 3350 17 Gram(s) Oral daily PRN Constipation  traMADol 25 milliGRAM(s) Oral every 4 hours PRN Moderate Pain (4 - 6)      LABS:                        12.1   16.5  )-----------( 220      ( 18 May 2019 18:15 )             35.4     05-18    131<L>  |  99  |  94.0<H>  ----------------------------<  92  3.6   |  15.0<L>  |  3.14<H>    Ca    9.5      18 May 2019 18:15  Phos  4.1     05-18    TPro  5.4<L>  /  Alb  1.8<L>  /  TBili  1.1  /  DBili  x   /  AST  18  /  ALT  19  /  AlkPhos  92  05-18      ----------------------------------------------------------------------------------------------------------------------------------      RADIOLOGY & ADDITIONAL STUDIES:    EXAM:  CT CHEST                         EXAM:  CT ABDOMEN AND PELVIS                          PROCEDURE DATE:  05/18/2019          INTERPRETATION:  Clinical information: Right-sided abdominal pain and   right perinephric stranding.    Comparison: 9/16/2011 CT scan of the abdomen and pelvis    PROCEDURE:   CT of the Chest, abdomen and pelvis was performed without oral or   intravenous contrast.    FINDINGS:    CHEST:    LUNG AND LARGE AIRWAYS: There is partial atelectasis of the right lower   lobe and partial atelectasis of the left lower lobe. There is linear   scarring or atelectasis in the left upper lobe. There is partial   atelectasis involving the lingula.  PLEURA: Small bilateral pleural effusions, right larger than left.  VESSELS: Atherosclerotic changes in the aorta and coronary arteries  HEART: normal size. No pericardial effusion.  MEDIASTINUM AND GRACE: Again noted is a very large hiatal hernia   containing an intrathoracic stomach as well as fat and transverse colon.   This results in partial atelectasis involving both lower lobes.  CHEST WALL AND LOWER NECK: There is a 1.9 cm right lobe thyroid nodule.    ABDOMEN:  LIVER: within normal limits.  BILE DUCTS: There is mild intrahepatic biliary ductal dilatation and   thereare stones measuring up to 1.0 cm in the region of the common bile   duct.  GALLBLADDER: Sludge and/or stones within the gallbladder.  PANCREAS: within normal limits.  SPLEEN: within normal limits.  ADRENALS: within normal limits.  KIDNEYS: There is no hydronephrosis bilaterally. There is a subcentimeter   hyperattenuating focus which may be a hemorrhagic cyst in the left   kidney. The right kidney is displaced anteriorly and surrounded by a   large amount of fluid in the perinephric space. This fluid is simple in   appearance and extends medially to abut the IVC and second portion of the   duodenum. The etiology for this is not determined but could be related to   prior calyceal rupture of the right kidney and urinoma collection. In   addition, a retroperitoneal process in the abdomen which tracked into the   right pararenal space is also in the differential diagnosis.  This fluid   collection measures approximately 9.2 x 5.9 cm and extends for 19 cm in   craniocaudal dimension.  PELVIS:  REPRODUCTIVE ORGANS: Bulky appearing uterus.  URETERS: within normal limits.  BLADDER: Distended.      BOWEL: Colonic diverticulosis. Partial right colon resection. No bowel   obstruction.  PERITONEUM: no ascites or free air, no fluid collection.  VESSELS: Atherosclerotic changes  RETROPERITONEUM: No enlarged retroperitoneal or pelvic nodes.  ABDOMINAL WALL: within normal limits.  BONES: within normal limits.    IMPRESSION:     Interval development of partial atelectasis involving both lower lobes.   Again noted is a very large hiatal hernia with an intrathoracic stomach   and herniation of mesenteric fat and transverse colon.    Gallstones and gallbladder sludge. Interval development of common bile   duct stones and mild intrahepatic biliary ductal dilatation.    Interval development of a very large right perinephric fluid collection   of indeterminant etiology. The fluid appears simple and could represent   the sequela of prior right renal calyceal rupture (urinoma) or represent   fluid that has collected from a retroperitoneal process in the upper   abdomen.    Bulky appearing uterus. Further evaluation with pelvic sonography is   recommended to evaluate for myomas when feasible.    1.9 right lobe thyroid nodule.     VRAD-Addendumcreated by Karlos Sims MD on 5/19/2019 12:26:54 AM EDT   Findings discussed with Vannessa YEBOAH at time of interpretation.  Initial report created on 5/18/2019 11:31:10 PM EDT    ----------------------------------------------------------------------------------------------------------------------------------------------     EXAM:  US KIDNEY(S)                          PROCEDURE DATE:  05/17/2019          INTERPRETATION:  CLINICAL HISTORY: Abnormal urinalysis.   Assess for urinary tract infection hydronephrosis.  TECHNIQUE: Sonogram of the kidneys  COMPARISON: None    FINDINGS: The right kidney is normal in echogenicity and size, measuring   9.4 cm in longitudinal dimension. No right hydronephrosis, mass or   calculi is visualized. Upper pole cyst measures 3.0 x 2.4 x 2.5 cm.  I there is a complex a heterogeneous cystic collection surrounding the   RIGHT kidney extending into the RIGHT lower quadrant. This may represent   perinephric stranding. Follow-up CT scan recommended.        The left kidney is normal in echogenicity and size, measuring 10.0 cm in   longitudinal dimension. No left hydronephrosis, mass or calculi is   visualized. Kyphosis measures 0.7 x 0.5 x 0.6 cm.    The bladder is unremarkable in the distended state.  Bilateral ureteral   jets demonstrate ureteral patency.  No bladder mass or calculus is   recognized.  No wall thickening is recognized.      IMPRESSION:  Heterogeneous collection surrounding the inferior RIGHT kidney extending   to the RIGHT lower quadrant of indeterminate etiology. Follow-up CT scan   of the abdomen with contrast recommended.    ---------------------------------------------------------------------------------------------------------------------------------------------------------------------------------------------     EXAM:  US GALLBLADDER                          *** ADDENDUM 05/17/2019  ***    Correction:  Findings: Gallbladder wall measures 3.2 mm in thickness.      *** END OF ADDENDUM 05/17/2019  ***      PROCEDURE DATE:  05/16/2019          INTERPRETATION: Right upper quadrant gallbladder and common bile duct   ultrasound.    Comparison: None.    Clinical Indication: Right upper quadrant pain.. Assess for   cholelithiasis or acute cholecystitis.    FINDINGS:     The gallbladder  is filled with gallstones causing acoustic shadowing.   Gallstone seen within the gallbladder neck. Gallbladder wall is thickened   measuring 3.2 mm.,      The common duct measures 7.0 mm in maximum diameter.  There is mild intrahepatic biliary duct dilatation.   Pain elicited during compression with transducer consistent with   positive Pierce's sign.    .      IMPRESSION:     CHOLELITHIASIS. POSITIVE PIERCE SPINE. MILDLY DILATED COMMON BILE DUCT   MEASURING 7 MM.     FINDINGS  CONCERNING FOR ACUTE CHOLECYSTITIS..      ***Please see the addendum at the top of this report. It may contain   additional important information or changes.****          JOANA PIZARRO M.D., ATTENDING RADIOLOGIST  This document has been electronically signed. May 16 2019  1:43PM  Addend: JOANA PIZARRO M.D., ATTENDING RADIOLOGIST  This addendum was electronically signed on: May 17 2019  2:03PM.    ---------------------------------------------------------------------------------------------------------------------------------------------     EXAM:  NM HEPATOBILIARY IMG                          PROCEDURE DATE:  05/17/2019          INTERPRETATION:  RADIOPHARMACEUTICAL: 3.1 mCi Tc-99m-Mebrofenin, I.V.    CLINICAL INFORMATION: 89 year old female with right upper quadrant   abdominal pain and cholelithiasis referred to evaluate for acute   cholecystitis    TECHNIQUE: Dynamic imaging of the anterior abdomen was performed for 2   hours following injection of radiotracer. Static images of the abdomen in   the anterior, right lateral and right anterior oblique views were   obtained immediately thereafter.    COMPARISON: No previous hepatobiliary scan  for comparison    FINDINGS: There is prompt, homogeneous uptake of radiotracer by the   hepatocytes. Activity is first seen in the bowelat 60 minutes and   gallbladder starting at 75 minutes. There is good clearance of activity   from the liver by the end of the study.    IMPRESSION: Normal hepatobiliary scan.    No scan evidence of acute cholecystitis.

## 2019-05-19 NOTE — PROGRESS NOTE ADULT - SUBJECTIVE AND OBJECTIVE BOX
[facility]  INFECTIOUS DISEASES AND INTERNAL MEDICINE at [place]  =======================================================  Elio James MD [facility]   Sanjay Rendon MD  Diplomates American Board of Internal Medicine and Infectious Diseases  Telephone [phone]  Fax            [phone]  =======================================================    [id number]  BETY TRINIDAD   follow up for:  fevers, right lower abd pain  patient seen and examined.     CT scan reviewed with patient's next of kin  hiatal hernia with stomach and colon in chest  large collection pushing right kidney forward, possible uroma.  gallbladder stones     ===================================================  REVIEW OF SYSTEMS:  as above  all other ROS negative    =======================================================  Allergies  No Known Allergies    Antibiotics:  piperacillin/tazobactam IVPB. 3.375 Gram(s) IV Intermittent every 12 hours    ======================================================  Physical Exam:  ============  T(F): 97.4 (19 May 2019 07:39), Max: 97.4 (18 May 2019 16:50)  HR: 58 (19 May 2019 07:39)  BP: 100/69 (19 May 2019 07:39)  RR: 18 (19 May 2019 07:39)  SpO2: 100% (19 May 2019 07:39) (98% - 100%)  temp max in last 48H T(F): , Max: 98.1 (05-18-19 @ 00:39)    General:  No acute distress. FRAIL  Sleeping  Eye: Pupils are equal, round and reactive to light, Extraocular movements are intact, Normal conjunctiva.  HENT: Normocephalic, Oral mucosa is DRY, EDENTULOUS  Neck: Supple, No lymphadenopathy.  Respiratory: Lungs are clear to auscultation, Respirations are non-labored.  Cardiovascular: Normal rate, Regular rhythm,   Gastrointestinal: Soft,  ACTIVE BOWEL SOUNDS, decreased tenderness to palpation of RIGHT lower abd  Genitourinary: No costovertebral angle tenderness.  Lymphatics: No lymphadenopathy neck,   Musculoskeletal: Normal range of motion, Normal strength.  Integumentary: No rash.  Neurologic: Alert, Oriented, No focal deficits, Cranial Nerves II-XII are grossly intact.  Psychiatric: Appropriate mood & affect.      =======================================================  Labs:                        12.1   16.5  )-----------( 220      ( 18 May 2019 18:15 )             35.4       WBC Count: 16.5 K/uL (05-18-19 @ 18:15)  WBC Count: 10.0 K/uL (05-16-19 @ 13:19)      05-18    131<L>  |  99  |  94.0<H>  ----------------------------<  92  3.6   |  15.0<L>  |  3.14<H>    Ca    9.5      18 May 2019 18:15  Phos  4.1     05-18    TPro  5.4<L>  /  Alb  1.8<L>  /  TBili  1.1  /  DBili  x   /  AST  18  /  ALT  19  /  AlkPhos  92  05-18      Culture - Blood (collected 05-16-19 @ 20:37)  Source: .Blood    Culture - Blood (collected 05-16-19 @ 20:37)  Source: .Blood    Culture - Urine (collected 05-16-19 @ 17:53)  Source: .Urine  Final Report (05-17-19 @ 17:09):    <10,000 CFU/ml Corynebacterium species

## 2019-05-19 NOTE — CHART NOTE - NSCHARTNOTEFT_GEN_A_CORE
Called by virtual radiology. Pt had CT chest/ abd/pelvis. + choledocholithiasis and multiple other findings. Discussed with Dr Nava. ERCP recommended. Will sign off in am to hospitalist

## 2019-05-19 NOTE — CONSULT NOTE ADULT - ASSESSMENT
MARY rising Cr  Low BP's renal hypoperfusion  Leukocytosis dysuria UTI possible pyelo  MA   Will adjust IVF add bicarb cont   Renal sono noted no hydronephrosis  Collection surrounding lower R kidney s/p CT Abd/ pelvis =>+ choledocholithiasis   RP fluid pocket possible abscess? vs resolving hematoma or urinoma.   Would need IV Con to assess don't recommend w rising Cr     Will follow MARY rising Cr  Low BP's renal hypoperfusion  Leukocytosis dysuria UTI possible pyelo  MA   Will adjust IVF add bicarb cont   Renal sono noted no hydronephrosis  Collection surrounding lower R kidney s/p CT Abd/ pelvis =>+ choledocholithiasis   RP fluid pocket possible abscess? vs resolving hematoma or urinoma.   Would need IV Con to assess don't recommend w rising Cr   Consider changing zosyn will d/w ID  Blood cx NG 48hr    Will follow

## 2019-05-19 NOTE — DISCHARGE NOTE PROVIDER - PROVIDER TOKENS
PROVIDER:[TOKEN:[[id number]]] PROVIDER:[TOKEN:[[id number]]],PROVIDER:[TOKEN:[[id number]]],PROVIDER:[TOKEN:[[id number]]],PROVIDER:[TOKEN:[[id number]]] PROVIDER:[TOKEN:[[id number]]],PROVIDER:[TOKEN:[[id number]]],PROVIDER:[TOKEN:[[id number]]],PROVIDER:[TOKEN:[[id number]]]

## 2019-05-19 NOTE — GOALS OF CARE CONVERSATION - PERSONAL ADVANCE DIRECTIVE - CONVERSATION DETAILS
discussed all findings , pt's condition and current therapy   pt is with advanced age , poor oral intake renal failure , HTN and abnormal CT sacn of kidney   overall prognosis poor   daughter had decided to sign MOLTS form , helped her   she wants her mom to go to [facility] if improves, may consider comfort measures or hospice if does not improve  DNR /DNI   cont current medical therapy

## 2019-05-19 NOTE — DISCHARGE NOTE PROVIDER - NSDCFUADDAPPT_GEN_ALL_CORE_FT
Follow up with PMD in 1 week.  Follow up with IR in 1 week for SEAN Drain (Right Perinephric Collection).  Follow up with IR in 4-6 weeks for SEAN Drain (Acute Cholecystitis).  Monitor drain output.  Follow up with ID in 2 weeks.   Follow up with Urology in 2 weeks.  Follow up with Ortho - Dr. Muñoz in 2 weeks. Follow up with PMD in 1 week.  Follow up with IR in 1 week for SEAN Drain (Right Perinephric Collection).  Follow up with IR in 4-6 weeks for SEAN Drain (Acute Cholecystitis).  Monitor drain output.  Follow up with ID in 2 weeks.   Follow up with Nephrology in 1-2 weeks.   Follow up with Urology in 2 weeks.  Follow up with [facility] - Dr. Muñoz in 2 weeks.

## 2019-05-19 NOTE — DISCHARGE NOTE PROVIDER - HOSPITAL COURSE
89 years old male with PMH of Colon Cancer s/p Resection, Hiatal Hernia, HTN and HLD brought by family with right upper quadrant and right shoulder pain,  associated with nausea and decrease appetite. For 3 days, pain was mostly in right upper quadrant and flank area and worsened so she was brought to ER.  Pt admitted. HIDA scan negative for cholecystitis, US of renal with abnormalities on the right urethral kidney area , CT with possible perinephric collection/urinoma.  Urology consulted and recommended IR drainage of fluid collection.  Patient s/p IR drainage 5/20/19 with thick green drainage. CT with Po contrast does not indicate intestinal leak. ID consulted, fluid cx with Klebsiella Pneumoniae and Enterococcus Faecalis. Initially treated with IV Invanz was switched to Unasyn by ID, leukocytosis; resolved    CT scan with po contrast negative for GI leak    Creatinine level of body fluid (called lab and asked to add the test)    Urology input appreciated: does not look like Urinoma    No intervention as per surgery    Discussed with Radiology - Upper GI Series won't help as patient had CT with Oral Contrast and it did not show any GI leak.     2) Acute Cholecystitis    - Initial HIDA scan on 5/17/19 did not show cholecystitis     - Patient is already on antibiotics    - Tolerating PO    - Pain in RUQ has improved    - Discussed with IR; Cholecystostomy Tube today    - Continue NPO    3) MARY    s/p iv hydration    improved    avoid nephrotoxic medications    Renal input appreciated    4) Hypercalcemia    etiology unclear, likely immobilization     avoid Ca+ supplements and Vit D    may need to consider treating with Aredia if upward trend continues as per Renal    5) Severe Protein Calorie Malnutrition    cont ensure with meals and MVI    nutritional recs appreciated     6) Rotator Cuff Tear    Pain control    Right arm sling    Ortho Consult appreciated    7) HTN    Hold Enalapril and HCTZ for now    8) HLD    Continue Statins    9) GERD    Protonix 40 mg    10) Hypomagnesemia    Replaced        DVT Prophylaxis -- Heparin 5000 Units        DNR/DNI. 89 years old male with PMH of Colon Cancer s/p Resection, Hiatal Hernia, HTN and HLD brought by family with right upper quadrant and right shoulder pain,  associated with nausea and decrease appetite. For 3 days, pain was mostly in right upper quadrant and flank area and worsened so she was brought to ER.  Pt admitted. HIDA scan negative for cholecystitis, US of renal with abnormalities on the right urethral kidney area , CT with possible perinephric collection/urinoma.  Urology consulted and recommended IR drainage of fluid collection.  Patient s/p IR drainage 5/20/19 with thick green drainage. CT with Po contrast does not indicate intestinal leak. ID consulted, fluid cx with Klebsiella Pneumoniae and Enterococcus Faecalis. Initially treated with IV Invanz was switched to Unasyn by ID, leukocytosis; resolved. CT scan with po contrast negative for GI leak. Initial HIDA scan on 5/17/19 did not show cholecystitis. Repeat HID scan on 5/26 compatible with acute cholecystitis. S/P Cholecystostomy Tube placement by IR on 5/29/19. Diet advanced. Ortho consulted for right shoulder pain, right rotator cuff tear. Right arm sling and pain control recommended. Patient is DNR/DNI. Seen by PT and recommend KAUSHIK. 89 years old male with PMH of Colon Cancer s/p Resection, Hiatal Hernia, HTN and HLD brought by family with right upper quadrant and right shoulder pain,  associated with nausea and decrease appetite. For 3 days, pain was mostly in right upper quadrant and flank area and worsened so she was brought to ER.  Pt admitted. HIDA scan negative for cholecystitis, US of renal with abnormalities on the right urethral kidney area , CT with possible perinephric collection/urinoma.  Urology consulted and recommended IR drainage of fluid collection.  Patient s/p IR drainage 5/20/19 with thick green drainage. CT with Po contrast does not indicate intestinal leak. ID consulted, fluid cx with Klebsiella Pneumoniae and Enterococcus Faecalis. Started on IV antibiotics, leukocytosis resolved. CT scan with po contrast negative for GI leak. Initial HIDA scan on 5/17/19 did not show cholecystitis. Repeat HID scan on 5/26 compatible with acute cholecystitis. S/P Cholecystostomy Tube placement by IR on 5/29/19. Diet advanced. Ortho consulted for right shoulder pain, right rotator cuff tear. Right arm sling and pain control recommended. Patient is DNR/DNI. Seen by PT and recommend KAUSHIK.         Time spent: 46 minutes

## 2019-05-19 NOTE — CONSULT NOTE ADULT - SUBJECTIVE AND OBJECTIVE BOX
HPI:  89 years old male with PMH of Colon Cancer s/p Resection, Hiatal Hernia, HTN and HLD brought by family with right upper quadrant and right shoulder pain. As per patient, pain started 5 days ago on back side of right chest and then radiated to right shoulder and right upper quadrant. She pulled her right shoulder while getting into her son's truck 5 days ago and she was attributing the pain to it.   It is associated with nausea and decrease appetite. For the last 3 days pain is mostly in right upper quadrant and it is getting worse so she was brought to ER.   Denies fever, urinary symptoms, diarrhea or vomiting. Denies shoulder injury in past.  Has rising Cr  ROS above    PAST MEDICAL & SURGICAL HISTORY:  HLD (hyperlipidemia)  Hiatal hernia  Colon cancer  HTN (hypertension)  H/O hemicolectomy      FAMILY HISTORY:  No pertinent family history in first degree relatives  [place]    Social History:Non smoker    MEDICATIONS  (STANDING):  acetaminophen   Tablet .. 650 milliGRAM(s) Oral every 8 hours  aspirin  chewable 81 milliGRAM(s) Oral daily  atorvastatin 10 milliGRAM(s) Oral at bedtime  dextrose 5% + sodium chloride 0.45% 1000 milliLiter(s) (100 mL/Hr) IV Continuous <Continuous>  heparin  Injectable 5000 Unit(s) SubCutaneous every 12 hours  lidocaine   Patch 1 Patch Transdermal daily  montelukast 10 milliGRAM(s) Oral daily  pantoprazole    Tablet 40 milliGRAM(s) Oral before breakfast  piperacillin/tazobactam IVPB. 3.375 Gram(s) IV Intermittent every 12 hours  saccharomyces boulardii 250 milliGRAM(s) Oral two times a day  senna 2 Tablet(s) Oral at bedtime    MEDICATIONS  (PRN):  acetaminophen   Tablet .. 650 milliGRAM(s) Oral every 6 hours PRN Temp greater or equal to 38C (100.4F), Mild Pain (1 - 3)  ondansetron Injectable 4 milliGRAM(s) IV Push every 6 hours PRN Nausea and/or Vomiting  polyethylene glycol 3350 17 Gram(s) Oral daily PRN Constipation   Meds reviewed      Vital Signs Last 24 Hrs  T(C): 36.3 (19 May 2019 07:39), Max: 36.3 (18 May 2019 16:50)  T(F): 97.4 (19 May 2019 07:39), Max: 97.4 (18 May 2019 16:50)  HR: 58 (19 May 2019 07:39) (56 - 78)  BP: 100/69 (19 May 2019 07:39) (90/60 - 108/66)  BP(mean): --  RR: 18 (19 May 2019 07:39) (18 - 18)  SpO2: 100% (19 May 2019 07:39) (95% - 100%)  Daily     Daily     PHYSICAL EXAM:    GENERAL: appears chronically ill, frail  HEAD:  Atraumatic, Normocephalic  EYES: EOMI  NECK: Supple, neck  veins full  NERVOUS SYSTEM:  Alert & Oriented X3  CHEST/LUNG: Clear to percussion bilaterally; No rales  HEART: Regular rate and rhythm; No murmur  ABDOMEN: Soft, Nontender, Nondistended; Bowel sounds present  EXTREMITIES:  No edema      LABS:                        12.1   16.5  )-----------( 220      ( 18 May 2019 18:15 )             35.4     05-18    131<L>  |  99  |  94.0<H>  ----------------------------<  92  3.6   |  15.0<L>  |  3.14<H>    Ca    9.5      18 May 2019 18:15  Phos  4.1     05-18    TPro  5.4<L>  /  Alb  1.8<L>  /  TBili  1.1  /  DBili  x   /  AST  18  /  ALT  19  /  AlkPhos  92  05-18        Phosphorus Level, Serum: 4.1 mg/dL (05-18 @ 18:15)          RADIOLOGY & ADDITIONAL TESTS:

## 2019-05-20 NOTE — PROGRESS NOTE ADULT - SUBJECTIVE AND OBJECTIVE BOX
Pt is seen in am , awake alert sitting in the bed , pain is much better , no complaints   still with poor appetite   urology input appreciated          Vital Signs Last 24 Hrs  T(C): 36.3 (20 May 2019 07:23), Max: 36.6 (19 May 2019 17:05)  T(F): 97.4 (20 May 2019 07:23), Max: 97.9 (19 May 2019 17:05)  HR: 81 (20 May 2019 07:23) (64 - 81)  BP: 104/72 (20 May 2019 07:23) (95/60 - 104/72)  BP(mean): --  RR: 18 (20 May 2019 07:23) (18 - 19)  SpO2: 94% (20 May 2019 07:23) (93% - 100%)    GENERAL: NAD, resting in the bed   CHEST/LUNG: Clear to auscultation diminished BS on the right lower lung   HEART: Regular rate and rhythm; S1/S2 No murmurs, rubs  ABDOMEN: Soft, no tenderness RUQ tenderness ,  Nondistended; Bowel sounds present  EXTREMITIES:   No clubbing, cyanosis, or edema  Musculoskeletal : right costo vertebral tenderness                             12.1   16.5  )-----------( 220      ( 18 May 2019 18:15 )             35.4   05-18    131<L>  |  99  |  94.0<H>  ----------------------------<  92  3.6   |  15.0<L>  |  3.14<H>    Ca    9.5      18 May 2019 18:15  Phos  4.1     05-18    TPro  5.4<L>  /  Alb  1.8<L>  /  TBili  1.1  /  DBili  x   /  AST  18  /  ALT  19  /  AlkPhos  92  05-18       Culture Results:   <10,000 CFU/ml Corynebacterium species (05.16.19 @ 17:53)          RADIOLOGY & ADDITIONAL TESTS:

## 2019-05-20 NOTE — PROGRESS NOTE ADULT - SUBJECTIVE AND OBJECTIVE BOX
NEPHROLOGY INTERVAL HPI/OVERNIGHT EVENTS:    Examined   frail     MEDICATIONS  (STANDING):  atorvastatin 10 milliGRAM(s) Oral at bedtime  dextrose 5% + sodium chloride 0.45% 1000 milliLiter(s) (100 mL/Hr) IV Continuous <Continuous>  ertapenem  IVPB      ertapenem  IVPB 500 milliGRAM(s) IV Intermittent once  fentaNYL   Patch  12 MICROgram(s)/Hr 1 Patch Transdermal every 72 hours  lidocaine   Patch 1 Patch Transdermal daily  montelukast 10 milliGRAM(s) Oral daily  pantoprazole    Tablet 40 milliGRAM(s) Oral before breakfast  saccharomyces boulardii 250 milliGRAM(s) Oral two times a day  senna 2 Tablet(s) Oral at bedtime  tamsulosin 0.4 milliGRAM(s) Oral at bedtime    MEDICATIONS  (PRN):  acetaminophen   Tablet .. 650 milliGRAM(s) Oral every 6 hours PRN Temp greater or equal to 38C (100.4F), Mild Pain (1 - 3)  HYDROmorphone  Injectable 0.5 milliGRAM(s) IV Push every 4 hours PRN Severe Pain (7 - 10)  ondansetron Injectable 4 milliGRAM(s) IV Push every 6 hours PRN Nausea and/or Vomiting  polyethylene glycol 3350 17 Gram(s) Oral daily PRN Constipation  traMADol 25 milliGRAM(s) Oral every 4 hours PRN Moderate Pain (4 - 6)      Allergies    No Known Allergies    Intolerances        Vital Signs Last 24 Hrs  T(C): 36.3 (20 May 2019 07:23), Max: 36.6 (19 May 2019 17:05)  T(F): 97.4 (20 May 2019 07:23), Max: 97.9 (19 May 2019 17:05)  HR: 81 (20 May 2019 07:23) (64 - 81)  BP: 104/72 (20 May 2019 07:23) (95/60 - 104/72)  BP(mean): --  RR: 18 (20 May 2019 07:23) (18 - 19)  SpO2: 94% (20 May 2019 07:23) (93% - 100%)  Daily     Daily     PHYSICAL EXAM:  GENERAL: appears chronically ill, frail  HEAD:  Atraumatic, Normocephalic  EYES: EOMI  NECK: Supple, neck  veins full  NERVOUS SYSTEM:  Alert & Oriented X3  CHEST/LUNG: Clear to percussion bilaterally; No rales  HEART: Regular rate and rhythm; No murmur  ABDOMEN: Soft, Nontender, Nondistended; Bowel sounds present  EXTREMITIES:  No edema    LABS:                        11.6   14.6  )-----------( 188      ( 20 May 2019 10:37 )             34.2     05-20    135  |  107  |  83.0<H>  ----------------------------<  95  3.8   |  17.0<L>  |  2.70<H>    Ca    9.9      20 May 2019 10:37  Phos  4.1     05-18    TPro  5.4<L>  /  Alb  1.8<L>  /  TBili  1.1  /  DBili  x   /  AST  18  /  ALT  19  /  AlkPhos  92  05-18                RADIOLOGY & ADDITIONAL TESTS:

## 2019-05-20 NOTE — PROGRESS NOTE ADULT - ASSESSMENT
MARY improved today cr 2.7  Low BP's renal hypoperfusion  Leukocytosis dysuria UTI possible pyelo  MA   cont IVF IVF add bicarb cont   Renal sono noted no hydronephrosis  Collection surrounding lower R kidney s/p CT Abd/ pelvis =>+ choledocholithiasis   RP fluid pocket possible abscess? vs resolving hematoma or urinoma.   Would need IV Con to assess don't recommend w rising Cr   noted zosyn changed to ertapenem  Blood cx NG 48hr    Will follow

## 2019-05-20 NOTE — PROCEDURE NOTE - NSPOSTCAREGUIDE_GEN_A_CORE
Keep the cast/splint/dressing clean and dry/Instructed patient/caregiver to follow-up with primary care physician/Care for catheter as per unit/ICU protocols/Verbal/written post procedure instructions were given to patient/caregiver/Instructed patient/caregiver regarding signs and symptoms of infection

## 2019-05-20 NOTE — PROGRESS NOTE ADULT - ASSESSMENT
89 years old male with PMH of Colon Cancer s/p Resection, Hiatal Hernia, HTN and HLD brought by family with right upper quadrant and right shoulder pain. As per patient, pain started 5 days ago on back side of right chest and then radiated to right shoulder and right upper quadrant. She pulled her right shoulder while getting into her son's truck 5 days ago and she was attributing the pain to it.   It is associated with nausea and decrease appetite. For the last 3 days pain is mostly in right upper quadrant and flank area  and it is getting worse so she was brought to ER. Denies fever, urinary symptoms, diarrhea or vomiting. Pt admitted HIDA scan done cholecytsitis   ruled out , US of renal with abnormalities  on the right urethral  kidney area , CT of abd chest ordered , hypotensive fluid bolus given , pt is also with renal failure on admission labs , unknown baseline cr     1- Flank pain/RUQ pain improving   CT scan with possible perinephric  collection/ urinoma    seen by urology for possible IR drainage   NPO , cont abx   trend leukocytosis   rose inserted by urology as indicated     2- ARF  on CRF un known baseline   cont iv hydration ,  BMP today pending   will call PCP to obtain baseline cr   I and O 's   3- Malnutrition moderate - severe protein anjali   cont ensure with meals , nutrional consult  may add marinol for appetite   4- Cholelithiasis   HIDA scan 2 days ago no cholecystitis   asymptomatic , check LFT's and lipase     DNR/DNI   DC time unsure   may need KAUSHIK /NH

## 2019-05-20 NOTE — PROCEDURE NOTE - NSPROCDETAILS_GEN_ALL_CORE
supine position/ultrasound assessment/sterile dressing applied/ultrasound guidance/location identified, draped/prepped, sterile technique used/Trendelenburg position

## 2019-05-20 NOTE — PROGRESS NOTE ADULT - SUBJECTIVE AND OBJECTIVE BOX
[facility]  INFECTIOUS DISEASES AND INTERNAL MEDICINE at [place]  =======================================================  Elio James MD [facility] Rendon MD  Diplomates American Board of Internal Medicine and Infectious Diseases  Telephone [phone]  Fax            [phone]  =======================================================    [id number]  BETY TRINIDAD   follow up for:  fevers, right lower abd pain  patient seen and examined.     still with some abd pain. but better.   ===================================================  REVIEW OF SYSTEMS:  as above  all other ROS negative    =======================================================  Allergies  No Known Allergies    Antibiotics:  levoFLOXacin  Tablet 250 milliGRAM(s) Oral every 24 hours    ======================================================  Physical Exam:  ============  T(F): 97.4 (20 May 2019 07:23), Max: 97.9 (19 May 2019 17:05)  HR: 81 (20 May 2019 07:23)  BP: 104/72 (20 May 2019 07:23)  RR: 18 (20 May 2019 07:23)  SpO2: 94% (20 May 2019 07:23) (93% - 100%)  temp max in last 48H T(F): , Max: 97.9 (05-19-19 @ 17:05)    General:  No acute distress. FRAIL  Sleeping  Eye: Pupils are equal, round and reactive to light, Extraocular movements are intact, Normal conjunctiva.  HENT: Normocephalic, Oral mucosa is DRY, EDENTULOUS  Neck: Supple, No lymphadenopathy.  Respiratory: Lungs are clear to auscultation, Respirations are non-labored.  Cardiovascular: Normal rate, Regular rhythm,   Gastrointestinal: Soft,  ACTIVE BOWEL SOUNDS, mild tenderness to palpation of RIGHT lower abd  Genitourinary: No costovertebral angle tenderness.  Lymphatics: No lymphadenopathy neck,   Musculoskeletal: Normal range of motion, Normal strength.  Integumentary: No rash.  Neurologic: Alert, Oriented, No focal deficits, Cranial Nerves II-XII are grossly intact.  Psychiatric: Appropriate mood & affect.      =======================================================  Labs:                        11.6   14.6  )-----------( 188      ( 20 May 2019 10:37 )             34.2       WBC Count: 14.6 K/uL (05-20-19 @ 10:37)  WBC Count: 16.5 K/uL (05-18-19 @ 18:15)  WBC Count: 10.0 K/uL (05-16-19 @ 13:19)      05-20    135  |  107  |  83.0<H>  ----------------------------<  95  3.8   |  17.0<L>  |  2.70<H>    Creatinine, Serum: 2.70 mg/dL (05-20-19 @ 10:37)  Creatinine, Serum: 3.14 mg/dL (05-18-19 @ 18:15)  Creatinine, Serum: 2.73 mg/dL (05-16-19 @ 13:19)    Ca    9.9      20 May 2019 10:37  Phos  4.1     05-18    TPro  5.4<L>  /  Alb  1.8<L>  /  TBili  1.1  /  DBili  x   /  AST  18  /  ALT  19  /  AlkPhos  92  05-18      Culture - Blood (collected 05-16-19 @ 20:37)  Source: .Blood    Culture - Blood (collected 05-16-19 @ 20:37)  Source: .Blood    Culture - Urine (collected 05-16-19 @ 17:53)  Source: .Urine  Final Report (05-17-19 @ 17:09):    <10,000 CFU/ml Corynebacterium species

## 2019-05-20 NOTE — PROCEDURE NOTE - NSICDXPROCEDURE_GEN_ALL_CORE_FT
PROCEDURES:  US guided needle placement 20-May-2019 16:05:53 Aaron Miguel  US guided vascular access 20-May-2019 16:05:46 Patent left brachial vein Aaron Ramírez  Midline catheter insertion 20-May-2019 16:05:32 4FR 12CM length 23CIRC BARD POWER MIDLINE ns flush good heme back left brachial vein Aaron Ramírez

## 2019-05-20 NOTE — PROGRESS NOTE ADULT - SUBJECTIVE AND OBJECTIVE BOX
Vascular & Interventional Radiology Pre-Procedure Note    Procedure Name:  right retroperitoneal fluid collection drain placement     HPI: 89y Female with right retroperitoneal fluid collection, urinary retention, up trending wbc, worsening renal function     Allergies:   Medications (Abx/Cardiac/Anticoagulation/Blood Products)  aspirin  chewable: 81 milliGRAM(s) Oral (05-19 @ 12:28)  heparin  Injectable: 5000 Unit(s) SubCutaneous (05-19 @ 05:15)  levoFLOXacin  Tablet: 500 milliGRAM(s) Oral (05-19 @ 18:05)  piperacillin/tazobactam IVPB.: 25 mL/Hr IV Intermittent (05-19 @ 02:46)  tamsulosin: 0.4 milliGRAM(s) Oral (05-19 @ 21:25)    Data:    T(C): 36.3  HR: 81  BP: 104/72  RR: 18  SpO2: 94%    05-20    135  |  107  |  83.0<H>  ----------------------------<  95  3.8   |  17.0<L>  |  2.70<H>    Ca    9.9      20 May 2019 10:37  Phos  4.1     05-18    TPro  5.4<L>  /  Alb  1.8<L>  /  TBili  1.1  /  DBili  x   /  AST  18  /  ALT  19  /  AlkPhos  92  05-18                            11.6   14.6  )-----------( 188      ( 20 May 2019 10:37 )             34.2     Imaging: ct shows right retroperitoneal fluid collection     Plan:   -89y Female presents for right retroperitoneal fluid collection drain placement   -Risks/Benefits/alternatives explained with the patient and/or healthcare proxy and witnessed informed consent obtained.

## 2019-05-20 NOTE — PROGRESS NOTE ADULT - SUBJECTIVE AND OBJECTIVE BOX
Vascular & Interventional Radiology Post-Procedure Note    Pre-Procedure Diagnosis: right retroperitoneal fluid collection  Post-Procedure Diagnosis: Same as pre.  Indications for Procedure: r/o infection    : sindy  Assistant(s): ____    Procedure Details/Findings: 10 F drain placed into right retroperitoneal fluid collection.  20 cc thick green fluid aspirated.  catheter secured and placed to SEAN.  cx, bilirubin and creatinine sent.      Access (if applicable): ____    Complications: 0  Estimated Blood Loss: Minimal  Anethesia: 1% Lidocane SQ  Specimen: as above  Contrast: 0  Sedation: mod sedation   Patient Condition/Disposition: Stable    Plan:     f/u lab results  discussed with dr yadav

## 2019-05-20 NOTE — PROGRESS NOTE ADULT - ASSESSMENT
89 yo female with urinary retention, right retroperitoneal collection, poss urinoma, poss ruptured calyx  - cont rose catheter  - IR for collection drainage today- I spoke with Dr. Lopez  - f/u labs

## 2019-05-20 NOTE — PROGRESS NOTE ADULT - ASSESSMENT
This 89 years old male with PMH of Colon Cancer s/p Resection, Hiatal Hernia, HTN and HLD brought by family with right upper quadrant and right shoulder pain    had sonographic moy sign, but negative HIDA  right sided flank pain  CT scan with large collection, possible urinoma    Cultures from blood negative.   Urine with corynebacterium - skin shell, improper collection of urine specimen  lost IV access,  no clear need for Zosyn, hence stopped.   - Continue PO levaqun    possible IR drainage of urinoma per urology note.     consider discussion with family regarding goals of care.       - follow up all outstanding cultures  - trend temperature and WBC curve  - repeat cultures from blood and all sources if febrile.

## 2019-05-20 NOTE — PROGRESS NOTE ADULT - SUBJECTIVE AND OBJECTIVE BOX
Subjective:89yFemale with urinary retention, right flank pain.  Right retroperitoneal collection seen on CT, poss ruptured calyx.  Pt states her pain is better than when she came into the ED. no n/v.    Smith: yellow urine    Vital Signs Last 24 Hrs  T(C): 36.3 (20 May 2019 07:23), Max: 36.6 (19 May 2019 17:05)  T(F): 97.4 (20 May 2019 07:23), Max: 97.9 (19 May 2019 17:05)  HR: 81 (20 May 2019 07:23) (64 - 81)  BP: 104/72 (20 May 2019 07:23) (95/60 - 104/72)  BP(mean): --  RR: 18 (20 May 2019 07:23) (18 - 19)  SpO2: 94% (20 May 2019 07:23) (93% - 100%)  I&O's Detail    19 May 2019 07:01  -  20 May 2019 07:00  --------------------------------------------------------  IN:    dextrose 5% + sodium chloride 0.45%: 400 mL    lactated ringers.: 240 mL  Total IN: 640 mL    OUT:    Indwelling Catheter - Urethral: 350 mL    Intermittent Catheterization - Urethral: 800 mL  Total OUT: 1150 mL    Total NET: -510 mL          Labs:                        11.6   14.6  )-----------( 188      ( 20 May 2019 10:37 )             34.2     05-18    131<L>  |  99  |  94.0<H>  ----------------------------<  92  3.6   |  15.0<L>  |  3.14<H>    Ca    9.5      18 May 2019 18:15  Phos  4.1     05-18    TPro  5.4<L>  /  Alb  1.8<L>  /  TBili  1.1  /  DBili  x   /  AST  18  /  ALT  19  /  AlkPhos  92  05-18

## 2019-05-21 NOTE — PROGRESS NOTE ADULT - ASSESSMENT
This 89 years old male with PMH of Colon Cancer s/p Resection, Hiatal Hernia, HTN and HLD brought by family with right upper quadrant and right shoulder pain    had sonographic moy sign, but negative HIDA  right sided flank pain  CT scan with large collection,     s/p placement of IR drain  Cultures sent  GNR in process  BILI of 0.6 in fluid,     - on Ertapenem  - consider surgical evaluation ? biloma         - follow up all outstanding cultures  - trend temperature and WBC curve  - repeat cultures from blood and all sources if febrile.

## 2019-05-21 NOTE — PROGRESS NOTE ADULT - ASSESSMENT
89 years old male with PMH of Colon Cancer s/p Resection, Hiatal Hernia, HTN and HLD brought by family with right upper quadrant and right shoulder pain. As per patient, pain started 5 days prior on back side of right chest and then radiated to right shoulder and right upper quadrant. She pulled her right shoulder while getting into her son's truck 5 days ago and she was attributing the pain to it.   It is associated with nausea and decrease appetite. For 3 days, pain was mostly in right upper quadrant and flank area and it is getting worse so she was brought to ER.  Pt admitted. HIDA scan negative for cholecystitis, US of renal with abnormalities on the right urethral kidney area , CT with possible perinephric collection/urinoma.  Urology consulted and recommended IR drainage of fluid collection.  Patient s/p IR drainage 5/20/19 with thick green drainage.  Awaiting culture results.    1- Flank pain/RUQ pain improving   CT scan with possible perinephric  collection/ urinoma    Urology following.   Continue Invanz   trend leukocytosis   rose inserted by urology    2- ARF  on CRF un known baseline   Continue iv hydration ,  Monitor BMP.  I and O 's     3- Moderate-severe protein calorie malnutrition   cont ensure with meals, Nutritional consult  may add marinol for appetite     4- Cholelithiasis   HIDA scan 5/17/19, no cholecystitis   asymptomatic    DNR/DNI   PT consulted and recommended Home PT.  Disposition: Pending fluid collection culture results 89 years old male with PMH of Colon Cancer s/p Resection, Hiatal Hernia, HTN and HLD brought by family with right upper quadrant and right shoulder pain. As per patient, pain started 5 days prior on back side of right chest and then radiated to right shoulder and right upper quadrant. She pulled her right shoulder while getting into her son's truck 5 days ago and she was attributing the pain to it.   It is associated with nausea and decrease appetite. For 3 days, pain was mostly in right upper quadrant and flank area and it is getting worse so she was brought to ER.  Pt admitted. HIDA scan negative for cholecystitis, US of renal with abnormalities on the right urethral kidney area , CT with possible perinephric collection/urinoma.  Urology consulted and recommended IR drainage of fluid collection.  Patient s/p IR drainage 5/20/19 with thick green drainage.  Awaiting culture results.    1- Flank pain/RUQ pain improving   CT scan with possible perinephric  collection/ urinoma  s/p IR drainage and fluid sent for cx  Urology following.   Continue Invanz and ID following  on probiotics while on abx  trend leukocytosis   rose inserted by urology    2- ARF on CRF unknown baseline   Continue iv hydration  Monitor BMP.  renal following    3- Moderate-severe protein calorie malnutrition   cont ensure with meals, Nutritional consult  may add marinol for appetite     4- Cholelithiasis   HIDA scan 5/17/19, no cholecystitis   asymptomatic    DNR/DNI   PT consulted and recommended Home PT.  Disposition: Pending fluid collection culture results

## 2019-05-21 NOTE — CONSULT NOTE ADULT - ASSESSMENT
89 years old male with PMH of Colon Cancer s/p Resection, Hiatal Hernia, HTN and HLD brought by family with right upper quadrant and right shoulder pain. As per patient, pain started 5 days prior to admission on back side of right chest and then radiated to right shoulder and right upper quadrant a/w nausea and anorexia. Found to have renal collection and Cholyducolithiasis.  S/p drainage and drain placed for collection. Awaiting culture results.  Palliative care called for GOC.

## 2019-05-21 NOTE — CONSULT NOTE ADULT - PROBLEM SELECTOR RECOMMENDATION 3
Spoke with pt at bedside. asked that I call her dtr to talk.  Called Reyna.  questions answered emotional support provided.  Pt was functional at home able to walk and stay home by herself.  Plan is to go home with help at home.  Reyna works and does not know if she will be able to care for her.  Social work and Case management to assist with home care.  Palliative care will sign off as GOC are defined.  Please reconsult if goals change or assist is required with symptoms.

## 2019-05-21 NOTE — CONSULT NOTE ADULT - SUBJECTIVE AND OBJECTIVE BOX
HPI:  89 years old male with PMH of Colon Cancer s/p Resection, Hiatal Hernia, HTN and HLD brought by family with right upper quadrant and right shoulder pain. As per patient, pain started 5 days prior to admission on back side of right chest and then radiated to right shoulder and right upper quadrant a/w nausea and anorexia. Found to have renal collection and Choloducolithiasis.  S/p drainage and drain placed for collection. Awaiting culture results      PERTINENT PM/SXH:   HLD (hyperlipidemia)  Hiatal hernia  Colon cancer  HTN (hypertension)    H/O hemicolectomy    FAMILY HISTORY:  No pertinent family history in first degree relatives    ITEMS NOT CHECKED ARE NOT PRESENT    SOCIAL HISTORY:   Significant other/partner:  [x ]  Children: son and Dtr [ ]  Druze/Spirituality:  Substance hx:  [ ]   Tobacco hx:  [ ]   Alcohol hx: [ ]   Home Opioid hx:  [ ] I-Stop Reference No:  Living Situation: [x ]Home with dtr Reyna [ ]Long term care  [ ]Rehab [ ]Other    ADVANCE DIRECTIVES:    DNR  Yes  MOLST  [x ]  5/19/19  Living Will  [ ]   DECISION MAKER(s): Reyna  [ ] Health Care Proxy(s)  [ ] Surrogate(s)  [ ] Guardian           Name(s): Phone Number(s): Reyna [phone]    BASELINE (I)ADL(s) (prior to admission):  [place]: [ ]Total  [x ] Moderate [ ]Dependent    Allergies  No Known Allergies    Intolerances    MEDICATIONS  (STANDING):  atorvastatin 10 milliGRAM(s) Oral at bedtime  dextrose 5% + sodium chloride 0.45% 1000 milliLiter(s) (100 mL/Hr) IV Continuous <Continuous>  ertapenem  IVPB      ertapenem  IVPB 500 milliGRAM(s) IV Intermittent every 24 hours  fentaNYL   Patch  12 MICROgram(s)/Hr 1 Patch Transdermal every 72 hours  lidocaine   Patch 1 Patch Transdermal daily  montelukast 10 milliGRAM(s) Oral daily  pantoprazole    Tablet 40 milliGRAM(s) Oral before breakfast  saccharomyces boulardii 250 milliGRAM(s) Oral two times a day  senna 2 Tablet(s) Oral at bedtime  tamsulosin 0.4 milliGRAM(s) Oral at bedtime    MEDICATIONS  (PRN):  acetaminophen   Tablet .. 650 milliGRAM(s) Oral every 6 hours PRN Temp greater or equal to 38C (100.4F), Mild Pain (1 - 3)  HYDROmorphone  Injectable 0.5 milliGRAM(s) IV Push every 4 hours PRN Severe Pain (7 - 10)  ondansetron Injectable 4 milliGRAM(s) IV Push every 6 hours PRN Nausea and/or Vomiting  polyethylene glycol 3350 17 Gram(s) Oral daily PRN Constipation  traMADol 25 milliGRAM(s) Oral every 4 hours PRN Moderate Pain (4 - 6)    PRESENT SYMPTOMS: [ ]Unable to obtain due to poor mentation   Source if other than patient:  [ ]Family   [ ]Team     Pain (Impact on QOL):  pt reports no pain  Location -         Minimal acceptable level (0-10 scale):                    Aggravating factors -  Quality -  Radiation -  Severity (0-10 scale) -    Timing -    PAIN AD Score: 0    [email or web address].[email or web address] (press ctrl +  left click to view)    Dyspnea:                           [ ]Mild [ ]Moderate [ ]Severe  Anxiety:                             [ ]Mild [ ]Moderate [ ]Severe  Fatigue:                             [x ]Mild [ ]Moderate [ ]Severe  Nausea:                             [ ]Mild [ ]Moderate [ ]Severe  Loss of appetite:              [x ]Mild [ ]Moderate [ ]Severe  Constipation:                    [ ]Mild [ ]Moderate [ ]Severe    Other Symptoms:  [ ]All other review of systems negative     Karnofsky Performance Score/Palliative Performance Status Version 2:     40  %    http://palliative.info/resource_material/[email or web address].pdf    PHYSICAL EXAM:  Vital Signs Last 24 Hrs  T(C): 36.4 (21 May 2019 07:09), Max: 36.5 (20 May 2019 23:36)  T(F): 97.5 (21 May 2019 07:09), Max: 97.7 (20 May 2019 23:36)  HR: 81 (21 May 2019 07:09) (70 - 95)  BP: 111/68 (21 May 2019 07:09) (104/64 - 127/74)  BP(mean): --  RR: 18 (21 May 2019 07:09) (18 - 18)  SpO2: 93% (21 May 2019 07:09) (93% - 97%) I&O's Summary    20 May 2019 07:01  -  21 May 2019 07:00  --------------------------------------------------------  IN: 1300 mL / OUT: 1580 mL / NET: -280 mL    GENERAL:  [x ]Alert  [ x]Oriented x   [ ]Lethargic  [ ]Cachexia  [ ]Unarousable  [z ]Verbal  [ ]Non-Verbal    Behavioral:   [ ] Anxiety  [ ] Delirium [ ] Agitation [ ] Other    HEENT:  [ ]Normal   [z ]Dry mouth   [ ]ET Tube/Trach  [ ]Oral lesions    PULMONARY:   [z ]Clear [ ]Tachypnea  [ ]Audible excessive secretions   [ ]Rhonchi        [ ]Right [ ]Left [ ]Bilateral  [ ]Crackles        [ ]Right [ ]Left [ ]Bilateral  [ ]Wheezing     [ ]Right [ ]Left [ ]Bilateral    CARDIOVASCULAR:    [ ]Regular [ ]Irregular [ x]Tachy  [ ]Yuan [ ]Murmur [ ]Other    GASTROINTESTINAL:  [x ]Soft  [ ]Distended   [x ]+BS  [ ]Non tender [ ]Tender  [ ]PEG [ ]OGT/ NGT  Last BM:     GENITOURINARY:  [x]Normal [ ] Incontinent   [ ]Oliguria/Anuria   [ ]Smith    MUSCULOSKELETAL:   [ ]Normal   [ x]Weakness  [ x]Bed/Wheelchair bound [ ]Edema    NEUROLOGIC:   [x ]No focal deficits  [ ] Cognitive impairment  [ ] Dysphagia [ ]Dysarthria [ ] Paresis [ ]Other     SKIN:   [x ]Normal   [ ]Pressure ulcer(s)  [ ]Rash R neph tube    CRITICAL CARE:  [ ] Shock Present  [ ] Septic [ ]Cardiogenic [ ]Neurologic [ ]Hypovolemic  [ ]  Vasopressors [ ]  Inotropes   [ ] Respiratory failure present  [ ] Acute  [ ] Chronic [ ] Hypoxic  [ ] Hypercarbic [ ] Other  [ ] Other organ failure     LABS:                        11.2   11.2  )-----------( 203      ( 21 May 2019 08:02 )             32.9   05-21    141  |  108<H>  |  76.0<H>  ----------------------------<  148<H>  3.5   |  19.0<L>  |  2.15<H>    Ca    10.1      21 May 2019 08:02          RADIOLOGY & ADDITIONAL STUDIES:    PROTEIN CALORIE MALNUTRITION PRESENT: [ ] Yes [ ] No  [ ] PPSV2 < or = to 30% [ ] significant weight loss  [ ] poor nutritional intake [ ] catabolic state [ ] anasarca     Albumin, Serum: 1.8 g/dL (05-18-19 @ 18:15)  Artificial Nutrition [ ]     REFERRALS:   [ ]Chaplaincy  [ ] Hospice  [ ]Child Life  [ ]Social Work  [ ]Case management [ ]Holistic Therapy   Goals of Care Discussion Document: KE Wilcox (05-19-19 @ 16:37)  Goals of Care Conversation:   Participants:  · Child(andria)  Yessica Hernandez    Advance Directives:  · Does patient have Advance Directive  No  · Do you want to complete the HCP and name a [facility] Care Agent  No  · Does Patient Have a Surrogate  Yes  · Surrogate's Name  tammy Fabian    Conversation Discussion:  · Conversation  Diagnosis; Prognosis; MOLST Discussed; Treatment Options; Palliative Care Referral  · Conversation Details  discussed all findings , pt's condition and current therapy   pt is with advanced age , poor oral intake renal failure , HTN and abnormal CT sacn of kidney   overall prognosis poor   daughter had decided to sign MOLTS form , helped her   she wants her mom to go to NH if improves, may consider comfort measures or hospice if does not improve  DNR /DNI   cont current medical therapy      Electronic Signatures:  Violetta Wilcox)  (Signed 19-May-2019 16:40)  	Authored: Goals of Care Conversation      Last Updated: 19-May-2019 16:40 by Violetta Wilcox)

## 2019-05-21 NOTE — PHYSICAL THERAPY INITIAL EVALUATION ADULT - PERTINENT HX OF CURRENT PROBLEM, REHAB EVAL
Pt presents to [facility] with reports of shoulder and RUQ pain, found to have cholecystitis

## 2019-05-21 NOTE — PROGRESS NOTE ADULT - SUBJECTIVE AND OBJECTIVE BOX
NEPHROLOGY INTERVAL HPI/OVERNIGHT EVENTS:    Examined     MEDICATIONS  (STANDING):  atorvastatin 10 milliGRAM(s) Oral at bedtime  dextrose 5% + sodium chloride 0.45% 1000 milliLiter(s) (100 mL/Hr) IV Continuous <Continuous>  ertapenem  IVPB      ertapenem  IVPB 500 milliGRAM(s) IV Intermittent every 24 hours  fentaNYL   Patch  12 MICROgram(s)/Hr 1 Patch Transdermal every 72 hours  heparin  Injectable 5000 Unit(s) SubCutaneous every 12 hours  lidocaine   Patch 1 Patch Transdermal daily  montelukast 10 milliGRAM(s) Oral daily  pantoprazole    Tablet 40 milliGRAM(s) Oral before breakfast  saccharomyces boulardii 250 milliGRAM(s) Oral two times a day  senna 2 Tablet(s) Oral at bedtime  tamsulosin 0.4 milliGRAM(s) Oral at bedtime    MEDICATIONS  (PRN):  acetaminophen   Tablet .. 650 milliGRAM(s) Oral every 6 hours PRN Temp greater or equal to 38C (100.4F), Mild Pain (1 - 3)  HYDROmorphone  Injectable 0.5 milliGRAM(s) IV Push every 4 hours PRN Severe Pain (7 - 10)  ondansetron Injectable 4 milliGRAM(s) IV Push every 6 hours PRN Nausea and/or Vomiting  polyethylene glycol 3350 17 Gram(s) Oral daily PRN Constipation  traMADol 25 milliGRAM(s) Oral every 4 hours PRN Moderate Pain (4 - 6)      Allergies    No Known Allergies    Intolerances        Vital Signs Last 24 Hrs  T(C): 36.4 (21 May 2019 07:09), Max: 36.5 (20 May 2019 23:36)  T(F): 97.5 (21 May 2019 07:09), Max: 97.7 (20 May 2019 23:36)  HR: 81 (21 May 2019 07:09) (70 - 95)  BP: 111/68 (21 May 2019 07:09) (104/64 - 127/74)  BP(mean): --  RR: 18 (21 May 2019 07:09) (18 - 18)  SpO2: 93% (21 May 2019 07:09) (93% - 97%)  Daily     Daily     PHYSICAL EXAM:  GENERAL: appears chronically ill, frail  HEAD:  Atraumatic, Normocephalic  EYES: EOMI  NECK: Supple, neck  veins full  NERVOUS SYSTEM:  Alert & Oriented X3  CHEST/LUNG: Clear to percussion bilaterally; No rales  HEART: Regular rate and rhythm; No murmur  ABDOMEN: Soft, Nontender, Nondistended; Bowel sounds present    LABS:                        11.2   11.2  )-----------( 203      ( 21 May 2019 08:02 )             32.9     05-21    141  |  108<H>  |  76.0<H>  ----------------------------<  148<H>  3.5   |  19.0<L>  |  2.15<H>    Ca    10.1      21 May 2019 08:02                  RADIOLOGY & ADDITIONAL TESTS:

## 2019-05-21 NOTE — PHYSICAL THERAPY INITIAL EVALUATION ADULT - GENERAL OBSERVATIONS, REHAB EVAL
Pt rec'd in room upright in the bed, HOB elevated. Pt breathing RA in NAD. Pt with rose to bedside. Pt without bed alarm.

## 2019-05-21 NOTE — CONSULT NOTE ADULT - SUBJECTIVE AND OBJECTIVE BOX
GENERAL SURGERY CONSULT    Consulting surgical team: [facility] - Acute Care Surgery  Consulting attending: Maxime  Patient seen and examined: 05-21-19 @ 17:49        HPI:  89 years old male with PMH of Colon Cancer s/p Resection, Hiatal Hernia, HTN and HLD brought by family with right upper quadrant and right shoulder pain. As per patient, pain started 5 days ago on back side of right chest and then radiated to right shoulder and right upper quadrant. She pulled her right shoulder while getting into her son's truck 5 days ago and she was attributing the pain to it.   It is associated with nausea and decrease appetite. For the last 3 days pain is mostly in right upper quadrant and it is getting worse so she was brought to ER.   Denies fever, urinary symptoms, diarrhea or vomiting. Denies shoulder injury in past. (16 May 2019 17:30)    ACS was initially consulted to rule out cholecystitis; HIDA scan negative. A noncontrast CT A/P was performed which was evident for  large right perinephric fluid collection. Urology initially consulted for possible urinoma. IR performed CT guided drainage of collection with 10f drain placed. 20cc of green fluid was removed. Fluid analysis revealed presence of bilirubin and gram negative rods. ACS is consulted for evaluation of biloma. Per patient, since placement of drain, she has had improvement of pain. Denies nausea, vomiting, fever, chills, diarrhea and constipation.        PAST MEDICAL HISTORY:  HLD (hyperlipidemia)  Hiatal hernia  Colon cancer  HTN (hypertension)      PAST SURGICAL HISTORY:  H/O hemicolectomy      ALLERGIES:  No Known Allergies      MEDICATIONS:  acetaminophen   Tablet .. 650 milliGRAM(s) Oral every 6 hours PRN  atorvastatin 10 milliGRAM(s) Oral at bedtime  dextrose 5% + sodium chloride 0.45% 1000 milliLiter(s) IV Continuous <Continuous>  ertapenem  IVPB      ertapenem  IVPB 500 milliGRAM(s) IV Intermittent every 24 hours  fentaNYL   Patch  12 MICROgram(s)/Hr 1 Patch Transdermal every 72 hours  heparin  Injectable 5000 Unit(s) SubCutaneous every 12 hours  HYDROmorphone  Injectable 0.5 milliGRAM(s) IV Push every 4 hours PRN  lidocaine   Patch 1 Patch Transdermal daily  montelukast 10 milliGRAM(s) Oral daily  ondansetron Injectable 4 milliGRAM(s) IV Push every 6 hours PRN  pantoprazole    Tablet 40 milliGRAM(s) Oral before breakfast  polyethylene glycol 3350 17 Gram(s) Oral daily PRN  saccharomyces boulardii 250 milliGRAM(s) Oral two times a day  senna 2 Tablet(s) Oral at bedtime  tamsulosin 0.4 milliGRAM(s) Oral at bedtime  traMADol 25 milliGRAM(s) Oral every 4 hours PRN      VITALS & I/Os:  Vital Signs Last 24 Hrs  T(C): 36.4 (21 May 2019 07:09), Max: 36.5 (20 May 2019 23:36)  T(F): 97.5 (21 May 2019 07:09), Max: 97.7 (20 May 2019 23:36)  HR: 81 (21 May 2019 07:09) (81 - 95)  BP: 111/68 (21 May 2019 07:09) (104/64 - 127/74)  BP(mean): --  RR: 18 (21 May 2019 07:09) (18 - 18)  SpO2: 93% (21 May 2019 07:09) (93% - 97%)    I&O's Summary    20 May 2019 07:01  -  21 May 2019 07:00  --------------------------------------------------------  IN: 1300 mL / OUT: 1580 mL / NET: -280 mL    21 May 2019 07:01  -  21 May 2019 17:49  --------------------------------------------------------  IN: 0 mL / OUT: 400 mL / NET: -400 mL        PHYSICAL EXAM:  General: No acute distress  Respiratory: Nonlabored  Cardiovascular: S1/S2  Abdominal: Soft, nontender, No guarding or rebound. 10f drain in place to right upper quadrant, draining greenish fluid  : Smith in place  Extremities: Warm  Vascular: Radial pulse 2+, bilaterally    LABS:                        11.2   11.2  )-----------( 203      ( 21 May 2019 08:02 )             32.9     05-21    141  |  108<H>  |  76.0<H>  ----------------------------<  148<H>  3.5   |  19.0<L>  |  2.15<H>    Ca    10.1      21 May 2019 08:02    TPro  x   /  Alb  x   /  TBili  0.8  /  DBili  0.6<H>  /  AST  x   /  ALT  x   /  AlkPhos  x   05-21          ASSESSMENT:  89y Female presents with right retroperitonal fluid collection  - Hemodynamically normal  - WBC downtrending  - CT imaging reviewed with attending    PLAN:  - No acute surgical intervention  - Given location of fluid collection, it is possible for a small leak from liver or duodenum which can cause biloma. As patient's symptoms are improving and WBC downtrending, no surgical intervention at this time. No source of leak was identified on CT imaging.  - Continue IR drain  - Continue IV antibiotics    Discussed and seen with Dr. Vasquez GENERAL SURGERY CONSULT    Consulting surgical team: [facility] - Acute Care Surgery  Consulting attending: Maxime  Patient seen and examined: 05-21-19 @ 17:49        HPI:  89 years old male with PMH of Colon Cancer s/p Resection, Hiatal Hernia, HTN and HLD brought by family with right upper quadrant and right shoulder pain. As per patient, pain started 5 days ago on back side of right chest and then radiated to right shoulder and right upper quadrant. She pulled her right shoulder while getting into her son's truck 5 days ago and she was attributing the pain to it.   It is associated with nausea and decrease appetite. For the last 3 days pain is mostly in right upper quadrant and it is getting worse so she was brought to ER.   Denies fever, urinary symptoms, diarrhea or vomiting. Denies shoulder injury in past. (16 May 2019 17:30)    ACS was initially consulted to rule out cholecystitis; HIDA scan negative. A noncontrast CT A/P was performed which was evident for  large right perinephric fluid collection. Urology initially consulted for possible urinoma. IR performed CT guided drainage of collection with 10f drain placed. 20cc of green fluid was removed. Fluid analysis revealed presence of bilirubin and gram negative rods. ACS is consulted for evaluation of biloma. Per patient, since placement of drain, she has had improvement of pain. Denies nausea, vomiting, fever, chills, diarrhea and constipation.        PAST MEDICAL HISTORY:  HLD (hyperlipidemia)  Hiatal hernia  Colon cancer  HTN (hypertension)      PAST SURGICAL HISTORY:  H/O hemicolectomy      ALLERGIES:  No Known Allergies      MEDICATIONS:  acetaminophen   Tablet .. 650 milliGRAM(s) Oral every 6 hours PRN  atorvastatin 10 milliGRAM(s) Oral at bedtime  dextrose 5% + sodium chloride 0.45% 1000 milliLiter(s) IV Continuous <Continuous>  ertapenem  IVPB      ertapenem  IVPB 500 milliGRAM(s) IV Intermittent every 24 hours  fentaNYL   Patch  12 MICROgram(s)/Hr 1 Patch Transdermal every 72 hours  heparin  Injectable 5000 Unit(s) SubCutaneous every 12 hours  HYDROmorphone  Injectable 0.5 milliGRAM(s) IV Push every 4 hours PRN  lidocaine   Patch 1 Patch Transdermal daily  montelukast 10 milliGRAM(s) Oral daily  ondansetron Injectable 4 milliGRAM(s) IV Push every 6 hours PRN  pantoprazole    Tablet 40 milliGRAM(s) Oral before breakfast  polyethylene glycol 3350 17 Gram(s) Oral daily PRN  saccharomyces boulardii 250 milliGRAM(s) Oral two times a day  senna 2 Tablet(s) Oral at bedtime  tamsulosin 0.4 milliGRAM(s) Oral at bedtime  traMADol 25 milliGRAM(s) Oral every 4 hours PRN      VITALS & I/Os:  Vital Signs Last 24 Hrs  T(C): 36.4 (21 May 2019 07:09), Max: 36.5 (20 May 2019 23:36)  T(F): 97.5 (21 May 2019 07:09), Max: 97.7 (20 May 2019 23:36)  HR: 81 (21 May 2019 07:09) (81 - 95)  BP: 111/68 (21 May 2019 07:09) (104/64 - 127/74)  BP(mean): --  RR: 18 (21 May 2019 07:09) (18 - 18)  SpO2: 93% (21 May 2019 07:09) (93% - 97%)    I&O's Summary    20 May 2019 07:01  -  21 May 2019 07:00  --------------------------------------------------------  IN: 1300 mL / OUT: 1580 mL / NET: -280 mL    21 May 2019 07:01  -  21 May 2019 17:49  --------------------------------------------------------  IN: 0 mL / OUT: 400 mL / NET: -400 mL        PHYSICAL EXAM:  General: No acute distress  Respiratory: Nonlabored  Cardiovascular: S1/S2  Abdominal: Soft, nontender, No guarding or rebound. 10f drain in place to right upper quadrant, draining greenish fluid  : Smith in place  Extremities: Warm  Vascular: Radial pulse 2+, bilaterally    LABS:                        11.2   11.2  )-----------( 203      ( 21 May 2019 08:02 )             32.9     05-21    141  |  108<H>  |  76.0<H>  ----------------------------<  148<H>  3.5   |  19.0<L>  |  2.15<H>    Ca    10.1      21 May 2019 08:02    TPro  x   /  Alb  x   /  TBili  0.8  /  DBili  0.6<H>  /  AST  x   /  ALT  x   /  AlkPhos  x   05-21          ASSESSMENT:  89y Female presents with right retroperitonal fluid collection  - Hemodynamically normal  - WBC downtrending  - CT imaging reviewed with attending    PLAN:  - No acute surgical intervention  - Given location of fluid collection, it is possible for a small leak from liver or duodenum which can cause biloma. As patient's symptoms are improving and WBC downtrending, no surgical intervention at this time. No source of leak was identified on CT imaging.  - Continue IR drain  - Continue IV antibiotics    Discussed and seen with Dr. Vasquez    serial abd exams  F/U labs  Pt will be monitored for signs of evolution/resolution of pathology and surgical intervention as required and warranted GENERAL SURGERY CONSULT    Consulting surgical team: [facility] - Acute Care Surgery  Consulting attending: Ashok  Patient seen and examined: 05-21-19 @ 17:49        HPI:  89 years old male with PMH of Colon Cancer s/p Resection, Hiatal Hernia, HTN and HLD brought by family with right upper quadrant and right shoulder pain. As per patient, pain started 5 days ago on back side of right chest and then radiated to right shoulder and right upper quadrant. She pulled her right shoulder while getting into her son's truck 5 days ago and she was attributing the pain to it.   It is associated with nausea and decrease appetite. For the last 3 days pain is mostly in right upper quadrant and it is getting worse so she was brought to ER.   Denies fever, urinary symptoms, diarrhea or vomiting. Denies shoulder injury in past. (16 May 2019 17:30)    ACS was initially consulted to rule out cholecystitis; HIDA scan negative. A noncontrast CT A/P was performed which was evident for  large right perinephric fluid collection. Urology initially consulted for possible urinoma. IR performed CT guided drainage of collection with 10f drain placed. 20cc of green fluid was removed. Fluid analysis revealed presence of bilirubin and gram negative rods. ACS is consulted for evaluation of biloma. Per patient, since placement of drain, she has had improvement of pain. Denies nausea, vomiting, fever, chills, diarrhea and constipation.        PAST MEDICAL HISTORY:  HLD (hyperlipidemia)  Hiatal hernia  Colon cancer  HTN (hypertension)      PAST SURGICAL HISTORY:  H/O hemicolectomy      ALLERGIES:  No Known Allergies      MEDICATIONS:  acetaminophen   Tablet .. 650 milliGRAM(s) Oral every 6 hours PRN  atorvastatin 10 milliGRAM(s) Oral at bedtime  dextrose 5% + sodium chloride 0.45% 1000 milliLiter(s) IV Continuous <Continuous>  ertapenem  IVPB      ertapenem  IVPB 500 milliGRAM(s) IV Intermittent every 24 hours  fentaNYL   Patch  12 MICROgram(s)/Hr 1 Patch Transdermal every 72 hours  heparin  Injectable 5000 Unit(s) SubCutaneous every 12 hours  HYDROmorphone  Injectable 0.5 milliGRAM(s) IV Push every 4 hours PRN  lidocaine   Patch 1 Patch Transdermal daily  montelukast 10 milliGRAM(s) Oral daily  ondansetron Injectable 4 milliGRAM(s) IV Push every 6 hours PRN  pantoprazole    Tablet 40 milliGRAM(s) Oral before breakfast  polyethylene glycol 3350 17 Gram(s) Oral daily PRN  saccharomyces boulardii 250 milliGRAM(s) Oral two times a day  senna 2 Tablet(s) Oral at bedtime  tamsulosin 0.4 milliGRAM(s) Oral at bedtime  traMADol 25 milliGRAM(s) Oral every 4 hours PRN      VITALS & I/Os:  Vital Signs Last 24 Hrs  T(C): 36.4 (21 May 2019 07:09), Max: 36.5 (20 May 2019 23:36)  T(F): 97.5 (21 May 2019 07:09), Max: 97.7 (20 May 2019 23:36)  HR: 81 (21 May 2019 07:09) (81 - 95)  BP: 111/68 (21 May 2019 07:09) (104/64 - 127/74)  BP(mean): --  RR: 18 (21 May 2019 07:09) (18 - 18)  SpO2: 93% (21 May 2019 07:09) (93% - 97%)    I&O's Summary    20 May 2019 07:01  -  21 May 2019 07:00  --------------------------------------------------------  IN: 1300 mL / OUT: 1580 mL / NET: -280 mL    21 May 2019 07:01  -  21 May 2019 17:49  --------------------------------------------------------  IN: 0 mL / OUT: 400 mL / NET: -400 mL        PHYSICAL EXAM:  General: No acute distress  Respiratory: Nonlabored  Cardiovascular: S1/S2  Abdominal: Soft, nontender, No guarding or rebound. 10f drain in place to right upper quadrant, draining greenish fluid  : Smith in place  Extremities: Warm  Vascular: Radial pulse 2+, bilaterally    LABS:                        11.2   11.2  )-----------( 203      ( 21 May 2019 08:02 )             32.9     05-21    141  |  108<H>  |  76.0<H>  ----------------------------<  148<H>  3.5   |  19.0<L>  |  2.15<H>    Ca    10.1      21 May 2019 08:02    TPro  x   /  Alb  x   /  TBili  0.8  /  DBili  0.6<H>  /  AST  x   /  ALT  x   /  AlkPhos  x   05-21          ASSESSMENT:  89y Female presents with right retroperitonal fluid collection  - Hemodynamically normal  - WBC downtrending  - CT imaging reviewed with attending    PLAN:  - No acute surgical intervention  - Given location of fluid collection, it is possible for a small leak from duodenum which can cause retroperitoneum biloma. As patient's symptoms are improving and WBC downtrending, no surgical intervention at this time. No source of leak was identified on CT imaging.  - Continue IR drain  - Continue IV antibiotics  - Recommend repeat CT A/P with gastrogaffin  - Recommend protonix drip as well  - Recommend GI Consult  - We will follow    Discussed and seen with Dr. Ordoñez    serial abd exams  F/U labs  Pt will be monitored for signs of evolution/resolution of pathology and surgical intervention as required and warranted

## 2019-05-21 NOTE — PROGRESS NOTE ADULT - SUBJECTIVE AND OBJECTIVE BOX
CC: RUQ and Right Shoulder Pain (21 May 2019 12:04)    HPI:  89 years old male with PMH of Colon Cancer s/p Resection, Hiatal Hernia, HTN and HLD brought by family with right upper quadrant and right shoulder pain.     INTERVAL HPI/OVERNIGHT EVENTS: Patient seen and examined lying in bed.  Patient reports feeling better.  Patient denies any headache, dizziness, SOB, CP, abdominal pain, nausea, vomiting.  Other ROS reviewed and are negative.    Vital Signs Last 24 Hrs  T(C): 36.4 (21 May 2019 07:09), Max: 36.5 (20 May 2019 23:36)  T(F): 97.5 (21 May 2019 07:09), Max: 97.7 (20 May 2019 23:36)  HR: 81 (21 May 2019 07:09) (70 - 95)  BP: 111/68 (21 May 2019 07:09) (104/64 - 127/74)  BP(mean): --  RR: 18 (21 May 2019 07:09) (18 - 18)  SpO2: 93% (21 May 2019 07:09) (93% - 97%)  I&O's Detail    20 May 2019 07:01  -  21 May 2019 07:00  --------------------------------------------------------  IN:    dextrose 5% + sodium chloride 0.45%: 1300 mL  Total IN: 1300 mL    OUT:    Bulb: 430 mL    Indwelling Catheter - Urethral: 700 mL    Intermittent Catheterization - Urethral: 450 mL  Total OUT: 1580 mL    Total NET: -280 mL    PHYSICAL EXAM:  GENERAL: NAD  HEAD:  Atraumatic, Normocephalic  NECK: Supple, No JVD, Normal thyroid  NERVOUS SYSTEM:  Alert & Oriented X3, Good concentration; generalized weakness  CHEST/LUNG: Clear to auscultation bilaterally; No rales, rhonchi, wheezing, or rubs  HEART: Regular rate and rhythm; No murmurs, rubs, or gallops  ABDOMEN: Soft, Nontender, Nondistended; Bowel sounds present; (+) Smith; (+) SEAN drain in right sided abdomen with green drainage  EXTREMITIES:  2+ Peripheral Pulses, No clubbing, cyanosis, or edema                          11.2   11.2  )-----------( 203      ( 21 May 2019 08:02 )             32.9     21 May 2019 08:02    141    |  108    |  76.0   ----------------------------<  148    3.5     |  19.0   |  2.15     Ca    10.1       21 May 2019 08:02      Hemoglobin A1C, Whole Blood: 5.2 % (05-20-19 @ 10:37)    MEDICATIONS  (STANDING):  atorvastatin 10 milliGRAM(s) Oral at bedtime  dextrose 5% + sodium chloride 0.45% 1000 milliLiter(s) (100 mL/Hr) IV Continuous <Continuous>  ertapenem  IVPB      ertapenem  IVPB 500 milliGRAM(s) IV Intermittent every 24 hours  fentaNYL   Patch  12 MICROgram(s)/Hr 1 Patch Transdermal every 72 hours  lidocaine   Patch 1 Patch Transdermal daily  montelukast 10 milliGRAM(s) Oral daily  pantoprazole    Tablet 40 milliGRAM(s) Oral before breakfast  saccharomyces boulardii 250 milliGRAM(s) Oral two times a day  senna 2 Tablet(s) Oral at bedtime  tamsulosin 0.4 milliGRAM(s) Oral at bedtime    MEDICATIONS  (PRN):  acetaminophen   Tablet .. 650 milliGRAM(s) Oral every 6 hours PRN Temp greater or equal to 38C (100.4F), Mild Pain (1 - 3)  HYDROmorphone  Injectable 0.5 milliGRAM(s) IV Push every 4 hours PRN Severe Pain (7 - 10)  ondansetron Injectable 4 milliGRAM(s) IV Push every 6 hours PRN Nausea and/or Vomiting  polyethylene glycol 3350 17 Gram(s) Oral daily PRN Constipation  traMADol 25 milliGRAM(s) Oral every 4 hours PRN Moderate Pain (4 - 6)

## 2019-05-21 NOTE — PROGRESS NOTE ADULT - SUBJECTIVE AND OBJECTIVE BOX
[facility]  INFECTIOUS DISEASES AND INTERNAL MEDICINE at [place]  =======================================================  Elio James MD [facility]   Sanjay Rendon MD  Diplomates American Board of Internal Medicine and Infectious Diseases  Telephone [phone]  Fax            [phone]  =======================================================    [id number]  BETY TRINDIAD   follow up for:  fevers, right lower abd pain  patient seen and examined.     s/p placement of IR drain in the RIGHT abd  green fluid per note, Cultures sent. Gram stain with GNR   ===================================================  REVIEW OF SYSTEMS:  as above  all other ROS negative    =======================================================  Allergies  No Known Allergies    Antibiotics:  ertapenem  IVPB      ertapenem  IVPB 500 milliGRAM(s) IV Intermittent every 24 hours    ======================================================  Physical Exam:  ============  T(F): 97.5 (21 May 2019 07:09), Max: 97.7 (20 May 2019 23:36)  HR: 81 (21 May 2019 07:09)  BP: 111/68 (21 May 2019 07:09)  RR: 18 (21 May 2019 07:09)  SpO2: 93% (21 May 2019 07:09) (93% - 97%)  temp max in last 48H T(F): , Max: 97.9 (05-19-19 @ 17:05)    General:  No acute distress. FRAIL  Sleeping  Eye: Pupils are equal, round and reactive to light, Extraocular movements are intact, Normal conjunctiva.  HENT: Normocephalic, Oral mucosa is dry  Neck: Supple, No lymphadenopathy.  Respiratory: Lungs are clear to auscultation, Respirations are non-labored.  Cardiovascular: Normal rate, Regular rhythm,   Gastrointestinal: Soft,  + bowel sounds, + right sided drain SEAN in place  Genitourinary: No costovertebral angle tenderness.  Lymphatics: No lymphadenopathy neck,   Musculoskeletal: Normal range of motion, Normal strength.  Integumentary: No rash.  Neurologic: Alert, Oriented, No focal deficits, Cranial Nerves II-XII are grossly intact.  Psychiatric: Appropriate mood & affect.      =======================================================  Labs:                        11.2   11.2  )-----------( 203      ( 21 May 2019 08:02 )             32.9       WBC Count: 11.2 K/uL (05-21-19 @ 08:02)  WBC Count: 14.6 K/uL (05-20-19 @ 10:37)  WBC Count: 16.5 K/uL (05-18-19 @ 18:15)      05-21    141  |  108<H>  |  76.0<H>  ----------------------------<  148<H>  3.5   |  19.0<L>  |  2.15<H>    Ca    10.1      21 May 2019 08:02        Culture - Acid Fast - Body Fluid w/Smear (collected 05-20-19 @ 18:27)  Source: .Body Fluid    Culture - Abscess with Gram Stain (collected 05-20-19 @ 12:16)  Source: .Abscess  Gram Stain (05-20-19 @ 15:09):    Few WBC's    No organisms seen    Culture - Blood (collected 05-16-19 @ 20:37)  Source: .Blood    Culture - Blood (collected 05-16-19 @ 20:37)  Source: .Blood    Culture - Urine (collected 05-16-19 @ 17:53)  Source: .Urine  Final Report (05-17-19 @ 17:09):    <10,000 CFU/ml Corynebacterium species

## 2019-05-21 NOTE — PHYSICAL THERAPY INITIAL EVALUATION ADULT - ADDITIONAL COMMENTS
per patient she ambulates within the home. She uses and owns a RW and a SAC as needed. She will not have to negotiate steps to enter the home or to get to her room but she says there is 1 step into the living room without a hand rail.  Pt's daughter works from home, and she feels that her daughter will be able to provide enough assistance in the house.

## 2019-05-21 NOTE — PROGRESS NOTE ADULT - ASSESSMENT
MARY improved today cr 2.1  Low BP's renal hypoperfusion  Leukocytosis dysuria UTI possible pyelo  MA   cont IVF IVF add bicarb cont   Renal sono noted no hydronephrosis  Collection surrounding lower R kidney s/p CT Abd/ pelvis =>+ choledocholithiasis   RP fluid pocket possible abscess? vs resolving hematoma or urinoma.   s/p placement of IR drain Cultures sent   ID f/u noted    Will follow

## 2019-05-22 NOTE — PROGRESS NOTE ADULT - SUBJECTIVE AND OBJECTIVE BOX
NEPHROLOGY INTERVAL HPI/OVERNIGHT EVENTS:  pt resting when seen earlier  no acute distress noted  no cp, sob, n/v/d    MEDICATIONS  (STANDING):  atorvastatin 10 milliGRAM(s) Oral at bedtime  dextrose 5% + sodium chloride 0.45% 1000 milliLiter(s) (100 mL/Hr) IV Continuous <Continuous>  ertapenem  IVPB      ertapenem  IVPB 500 milliGRAM(s) IV Intermittent every 24 hours  fentaNYL   Patch  12 MICROgram(s)/Hr 1 Patch Transdermal every 72 hours  heparin  Injectable 5000 Unit(s) SubCutaneous every 12 hours  lidocaine   Patch 1 Patch Transdermal daily  montelukast 10 milliGRAM(s) Oral daily  pantoprazole  Injectable 40 milliGRAM(s) IV Push daily  saccharomyces boulardii 250 milliGRAM(s) Oral two times a day  senna 2 Tablet(s) Oral at bedtime  tamsulosin 0.4 milliGRAM(s) Oral at bedtime    MEDICATIONS  (PRN):  acetaminophen   Tablet .. 650 milliGRAM(s) Oral every 6 hours PRN Temp greater or equal to 38C (100.4F), Mild Pain (1 - 3)  HYDROmorphone  Injectable 0.5 milliGRAM(s) IV Push every 4 hours PRN Severe Pain (7 - 10)  ondansetron Injectable 4 milliGRAM(s) IV Push every 6 hours PRN Nausea and/or Vomiting  polyethylene glycol 3350 17 Gram(s) Oral daily PRN Constipation  traMADol 25 milliGRAM(s) Oral every 4 hours PRN Moderate Pain (4 - 6)      Allergies    No Known Allergies    Intolerances              Vital Signs Last 24 Hrs  T(C): 36.4 (22 May 2019 08:25), Max: 37.1 (21 May 2019 19:19)  T(F): 97.5 (22 May 2019 08:25), Max: 98.7 (21 May 2019 19:19)  HR: 90 (22 May 2019 08:25) (88 - 96)  BP: 127/84 (22 May 2019 08:25) (120/71 - 127/84)  BP(mean): --  RR: 18 (22 May 2019 08:25) (18 - 19)  SpO2: 95% (22 May 2019 08:25) (94% - 96%)    PHYSICAL EXAM:  GENERAL: Pt elderly, debilitated  HEENT: dry mucous membranes  NECK: Supple, no jvd  NERVOUS SYSTEM:  Alert & Oriented X3; non focal  CHEST/LUNG: Clear bilaterally  HEART: Regular rate and rhythm; No rub  ABDOMEN: Soft, Nontender, Nondistended; +BS    LABS:                        11.6   10.6  )-----------( 231      ( 22 May 2019 11:26 )             34.8     05-21    141  |  108<H>  |  76.0<H>  ----------------------------<  148<H>  3.5   |  19.0<L>  |  2.15<H>    Creatinine, Serum: 2.70 mg/dL (05.20.19 @ 10:37)    Albumin, Serum: 1.8 g/dL (05.18.19 @ 18:15)        Ca    10.1      21 May 2019 08:02    TPro  x   /  Alb  x   /  TBili  0.8  /  DBili  0.6<H>  /  AST  x   /  ALT  x   /  AlkPhos  x   05-21            RADIOLOGY & ADDITIONAL TESTS:  < from: US Renal (05.19.19 @ 20:51) >     EXAM:  US KIDNEY(S)                          PROCEDURE DATE:  05/19/2019          INTERPRETATION:  CLINICAL INFORMATION: Right upper quadrant pain. Right   flank pain. Right perinephric fluid collection.    COMPARISON: CT abdomen/pelvis 5/18/2019 and abdominal ultrasound 5/17/2019    TECHNIQUE: Sonography of the kidneys.    FINDINGS:    Right kidney:  8.9 cm. Large right perinephric fluid collection again   seen. Cyst in the upper pole measuring 2.4 cm.    Left kidney:  10.0 cm. No hydronephrosis or calculus. 9 mm cyst in the   upper pole.      IMPRESSION:     Large right perinephric fluid collection; see CT abdomen/pelvis report   5/18/2019.      < end of copied text >

## 2019-05-22 NOTE — PROGRESS NOTE ADULT - ASSESSMENT
89 years old male with PMH of Colon Cancer s/p Resection, Hiatal Hernia, HTN and HLD brought by family with right upper quadrant and right shoulder pain. As per patient, pain started 5 days prior on back side of right chest and then radiated to right shoulder and right upper quadrant. She pulled her right shoulder while getting into her son's truck 5 days ago and she was attributing the pain to it.   It is associated with nausea and decrease appetite. For 3 days, pain was mostly in right upper quadrant and flank area and it is getting worse so she was brought to ER.  Pt admitted. HIDA scan negative for cholecystitis, US of renal with abnormalities on the right urethral kidney area , CT with possible perinephric collection/urinoma.  Urology consulted and recommended IR drainage of fluid collection.  Patient s/p IR drainage 5/20/19 with thick green drainage.  Awaiting culture results.    1- Flank pain/RUQ pain improving   CT scan with possible perinephric  collection/ urinoma  s/p IR drainage and fluid cx with Klebsiella pneumoniae  Urology following.   Continue Invanz and ID following  on probiotics while on abx  trend leukocytosis   rose inserted by urology  CT with contrast recommended but held as patient with MARY    2- ARF on CRF unknown baseline   Continue iv hydration  Monitor BMP.  renal following    3- Moderate-severe protein calorie malnutrition   cont ensure with meals, Nutritional consult  may add marinol for appetite     4- Cholelithiasis   HIDA scan 5/17/19, no cholecystitis   asymptomatic    DNR/DNI   PT consulted and recommended Home PT.  Disposition: Unclear date at this time. 89 years old male with PMH of Colon Cancer s/p Resection, Hiatal Hernia, HTN and HLD brought by family with right upper quadrant and right shoulder pain. As per patient, pain started 5 days prior on back side of right chest and then radiated to right shoulder and right upper quadrant. She pulled her right shoulder while getting into her son's truck 5 days ago and she was attributing the pain to it.   It is associated with nausea and decrease appetite. For 3 days, pain was mostly in right upper quadrant and flank area and it is getting worse so she was brought to ER.  Pt admitted. HIDA scan negative for cholecystitis, US of renal with abnormalities on the right urethral kidney area , CT with possible perinephric collection/urinoma.  Urology consulted and recommended IR drainage of fluid collection.  Patient s/p IR drainage 5/20/19 with thick green drainage.  Awaiting culture results.    1- Flank pain/RUQ pain improving   CT scan with possible perinephric  collection/ urinoma  s/p IR drainage and fluid cx with Klebsiella pneumoniae  Urology following.   Continue Invanz and ID following  on probiotics while on abx  trend leukocytosis; resovled  rose inserted by urology, will do a TOV today, on flomax  GI recs appreciated: CT with PO contrast ordered to further eval    2- ARF on CRF unknown baseline   Continue iv hydration  Monitor BMP: improving  renal following    3- Moderate-severe protein calorie malnutrition   cont ensure with meals, Nutritional consult     4- Cholelithiasis   HIDA scan 5/17/19, no cholecystitis   asymptomatic    DNR/DNI   PT consulted and recommended Home PT.  Disposition: Unclear date at this time. 89 years old male with PMH of Colon Cancer s/p Resection, Hiatal Hernia, HTN and HLD brought by family with right upper quadrant and right shoulder pain. As per patient, pain started 5 days prior on back side of right chest and then radiated to right shoulder and right upper quadrant. She pulled her right shoulder while getting into her son's truck 5 days ago and she was attributing the pain to it.   It is associated with nausea and decrease appetite. For 3 days, pain was mostly in right upper quadrant and flank area and it is getting worse so she was brought to ER.  Pt admitted. HIDA scan negative for cholecystitis, US of renal with abnormalities on the right urethral kidney area , CT with possible perinephric collection/urinoma.  Urology consulted and recommended IR drainage of fluid collection.  Patient s/p IR drainage 5/20/19 with thick green drainage.  Awaiting culture results.    1- Flank pain/RUQ pain improving   CT scan with possible perinephric collection/ urinoma  s/p IR drainage and fluid cx with Klebsiella pneumoniae  Urology following.   Continue Invanz and ID following  on probiotics while on abx  trend leukocytosis; resovled  rose inserted by urology, will do a TOV today, on flomax  GI recs appreciated: CT with PO contrast ordered to further eval    2- ARF on CRF unknown baseline   Continue iv hydration  Monitor BMP: improving  renal following    3- Moderate-severe protein calorie malnutrition   cont ensure with meals, Nutritional consult     4- Cholelithiasis   HIDA scan 5/17/19, no cholecystitis   asymptomatic    DNR/DNI   PT consulted and recommended Home PT.  Disposition: Unclear date at this time.

## 2019-05-22 NOTE — PROGRESS NOTE ADULT - ASSESSMENT
89 yo female with right retroperitoneal collection drained. 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  Fluid was green, now yellow enteric appearing.   r/o walled off duodenal/small bowel perforation.  Collection less likely urologic etiology.  - would treat conservatively, pt has no peritoneal signs  - IVF  - IVabx  - f/u cx's

## 2019-05-22 NOTE — PROGRESS NOTE ADULT - ASSESSMENT
ASSESSMENT:  89y Female presents with right retroperitonal fluid collection      PLAN:  - No acute surgical intervention  - Continue IR drain  - Continue IV antibiotics  - Recommend repeat CT A/P with gastrogaffin  - Recommend protonix drip  - Recommend GI Consult  - We will continue to follow  - rest of care per primary team

## 2019-05-22 NOTE — CONSULT NOTE ADULT - SUBJECTIVE AND OBJECTIVE BOX
HISTORY OF PRESENT ILLNESS: This is a 89-year-old woman with a past medical history significant for colon Cancer s/p hemicolectomy, HTN and HLD who was initially brought to the ED by her family with complaints of right upper quadrant and right shoulder pain.  Her pain started five days prior to presentation on the back side of right chest and then radiated to right shoulder and right upper quadrant.  She reportedly pulled her right shoulder while getting into her son's truck five days prior to presentation and believed it was the etiology of her pain.  The pain was  associated with nausea and decrease appetite.  For the three days prior to presentation, the pain was chiefly in the right upper quadrant, and because it was worsening, she was brought to the ED for further evaluation.      On initial presentation, there was concern for cholecystitis, but her HIDA scan was negative.  A noncontrast CT A/P revealed a large right perinephric fluid collection, for which she underwent CT-guided drainage of the collection with interventional radiology with 10 Fr drain placed.  20 mL of green fluid was removed, and fluid analysis revealed presence of bilirubin and gram negative rods.  On the recommendation of ACS, GI was consulted to evaluate for possible biloma.  The patient reports significant improvement of pain.  She is tolerating a diet and denies nausea, vomiting, fever, chills, diarrhea and constipation.      ROS: A 14-point review of systems was completed and was otherwise negative save what was reported in the HPI.    PAST MEDICAL/SURGICAL HISTORY:  HLD (hyperlipidemia)  Hiatal hernia  Colon cancer  HTN (hypertension)  H/O hemicolectomy    SOCIAL HISTORY:  - TOBACCO: Denies  - ALCOHOL: Denies  - ILLICIT DRUG USE: Denies    FAMILY HISTORY:  No known history of gastrointestinal or liver disease;  No pertinent family history in first degree relatives    HOME MEDICATIONS:  aspirin 81 mg oral tablet: 1 tab(s) orally once a day (19 May 2019 17:50)  CeleBREX 200 mg oral capsule: 1 cap(s) orally 2 times a day (19 May 2019 17:50)  enalapril 10 mg oral tablet: 1 tab(s) orally once a day (19 May 2019 17:50)  famotidine 40 mg oral tablet: 1 tab(s) orally once a day (at bedtime) (19 May 2019 17:50)  hydroCHLOROthiazide 12.5 mg oral tablet: 1 tab(s) orally once a day (19 May 2019 17:50)  montelukast 10 mg oral tablet: 1 tab(s) orally once a day (19 May 2019 17:50)  pravastatin 10 mg oral tablet: 1 tab(s) orally once a day (19 May 2019 17:50)  Vitamin D3 1000 intl units oral tablet: 1 tab(s) orally once a day (19 May 2019 17:50)    INPATIENT MEDICATIONS:  MEDICATIONS  (STANDING):  atorvastatin 10 milliGRAM(s) Oral at bedtime  dextrose 5% + sodium chloride 0.45% 1000 milliLiter(s) (100 mL/Hr) IV Continuous <Continuous>  ertapenem  IVPB      ertapenem  IVPB 500 milliGRAM(s) IV Intermittent every 24 hours  fentaNYL   Patch  12 MICROgram(s)/Hr 1 Patch Transdermal every 72 hours  heparin  Injectable 5000 Unit(s) SubCutaneous every 12 hours  lidocaine   Patch 1 Patch Transdermal daily  montelukast 10 milliGRAM(s) Oral daily  pantoprazole  Injectable 40 milliGRAM(s) IV Push daily  saccharomyces boulardii 250 milliGRAM(s) Oral two times a day  senna 2 Tablet(s) Oral at bedtime  tamsulosin 0.4 milliGRAM(s) Oral at bedtime    MEDICATIONS  (PRN):  acetaminophen   Tablet .. 650 milliGRAM(s) Oral every 6 hours PRN Temp greater or equal to 38C (100.4F), Mild Pain (1 - 3)  HYDROmorphone  Injectable 0.5 milliGRAM(s) IV Push every 4 hours PRN Severe Pain (7 - 10)  ondansetron Injectable 4 milliGRAM(s) IV Push every 6 hours PRN Nausea and/or Vomiting  polyethylene glycol 3350 17 Gram(s) Oral daily PRN Constipation  traMADol 25 milliGRAM(s) Oral every 4 hours PRN Moderate Pain (4 - 6)    ALLERGIES:  No Known Allergies    VITAL SIGNS LAST 24 HOURS:  T(C): 36.4 (22 May 2019 08:25), Max: 37.1 (21 May 2019 19:19)  T(F): 97.5 (22 May 2019 08:25), Max: 98.7 (21 May 2019 19:19)  HR: 90 (22 May 2019 08:25) (88 - 96)  BP: 127/84 (22 May 2019 08:25) (120/71 - 127/84)  RR: 18 (22 May 2019 08:25) (18 - 19)  SpO2: 95% (22 May 2019 08:25) (94% - 96%)    05-21-19 @ 07:01  -  05-22-19 @ 07:00  --------------------------------------------------------  IN: 700 mL / OUT: 510 mL / NET: 190 mL    05-21-19 @ 07:01  -  05-22-19 @ 07:00  --------------------------------------------------------  IN: 700 mL / OUT: 510 mL / NET: 190 mL    PHYSICAL EXAM:  Constitutional: Very elderly  woman in no apparent distress  Eyes: Sclerae anicteric, conjunctivae normal  ENMT: Edentulous  Neck: No thyroid nodules appreciated, no significant cervical or supraclavicular lymphadenopathy  Respiratory: Breathing nonlabored; clear to auscultation  Cardiovascular: Regular rate and rhythm  Gastrointestinal: Soft, nontender, nondistended, normoactive bowel sounds; no hepatosplenomegaly appreciated; no rebound tenderness or involuntary guarding; right sided drain with green succus in collection bag  Extremities: No clubbing, cyanosis or edema  Neurological: Alert and oriented to person, place and time; no asterixis  Skin: No jaundice  Lymph Nodes: No significant lymphadenopathy  Musculoskeletal: No significant peripheral atrophy  Psychiatric: Affect and mood appropriate      LABS:                        11.6   10.6  )-----------( 231      ( 22 May 2019 11:26 )             34.8       05-22    143  |  108<H>  |  57.0<H>  ----------------------------<  145<H>  3.8   |  23.0  |  1.50<H>    Ca    10.1      22 May 2019 11:26  Mg     1.4     05-22    TPro  x   /  Alb  x   /  TBili  0.8  /  DBili  0.6<H>  /  AST  x   /  ALT  x   /  AlkPhos  x   05-21      Culture - Acid Fast - Body Fluid w/Smear (collected 20 May 2019 18:27)  Source: .Body Fluid    Culture - Abscess with Gram Stain (collected 20 May 2019 12:16)  Source: .Abscess  Gram Stain (20 May 2019 15:09):    Few WBC's    No organisms seen  Preliminary Report (22 May 2019 11:48):    Moderate Klebsiella pneumoniae    Few Enterococcus species Identification and susceptibility to follow.    Culture in progress  Organism: Klebsiella pneumoniae (22 May 2019 11:42)  Organism: Klebsiella pneumoniae (22 May 2019 11:42)    IMAGING: I personally reviewed the CT A/P, and I agree with the radiologist's interpretation as described below:  < from: CT Abdomen and Pelvis No Cont (05.18.19 @ 22:12) >  PROCEDURE:   CT of the Chest, abdomen and pelvis was performed without oral or intravenous contrast.    FINDINGS:    CHEST:  LUNG AND LARGE AIRWAYS: There is partial atelectasis of the right lower lobe and partial atelectasis of the left lower lobe. There is linear scarring or atelectasis in the left upper lobe. There is partial atelectasis involving the lingula.  PLEURA: Small bilateral pleural effusions, right larger than left.  VESSELS: Atherosclerotic changes in the aorta and coronary arteries  HEART: normal size. No pericardial effusion.  MEDIASTINUM AND GRACE: Again noted is a very large hiatal hernia containing an intrathoracic stomach as well as fat and transverse colon. This results in partial atelectasis involving both lower lobes.  CHEST WALL AND LOWER NECK: There is a 1.9 cm right lobe thyroid nodule.    ABDOMEN:  LIVER: within normal limits.  BILE DUCTS: There is mild intrahepatic biliary ductal dilatation and there are stones measuring up to 1.0 cm in the region of the common bile duct.  GALLBLADDER: Sludge and/or stones within the gallbladder.  PANCREAS: within normal limits.  SPLEEN: within normal limits.  ADRENALS: within normal limits.  KIDNEYS: There is no hydronephrosis bilaterally. There is a subcentimeter hyperattenuating focus which may be a hemorrhagic cyst in the left kidney. The right kidney is displaced anteriorly and surrounded by a large amount of fluid in the perinephric space. This fluid is simple in appearance and extends medially to abut the IVC and second portion of the duodenum. The etiology for this is not determined but could be related to prior calyceal rupture of the right kidney and urinoma collection. In addition, a retroperitoneal process in the abdomen which tracked into the right pararenal space is also in the differential diagnosis.  This fluid collection measures approximately 9.2 x 5.9 cm and extends for 19 cm in craniocaudal dimension.    PELVIS:  REPRODUCTIVE ORGANS: Bulky appearing uterus.  URETERS: within normal limits.  BLADDER: Distended.    BOWEL: Colonic diverticulosis. Partial right colon resection. No bowel obstruction.  PERITONEUM: no ascites or free air, no fluid collection.  VESSELS: Atherosclerotic changes  RETROPERITONEUM: No enlarged retroperitoneal or pelvic nodes.  ABDOMINAL WALL: within normal limits.  BONES: within normal limits.    IMPRESSION: Interval development of partial atelectasis involving both lower lobes. Again noted is a very large hiatal hernia with an intrathoracic stomach and herniation of mesenteric fat and transverse colon. Gallstones and gallbladder sludge. Interval development of common bile duct stones and mild intrahepatic biliary ductal dilatation.    Interval development of a very large right perinephric fluid collection of indeterminant etiology. The fluid appears simple and could represent the sequela of prior right renal calyceal rupture (urinoma) or represent fluid that has collected from a retroperitoneal process in the upper abdomen.    Bulky appearing uterus. Further evaluation with pelvic sonography is recommended to evaluate for myomas when feasible.    1.9 right lobe thyroid nodule.     < end of copied text >

## 2019-05-22 NOTE — PROGRESS NOTE ADULT - SUBJECTIVE AND OBJECTIVE BOX
INTERVAL HPI/OVERNIGHT EVENTS:    No acute overnight events reported. Patient denying abdominal pain. Right IR placed SEAN drain with green succus output. WBC downtrending yesterday 11.2 down from 14.6, pending today’s labs. Patient tolerating diet with no major complaints at this moment. Patient denies SOB, chest pain, nausea, vomiting, fevers, chills nor diarrhea.        MEDICATIONS  (STANDING):  atorvastatin 10 milliGRAM(s) Oral at bedtime  dextrose 5% + sodium chloride 0.45% 1000 milliLiter(s) (100 mL/Hr) IV Continuous <Continuous>  ertapenem  IVPB      ertapenem  IVPB 500 milliGRAM(s) IV Intermittent every 24 hours  fentaNYL   Patch  12 MICROgram(s)/Hr 1 Patch Transdermal every 72 hours  heparin  Injectable 5000 Unit(s) SubCutaneous every 12 hours  lidocaine   Patch 1 Patch Transdermal daily  montelukast 10 milliGRAM(s) Oral daily  pantoprazole    Tablet 40 milliGRAM(s) Oral before breakfast  saccharomyces boulardii 250 milliGRAM(s) Oral two times a day  senna 2 Tablet(s) Oral at bedtime  tamsulosin 0.4 milliGRAM(s) Oral at bedtime    MEDICATIONS  (PRN):  acetaminophen   Tablet .. 650 milliGRAM(s) Oral every 6 hours PRN Temp greater or equal to 38C (100.4F), Mild Pain (1 - 3)  HYDROmorphone  Injectable 0.5 milliGRAM(s) IV Push every 4 hours PRN Severe Pain (7 - 10)  ondansetron Injectable 4 milliGRAM(s) IV Push every 6 hours PRN Nausea and/or Vomiting  polyethylene glycol 3350 17 Gram(s) Oral daily PRN Constipation  traMADol 25 milliGRAM(s) Oral every 4 hours PRN Moderate Pain (4 - 6)      Vital Signs Last 24 Hrs  T(C): 36.6 (21 May 2019 23:16), Max: 37.1 (21 May 2019 19:19)  T(F): 97.8 (21 May 2019 23:16), Max: 98.7 (21 May 2019 19:19)  HR: 88 (21 May 2019 23:16) (81 - 96)  BP: 123/69 (21 May 2019 23:16) (111/68 - 123/77)  BP(mean): --  RR: 18 (21 May 2019 23:16) (18 - 19)  SpO2: 96% (21 May 2019 23:16) (93% - 96%)    PHYSICAL EXAM:  General: No acute distress  Respiratory: Nonlabored  Cardiovascular: S1/S2  Abdominal: Soft, nontender, No guarding or rebound. 10f drain in place to right upper quadrant, draining greenish fluid  : Smith in place  Extremities: Warm  Vascular: Radial pulse 2+, bilaterally      I&O's Detail    20 May 2019 07:01  -  21 May 2019 07:00  --------------------------------------------------------  IN:    dextrose 5% + sodium chloride 0.45%: 1300 mL  Total IN: 1300 mL    OUT:    Bulb: 430 mL    Indwelling Catheter - Urethral: 700 mL    Intermittent Catheterization - Urethral: 450 mL  Total OUT: 1580 mL    Total NET: -280 mL      21 May 2019 07:01  -  22 May 2019 04:40  --------------------------------------------------------  IN:    dextrose 5% + sodium chloride 0.45%: 700 mL  Total IN: 700 mL    OUT:    Bulb: 110 mL    Indwelling Catheter - Urethral: 400 mL  Total OUT: 510 mL    Total NET: 190 mL          LABS:                        11.2   11.2  )-----------( 203      ( 21 May 2019 08:02 )             32.9     05-21    141  |  108<H>  |  76.0<H>  ----------------------------<  148<H>  3.5   |  19.0<L>  |  2.15<H>    Ca    10.1      21 May 2019 08:02    TPro  x   /  Alb  x   /  TBili  0.8  /  DBili  0.6<H>  /  AST  x   /  ALT  x   /  AlkPhos  x   05-21          RADIOLOGY & ADDITIONAL STUDIES:

## 2019-05-22 NOTE — CONSULT NOTE ADULT - CONSULT REQUESTED DATE/TIME
16-May-2019 16:07
17-May-2019 17:39
19-May-2019 08:04
21-May-2019 09:50
21-May-2019 17:49
22-May-2019 12:12
17-May-2019 12:00

## 2019-05-22 NOTE — CONSULT NOTE ADULT - REASON FOR ADMISSION
RUQ and Right Shoulder Pain

## 2019-05-22 NOTE — PROGRESS NOTE ADULT - ASSESSMENT
MARY due to renal hypoperfusion==> continues to resolve  - cont IVF and monitor labs  - avoid potential nephrotoxins    R perinephric collection: s/p drainage 5/20/19  (?) GI microperforation and abcess  - continue IV antibiotics and conservative care  - await further recommendations from surgery    Hypercalcemia: (?) etiology  - check iPTH

## 2019-05-22 NOTE — PROGRESS NOTE ADULT - SUBJECTIVE AND OBJECTIVE BOX
[facility]  INFECTIOUS DISEASES AND INTERNAL MEDICINE at [place]  =======================================================  Elio James MD [facility]   Sanjay Rendon MD  Diplomates American Board of Internal Medicine and Infectious Diseases  Telephone [phone]  Fax            [phone]  =======================================================    [id number]  BETY TRINIDAD   follow up for:  fevers, right lower abd pain  patient seen and examined.     s/p placement of IR drain in the RIGHT abd  still with copious green fluid   GI consult appreciated    ===================================================  REVIEW OF SYSTEMS:  as above  all other ROS negative    =======================================================  Allergies  No Known Allergies    Antibiotics:  ertapenem  IVPB      ertapenem  IVPB 500 milliGRAM(s) IV Intermittent every 24 hours    ======================================================  Physical Exam:  ============  T(F): 97.5 (22 May 2019 08:25), Max: 98.7 (21 May 2019 19:19)  HR: 90 (22 May 2019 08:25)  BP: 127/84 (22 May 2019 08:25)  RR: 18 (22 May 2019 08:25)  SpO2: 95% (22 May 2019 08:25) (94% - 96%)  temp max in last 48H T(F): , Max: 98.7 (05-21-19 @ 19:19)    General:  No acute distress. FRAIL    Eye: Pupils are equal, round and reactive to light, Extraocular movements are intact, Normal conjunctiva.  HENT: Normocephalic, Oral mucosa is dry  Neck: Supple, No lymphadenopathy.  Respiratory: Lungs are clear to auscultation, Respirations are non-labored.  Cardiovascular: Normal rate, Regular rhythm,   Gastrointestinal: Soft,  + bowel sounds, + right sided drain SEAN in place with copious green fluid  Genitourinary: No costovertebral angle tenderness.  Lymphatics: No lymphadenopathy neck,   Musculoskeletal: Normal range of motion, Normal strength.  Integumentary: No rash.  Neurologic: Alert, Oriented, No focal deficits, Cranial Nerves II-XII are grossly intact.  Psychiatric: Appropriate mood & affect.      =======================================================  Labs:                        11.6   10.6  )-----------( 231      ( 22 May 2019 11:26 )             34.8       WBC Count: 10.6 K/uL (05-22-19 @ 11:26)  WBC Count: 11.2 K/uL (05-21-19 @ 08:02)  WBC Count: 14.6 K/uL (05-20-19 @ 10:37)  WBC Count: 16.5 K/uL (05-18-19 @ 18:15)      05-22    143  |  108<H>  |  57.0<H>  ----------------------------<  145<H>  3.8   |  23.0  |  1.50<H>    Ca    10.1      22 May 2019 11:26  Mg     1.4     05-22    TPro  x   /  Alb  x   /  TBili  0.8  /  DBili  0.6<H>  /  AST  x   /  ALT  x   /  AlkPhos  x   05-21      Culture - Acid Fast - Body Fluid w/Smear (collected 05-20-19 @ 18:27)  Source: .Body Fluid    Culture - Abscess with Gram Stain (collected 05-20-19 @ 12:16)  Source: .Abscess  Gram Stain (05-20-19 @ 15:09):    Few WBC's    No organisms seen  Organism: Klebsiella pneumoniae (05-22-19 @ 11:42)  Organism: Klebsiella pneumoniae (05-22-19 @ 11:42)    Sensitivities:      -  Amikacin: S <=16      -  Ampicillin: R >16 These ampicillin results predict results for amoxicillin      -  Ampicillin/Sulbactam: S <=8/4      -  Aztreonam: S <=4      -  Cefazolin: S <=8      -  Cefepime: S <=4      -  Cefoxitin: S <=8      -  Ceftriaxone: S <=1 Enterobacter, Citrobacter, and Serratia may develop resistance during prolonged therapy      -  Ciprofloxacin: S <=1      -  Ertapenem: S <=1      -  Gentamicin: S <=4      -  Imipenem: S <=1      -  Levofloxacin: S <=2      -  Meropenem: S <=1      -  Piperacillin/Tazobactam: S <=16      -  Tobramycin: S <=4      -  Trimethoprim/Sulfamethoxazole: S <=2/38      Method Type: PEPE    Culture - Blood (collected 05-16-19 @ 20:37)  Source: .Blood  Final Report (05-21-19 @ 21:00):    No growth at 5 days.    Culture - Blood (collected 05-16-19 @ 20:37)  Source: .Blood  Final Report (05-21-19 @ 21:00):    No growth at 5 days.    Culture - Urine (collected 05-16-19 @ 17:53)  Source: .Urine  Final Report (05-17-19 @ 17:09):    <10,000 CFU/ml Corynebacterium species

## 2019-05-22 NOTE — PROGRESS NOTE ADULT - ASSESSMENT
This 89 years old male with PMH of Colon Cancer s/p Resection, Hiatal Hernia, HTN and HLD brought by family with right upper quadrant and right shoulder pain    had sonographic moy sign, but negative HIDA  right sided flank pain  CT scan with large collection,   s/p placement of IR drain    Cultures sent  GNR --> klebsiella, also enterococcus species  repeat CT pending    - on Ertapenem    - follow up all outstanding cultures  - follow up repeat CT scan

## 2019-05-22 NOTE — PROGRESS NOTE ADULT - SUBJECTIVE AND OBJECTIVE BOX
Subjective:89yFemale with right retroperitoneal collection, s/p IR drainage 5/20.  Pt feels better this am, no complaints of pain, nausea, vomiting.  Drain initially was putting out greenish colored fluid, now thick yellow fluid- enteric looking.    Smith: clear, yellow urine    Vital Signs Last 24 Hrs  T(C): 36.4 (22 May 2019 08:25), Max: 37.1 (21 May 2019 19:19)  T(F): 97.5 (22 May 2019 08:25), Max: 98.7 (21 May 2019 19:19)  HR: 90 (22 May 2019 08:25) (88 - 96)  BP: 127/84 (22 May 2019 08:25) (120/71 - 127/84)  BP(mean): --  RR: 18 (22 May 2019 08:25) (18 - 19)  SpO2: 95% (22 May 2019 08:25) (94% - 96%)  I&O's Detail    21 May 2019 07:01  -  22 May 2019 07:00  --------------------------------------------------------  IN:    dextrose 5% + sodium chloride 0.45%: 700 mL  Total IN: 700 mL    OUT:    Bulb: 110 mL    Indwelling Catheter - Urethral: 400 mL  Total OUT: 510 mL -  was greenish fluid, now thick yellow fluid- enteric looking    Total NET: 190 mL          Labs:                        11.2   11.2  )-----------( 203      ( 21 May 2019 08:02 )             32.9     05-21    141  |  108<H>  |  76.0<H>  ----------------------------<  148<H>  3.5   |  19.0<L>  |  2.15<H>    Ca    10.1      21 May 2019 08:02    TPro  x   /  Alb  x   /  TBili  0.8  /  DBili  0.6<H>  /  AST  x   /  ALT  x   /  AlkPhos  x   05-21      Culture Results:   Moderate Gram Negative Rods Identification and susceptibility to follow.  Culture in progress (05.20.19 @ 12:16)        Culture - Acid Fast - Body Fluid w/Smear (collected 20 May 2019 18:27)  Source: .Body Fluid    Culture - Abscess with Gram Stain (collected 20 May 2019 12:16)  Source: .Abscess  Gram Stain (20 May 2019 15:09):    Few WBC's    No organisms seen  Preliminary Report (21 May 2019 12:38):    Moderate Gram Negative Rods Identification and susceptibility to follow.    Culture in progress

## 2019-05-22 NOTE — CONSULT NOTE ADULT - CONSULT REASON
Right Shoulder Pain
? Biloma
Abdominal pain
GOC
MARY
Retroperitoneal Fluid Collection
right side abd pain

## 2019-05-22 NOTE — CONSULT NOTE ADULT - ASSESSMENT
In short, this is a 89-year-old woman who presented several days ago with worsening RUQ pain, was found to have a retroperitoneal fluid collection that was treated with a CT-guided drain placement by interventional radiology with significant improvement in her symptoms.  The fluid from the drain is green with particulate matter, consistent with succus/stool.     Recommendations:  - Repeat CT A/P with oral contrast  - Continue antibiosis as per ID  - Continue SEAN drainage and monitor output    Thank you for the consult.  Please do not hesitate to call with any questions or concerns.    RYAN Kitchen MD  [facility]  Division of Gastroenterology  Tel [phone]  Fax [phone]  05-22-19 @ 12:35

## 2019-05-22 NOTE — PROGRESS NOTE ADULT - SUBJECTIVE AND OBJECTIVE BOX
CC: RUQ and Right Shoulder Pain (22 May 2019 10:14)    HPI:  89 years old male with PMH of Colon Cancer s/p Resection, Hiatal Hernia, HTN and HLD brought by family with right upper quadrant and right shoulder pain.     INTERVAL HPI/OVERNIGHT EVENTS: Patient seen and examined lying in bed.  Patient denies any headache, dizziness, SOB, CP, abdominal pain, nausea, vomiting, dysuria.  Other ROS reviewed and are negative.    Vital Signs Last 24 Hrs  T(C): 36.4 (22 May 2019 08:25), Max: 37.1 (21 May 2019 19:19)  T(F): 97.5 (22 May 2019 08:25), Max: 98.7 (21 May 2019 19:19)  HR: 90 (22 May 2019 08:25) (88 - 96)  BP: 127/84 (22 May 2019 08:25) (120/71 - 127/84)  BP(mean): --  RR: 18 (22 May 2019 08:25) (18 - 19)  SpO2: 95% (22 May 2019 08:25) (94% - 96%)  I&O's Detail    21 May 2019 07:01  -  22 May 2019 07:00  --------------------------------------------------------  IN:    dextrose 5% + sodium chloride 0.45%: 700 mL  Total IN: 700 mL    OUT:    Bulb: 110 mL    Indwelling Catheter - Urethral: 400 mL  Total OUT: 510 mL    Total NET: 190 mL      PHYSICAL EXAM:  GENERAL: NAD  HEAD:  Atraumatic, Normocephalic  NECK: Supple, No JVD, Normal thyroid  NERVOUS SYSTEM:  Alert & Oriented X3, Good concentration; Motor Strength 5/5 B/L upper and lower extremities  CHEST/LUNG: Clear to auscultation bilaterally; No rales, rhonchi, wheezing, or rubs  HEART: Regular rate and rhythm; No murmurs, rubs, or gallops  ABDOMEN: Soft, Nontender, Nondistended; Bowel sounds present; SEAN with thick green drainage; (+) rose  EXTREMITIES:  2+ Peripheral Pulses, No clubbing, cyanosis, or edema                                11.2   11.2  )-----------( 203      ( 21 May 2019 08:02 )             32.9     21 May 2019 08:02    141    |  108    |  76.0   ----------------------------<  148    3.5     |  19.0   |  2.15     Ca    10.1       21 May 2019 08:02    TPro  x      /  Alb  x      /  TBili  0.8    /  DBili  0.6    /  AST  x      /  ALT  x      /  AlkPhos  x      21 May 2019 08:02      Hemoglobin A1C, Whole Blood: 5.2 % (05-20-19 @ 10:37)    Culture - Acid Fast - Body Fluid w/Smear (05.20.19 @ 18:27)    Specimen Source: .Body Fluid    Acid Fast Bacilli Smear:   No acid fast bacilli seen by fluorochrome stain    Culture - Abscess with Gram Stain (05.20.19 @ 12:16)    Gram Stain:   Few WBC's  No organisms seen    -  Amikacin: S <=16    -  Ampicillin: R >16 These ampicillin results predict results for amoxicillin    -  Ampicillin/Sulbactam: S <=8/4    -  Aztreonam: S <=4    -  Cefazolin: S <=8    -  Cefepime: S <=4    -  Cefoxitin: S <=8    -  Ceftriaxone: S <=1 Enterobacter, Citrobacter, and Serratia may develop resistance during prolonged therapy    -  Ciprofloxacin: S <=1    -  Ertapenem: S <=1    -  Gentamicin: S <=4    -  Imipenem: S <=1    -  Levofloxacin: S <=2    -  Meropenem: S <=1    -  Piperacillin/Tazobactam: S <=16    -  Tobramycin: S <=4    -  Trimethoprim/Sulfamethoxazole: S <=2/38    Specimen Source: .Abscess    Culture Results:   Moderate Gram Negative Rods Identification and susceptibility to follow.  Culture in progress    Organism Identification: Klebsiella pneumoniae    Organism: Klebsiella pneumoniae    Method Type: PEPE        MEDICATIONS  (STANDING):  atorvastatin 10 milliGRAM(s) Oral at bedtime  dextrose 5% + sodium chloride 0.45% 1000 milliLiter(s) (100 mL/Hr) IV Continuous <Continuous>  ertapenem  IVPB      ertapenem  IVPB 500 milliGRAM(s) IV Intermittent every 24 hours  fentaNYL   Patch  12 MICROgram(s)/Hr 1 Patch Transdermal every 72 hours  heparin  Injectable 5000 Unit(s) SubCutaneous every 12 hours  lidocaine   Patch 1 Patch Transdermal daily  montelukast 10 milliGRAM(s) Oral daily  pantoprazole  Injectable 40 milliGRAM(s) IV Push daily  saccharomyces boulardii 250 milliGRAM(s) Oral two times a day  senna 2 Tablet(s) Oral at bedtime  tamsulosin 0.4 milliGRAM(s) Oral at bedtime    MEDICATIONS  (PRN):  acetaminophen   Tablet .. 650 milliGRAM(s) Oral every 6 hours PRN Temp greater or equal to 38C (100.4F), Mild Pain (1 - 3)  HYDROmorphone  Injectable 0.5 milliGRAM(s) IV Push every 4 hours PRN Severe Pain (7 - 10)  ondansetron Injectable 4 milliGRAM(s) IV Push every 6 hours PRN Nausea and/or Vomiting  polyethylene glycol 3350 17 Gram(s) Oral daily PRN Constipation  traMADol 25 milliGRAM(s) Oral every 4 hours PRN Moderate Pain (4 - 6)

## 2019-05-22 NOTE — PROGRESS NOTE ADULT - ATTENDING COMMENTS
Seen and examined.      Reports feeling well.  Tolerating diet.    NAD  Awake and alert  non labored resp  abd soft.    Reviewed imaging, labs personally.    Given location of collection, pt exam and current complaints it seems very unlikely that this is an enteric source, unless duodenal, but given the bilirubin level in the drained fluid is so low, it is very unlikely that this is a duodenal source.  CT with enteric contrast may not provide the answer as a duodenal perforation may already be sealed.  Would check creatinine on the draining fluid.  If significantly higher than serum creat would confirm that this is an infected urinoma which seems to be the most likely dx at this time.  If fluid creat similar to serum would then give higher consideration to intestinal source.

## 2019-05-23 NOTE — PROGRESS NOTE ADULT - SUBJECTIVE AND OBJECTIVE BOX
Patient is a 89y old  Female who presents with a chief complaint of RUQ and Right Shoulder Pain (23 May 2019 12:22)      HPI:  89 years old male with PMH of Colon Cancer s/p Resection, Hiatal Hernia, HTN and HLD brought by family with right upper quadrant and right shoulder pain.  Ct showed Right perineprhic fluid collection. IR placed drain which is draining yellow colored Fluid.  No fevers. WBC normal. HIDA negative, LFT normal. Fluid analysis was negative for bile.  CT yesterday showed no bowel perforation    REVIEW OF SYSTEMS:  Constitutional: No fever, weight loss or fatigue  ENMT:  No difficulty hearing, tinnitus, vertigo; No sinus or throat pain  Respiratory: No cough, wheezing, chills or hemoptysis  Cardiovascular: No chest pain, palpitations, dizziness or leg swelling  Gastrointestinal: +abdominal  pain. No nausea, vomiting or hematemesis; No diarrhea or constipation. No melena or hematochezia.  Skin: No itching, burning, rashes or lesions   Musculoskeletal: No joint pain or swelling; No muscle, back or extremity pain    PAST MEDICAL & SURGICAL HISTORY:  HLD (hyperlipidemia)  Hiatal hernia  Colon cancer  HTN (hypertension)  H/O hemicolectomy      FAMILY HISTORY:  No pertinent family history in first degree relatives      SOCIAL HISTORY:  Smoking Status: [ ] Current, [ ] Former, [ ] Never  Pack Years:  [  ] EtOH-no  [  ] IVDA    MEDICATIONS:  MEDICATIONS  (STANDING):  atorvastatin 10 milliGRAM(s) Oral at bedtime  dextrose 5% + sodium chloride 0.45% 1000 milliLiter(s) (100 mL/Hr) IV Continuous <Continuous>  ertapenem  IVPB      ertapenem  IVPB 500 milliGRAM(s) IV Intermittent every 24 hours  fentaNYL   Patch  12 MICROgram(s)/Hr 1 Patch Transdermal every 72 hours  heparin  Injectable 5000 Unit(s) SubCutaneous every 12 hours  lidocaine   Patch 1 Patch Transdermal daily  montelukast 10 milliGRAM(s) Oral daily  pantoprazole  Injectable 40 milliGRAM(s) IV Push daily  saccharomyces boulardii 250 milliGRAM(s) Oral two times a day  senna 2 Tablet(s) Oral at bedtime  tamsulosin 0.4 milliGRAM(s) Oral at bedtime    MEDICATIONS  (PRN):  acetaminophen   Tablet .. 650 milliGRAM(s) Oral every 6 hours PRN Temp greater or equal to 38C (100.4F), Mild Pain (1 - 3)  HYDROmorphone  Injectable 0.5 milliGRAM(s) IV Push every 4 hours PRN Severe Pain (7 - 10)  ondansetron Injectable 4 milliGRAM(s) IV Push every 6 hours PRN Nausea and/or Vomiting  polyethylene glycol 3350 17 Gram(s) Oral daily PRN Constipation  traMADol 25 milliGRAM(s) Oral every 4 hours PRN Moderate Pain (4 - 6)      Allergies    No Known Allergies    Intolerances        Vital Signs Last 24 Hrs  T(C): 36.2 (23 May 2019 08:09), Max: 36.5 (22 May 2019 23:20)  T(F): 97.2 (23 May 2019 08:09), Max: 97.7 (22 May 2019 23:20)  HR: 91 (23 May 2019 08:09) (84 - 91)  BP: 118/62 (23 May 2019 08:09) (108/71 - 118/62)  BP(mean): --  RR: 18 (23 May 2019 08:09) (18 - 18)  SpO2: 95% (23 May 2019 08:09) (95% - 95%)    05-22 @ 07:01  -  05-23 @ 07:00  --------------------------------------------------------  IN: 0 mL / OUT: 660 mL / NET: -660 mL          PHYSICAL EXAM:    General: Well developed; well nourished; in no acute distress  HEENT: MMM, conjunctiva and sclera clear  H- RRR  L- CTA  Gastrointestinal: Soft, non-tender non-distended; Normal bowel sounds; No rebound or guarding- IR drain - yellow fluid  Extremities: Normal range of motion, No clubbing, cyanosis or edema  Neurological: Alert and oriented x3  Skin: Warm and dry. No obvious rash      LABS:                        11.6   10.6  )-----------( 231      ( 22 May 2019 11:26 )             34.8     23 May 2019 08:23    143    |  107    |  53.0   ----------------------------<  99     3.8     |  25.0   |  1.28     Ca    10.1       23 May 2019 08:23  Phos  3.0       23 May 2019 08:23  Mg     1.9       23 May 2019 08:23    TPro  5.2    /  Alb  2.1    /  TBili  0.8    /  DBili  x      /  AST  13     /  ALT  10     /  AlkPhos  87     / Amylase x      /Lipase x      23 May 2019 08:23              RADIOLOGY & ADDITIONAL STUDIES:     < from: CT Abdomen and Pelvis w/ Oral Cont (05.22.19 @ 17:36) >  MPRESSION:     No significant change in large right perinephric collection with pigtail   drainage catheter in place. No extraluminal oral contrast.            < end of copied text >

## 2019-05-23 NOTE — PROGRESS NOTE ADULT - SUBJECTIVE AND OBJECTIVE BOX
INTERVAL HPI/OVERNIGHT EVENTS/SUBJECTIVE:  Patient continues to be free of abdominal pain. IR Drain in place with greenish output. afebrile.     Vital Signs Last 24 Hrs  T(C): 36.2 (23 May 2019 08:09), Max: 36.5 (22 May 2019 23:20)  T(F): 97.2 (23 May 2019 08:09), Max: 97.7 (22 May 2019 23:20)  HR: 91 (23 May 2019 08:09) (84 - 91)  BP: 118/62 (23 May 2019 08:09) (108/71 - 118/62)  BP(mean): --  ABP: --  ABP(mean): --  RR: 18 (23 May 2019 08:09) (18 - 18)  SpO2: 95% (23 May 2019 08:09) (95% - 95%)      I&O's Detail    22 May 2019 07:01  -  23 May 2019 07:00  --------------------------------------------------------  IN:  Total IN: 0 mL    OUT:    Bulb: 260 mL    Indwelling Catheter - Urethral: 400 mL  Total OUT: 660 mL    Total NET: -660 mL      MEDICATIONS  (STANDING):  atorvastatin 10 milliGRAM(s) Oral at bedtime  dextrose 5% + sodium chloride 0.45% 1000 milliLiter(s) (100 mL/Hr) IV Continuous <Continuous>  ertapenem  IVPB      ertapenem  IVPB 500 milliGRAM(s) IV Intermittent every 24 hours  fentaNYL   Patch  12 MICROgram(s)/Hr 1 Patch Transdermal every 72 hours  heparin  Injectable 5000 Unit(s) SubCutaneous every 12 hours  lidocaine   Patch 1 Patch Transdermal daily  montelukast 10 milliGRAM(s) Oral daily  pantoprazole  Injectable 40 milliGRAM(s) IV Push daily  saccharomyces boulardii 250 milliGRAM(s) Oral two times a day  senna 2 Tablet(s) Oral at bedtime  tamsulosin 0.4 milliGRAM(s) Oral at bedtime    MEDICATIONS  (PRN):  acetaminophen   Tablet .. 650 milliGRAM(s) Oral every 6 hours PRN Temp greater or equal to 38C (100.4F), Mild Pain (1 - 3)  HYDROmorphone  Injectable 0.5 milliGRAM(s) IV Push every 4 hours PRN Severe Pain (7 - 10)  ondansetron Injectable 4 milliGRAM(s) IV Push every 6 hours PRN Nausea and/or Vomiting  polyethylene glycol 3350 17 Gram(s) Oral daily PRN Constipation  traMADol 25 milliGRAM(s) Oral every 4 hours PRN Moderate Pain (4 - 6)      NUTRITION/IVF:    MISC:  IR Drain    PHYSICAL EXAM:    Gen: well appearing female    Neurological: alert and able to make needs known    Pulmonary: unlabored    Gastrointestinal: soft nt,nd, no guarding or rebound. IR drain with thick green viscous liquid    Genitourinary: voiding    Extremities: no c/c/e    Skin: intact      LABS:  CBC Full  -  ( 22 May 2019 11:26 )  WBC Count : 10.6 K/uL  RBC Count : 3.76 M/uL  Hemoglobin : 11.6 g/dL  Hematocrit : 34.8 %  Platelet Count - Automated : 231 K/uL  Mean Cell Volume : 92.6 fl  Mean Cell Hemoglobin : 30.9 pg  Mean Cell Hemoglobin Concentration : 33.3 g/dL  Auto Neutrophil # : x  Auto Lymphocyte # : x  Auto Monocyte # : x  Auto Eosinophil # : x  Auto Basophil # : x  Auto Neutrophil % : x  Auto Lymphocyte % : x  Auto Monocyte % : x  Auto Eosinophil % : x  Auto Basophil % : x    05-23    143  |  107  |  53.0<H>  ----------------------------<  99  3.8   |  25.0  |  1.28    Ca    10.1      23 May 2019 08:23  Phos  3.0     05-23  Mg     1.9     05-23    TPro  5.2<L>  /  Alb  2.1<L>  /  TBili  0.8  /  DBili  x   /  AST  13  /  ALT  10  /  AlkPhos  87  05-23        RECENT CULTURES:  05-20 .Body Fluid XXXX XXXX   Culture is being performed.    05-20 .Abscess Klebsiella pneumoniae   Few WBC's  No organisms seen   Moderate Klebsiella pneumoniae  Few Enterococcus species Identification and susceptibility to follow.  Culture in progress    05-16 .Blood XXXX XXXX   No growth at 5 days.    05-16 .Urine XXXX XXXX   <10,000 CFU/ml Corynebacterium species        LIVER FUNCTIONS - ( 23 May 2019 08:23 )  Alb: 2.1 g/dL / Pro: 5.2 g/dL / ALK PHOS: 87 U/L / ALT: 10 U/L / AST: 13 U/L / GGT: x               CAPILLARY BLOOD GLUCOSE      RADIOLOGY & ADDITIONAL STUDIES:    ASSESSMENT/PLAN:  89yFemale presenting with likely urinoma/chf exacerbation    -Consider sending Drain fluid for creatinine to r/o urinoma.   -serial abdominal exams  -pain is controlled  -diet advancement  -plan per primary, will follow. INTERVAL HPI/OVERNIGHT EVENTS/SUBJECTIVE:  Patient continues to be free of abdominal pain. IR Drain in place with greenish output. afebrile.     Vital Signs Last 24 Hrs  T(C): 36.2 (23 May 2019 08:09), Max: 36.5 (22 May 2019 23:20)  T(F): 97.2 (23 May 2019 08:09), Max: 97.7 (22 May 2019 23:20)  HR: 91 (23 May 2019 08:09) (84 - 91)  BP: 118/62 (23 May 2019 08:09) (108/71 - 118/62)  BP(mean): --  ABP: --  ABP(mean): --  RR: 18 (23 May 2019 08:09) (18 - 18)  SpO2: 95% (23 May 2019 08:09) (95% - 95%)      I&O's Detail    22 May 2019 07:01  -  23 May 2019 07:00  --------------------------------------------------------  IN:  Total IN: 0 mL    OUT:    Bulb: 260 mL    Indwelling Catheter - Urethral: 400 mL  Total OUT: 660 mL    Total NET: -660 mL      MEDICATIONS  (STANDING):  atorvastatin 10 milliGRAM(s) Oral at bedtime  dextrose 5% + sodium chloride 0.45% 1000 milliLiter(s) (100 mL/Hr) IV Continuous <Continuous>  ertapenem  IVPB      ertapenem  IVPB 500 milliGRAM(s) IV Intermittent every 24 hours  fentaNYL   Patch  12 MICROgram(s)/Hr 1 Patch Transdermal every 72 hours  heparin  Injectable 5000 Unit(s) SubCutaneous every 12 hours  lidocaine   Patch 1 Patch Transdermal daily  montelukast 10 milliGRAM(s) Oral daily  pantoprazole  Injectable 40 milliGRAM(s) IV Push daily  saccharomyces boulardii 250 milliGRAM(s) Oral two times a day  senna 2 Tablet(s) Oral at bedtime  tamsulosin 0.4 milliGRAM(s) Oral at bedtime    MEDICATIONS  (PRN):  acetaminophen   Tablet .. 650 milliGRAM(s) Oral every 6 hours PRN Temp greater or equal to 38C (100.4F), Mild Pain (1 - 3)  HYDROmorphone  Injectable 0.5 milliGRAM(s) IV Push every 4 hours PRN Severe Pain (7 - 10)  ondansetron Injectable 4 milliGRAM(s) IV Push every 6 hours PRN Nausea and/or Vomiting  polyethylene glycol 3350 17 Gram(s) Oral daily PRN Constipation  traMADol 25 milliGRAM(s) Oral every 4 hours PRN Moderate Pain (4 - 6)      NUTRITION/IVF: regular    MISC:  IR Drain    PHYSICAL EXAM:    Gen: well appearing female    Neurological: alert and able to make needs known    Pulmonary: unlabored    Gastrointestinal: soft nt,nd, no guarding or rebound. IR drain with thick green viscous liquid    Genitourinary: voiding    Extremities: no c/c/e    Skin: intact      LABS:  CBC Full  -  ( 22 May 2019 11:26 )  WBC Count : 10.6 K/uL  RBC Count : 3.76 M/uL  Hemoglobin : 11.6 g/dL  Hematocrit : 34.8 %  Platelet Count - Automated : 231 K/uL  Mean Cell Volume : 92.6 fl  Mean Cell Hemoglobin : 30.9 pg  Mean Cell Hemoglobin Concentration : 33.3 g/dL  Auto Neutrophil # : x  Auto Lymphocyte # : x  Auto Monocyte # : x  Auto Eosinophil # : x  Auto Basophil # : x  Auto Neutrophil % : x  Auto Lymphocyte % : x  Auto Monocyte % : x  Auto Eosinophil % : x  Auto Basophil % : x    05-23    143  |  107  |  53.0<H>  ----------------------------<  99  3.8   |  25.0  |  1.28    Ca    10.1      23 May 2019 08:23  Phos  3.0     05-23  Mg     1.9     05-23    TPro  5.2<L>  /  Alb  2.1<L>  /  TBili  0.8  /  DBili  x   /  AST  13  /  ALT  10  /  AlkPhos  87  05-23        RECENT CULTURES:  05-20 .Body Fluid XXXX XXXX   Culture is being performed.    05-20 .Abscess Klebsiella pneumoniae   Few WBC's  No organisms seen   Moderate Klebsiella pneumoniae  Few Enterococcus species Identification and susceptibility to follow.  Culture in progress    05-16 .Blood XXXX XXXX   No growth at 5 days.    05-16 .Urine XXXX XXXX   <10,000 CFU/ml Corynebacterium species        LIVER FUNCTIONS - ( 23 May 2019 08:23 )  Alb: 2.1 g/dL / Pro: 5.2 g/dL / ALK PHOS: 87 U/L / ALT: 10 U/L / AST: 13 U/L / GGT: x               CAPILLARY BLOOD GLUCOSE      RADIOLOGY & ADDITIONAL STUDIES:    ASSESSMENT/PLAN:  89yFemale presenting with likely urinoma/chf exacerbation    -Consider sending Drain fluid for creatinine to r/o urinoma.   -serial abdominal exams  -pain is controlled  -diet advancement  -plan per primary, will follow.

## 2019-05-23 NOTE — PROGRESS NOTE ADULT - SUBJECTIVE AND OBJECTIVE BOX
NEPHROLOGY INTERVAL HPI/OVERNIGHT EVENTS:  pt resting comfortably when seen earlier  offering no acute complaints    MEDICATIONS  (STANDING):  atorvastatin 10 milliGRAM(s) Oral at bedtime  dextrose 5% + sodium chloride 0.45% 1000 milliLiter(s) (100 mL/Hr) IV Continuous <Continuous>  ertapenem  IVPB      ertapenem  IVPB 500 milliGRAM(s) IV Intermittent every 24 hours  fentaNYL   Patch  12 MICROgram(s)/Hr 1 Patch Transdermal every 72 hours  heparin  Injectable 5000 Unit(s) SubCutaneous every 12 hours  lidocaine   Patch 1 Patch Transdermal daily  montelukast 10 milliGRAM(s) Oral daily  pantoprazole  Injectable 40 milliGRAM(s) IV Push daily  saccharomyces boulardii 250 milliGRAM(s) Oral two times a day  senna 2 Tablet(s) Oral at bedtime  tamsulosin 0.4 milliGRAM(s) Oral at bedtime    MEDICATIONS  (PRN):  acetaminophen   Tablet .. 650 milliGRAM(s) Oral every 6 hours PRN Temp greater or equal to 38C (100.4F), Mild Pain (1 - 3)  HYDROmorphone  Injectable 0.5 milliGRAM(s) IV Push every 4 hours PRN Severe Pain (7 - 10)  ondansetron Injectable 4 milliGRAM(s) IV Push every 6 hours PRN Nausea and/or Vomiting  polyethylene glycol 3350 17 Gram(s) Oral daily PRN Constipation  traMADol 25 milliGRAM(s) Oral every 4 hours PRN Moderate Pain (4 - 6)      Allergies    No Known Allergies        Vital Signs Last 24 Hrs  T(C): 36.2 (23 May 2019 08:09), Max: 36.5 (22 May 2019 23:20)  T(F): 97.2 (23 May 2019 08:09), Max: 97.7 (22 May 2019 23:20)  HR: 91 (23 May 2019 08:09) (84 - 91)  BP: 118/62 (23 May 2019 08:09) (108/71 - 118/62)  BP(mean): --  RR: 18 (23 May 2019 08:09) (18 - 18)  SpO2: 95% (23 May 2019 08:09) (95% - 95%)    PHYSICAL EXAM:  GENERAL: Frail, elderly  NECK: Supple, no jvd  NERVOUS SYSTEM:  Alert & Oriented X3; non focal  CHEST/LUNG: Clear bilaterally  HEART: Regular rate and rhythm; No rub  ABDOMEN: Soft, Nontender, Nondistended; +BS    LABS:                        11.6   10.6  )-----------( 231      ( 22 May 2019 11:26 )             34.8     05-23    143  |  107  |  53.0<H>  ----------------------------<  99  3.8   |  25.0  |  1.28    Creatinine, Serum: 1.50 mg/dL (05.22.19 @ 11:26)        Ca    10.1      23 May 2019 08:23  Phos  3.0     05-23  Mg     1.9     05-23  Albumin, Serum: 2.1 g/dL (05.23.19 @ 08:23)        TPro  5.2<L>  /  Alb  2.1<L>  /  TBili  0.8  /  DBili  x   /  AST  13  /  ALT  10  /  AlkPhos  87  05-23        Phosphorus Level, Serum: 3.0 mg/dL (05-23 @ 08:23)  Magnesium, Serum: 1.9 mg/dL (05-23 @ 08:23)  Magnesium, Serum: 1.4 mg/dL (05-22 @ 11:26)      RADIOLOGY & ADDITIONAL TESTS:

## 2019-05-23 NOTE — PROGRESS NOTE ADULT - ASSESSMENT
89 years old male with PMH of Colon Cancer s/p Resection, Hiatal Hernia, HTN and HLD brought by family with right upper quadrant and right shoulder pain. As per patient, pain started 5 days prior on back side of right chest and then radiated to right shoulder and right upper quadrant. She pulled her right shoulder while getting into her son's truck 5 days ago and she was attributing the pain to it.   It is associated with nausea and decrease appetite. For 3 days, pain was mostly in right upper quadrant and flank area and it is getting worse so she was brought to ER.  Pt admitted. HIDA scan negative for cholecystitis, US of renal with abnormalities on the right urethral kidney area , CT with possible perinephric collection/urinoma.  Urology consulted and recommended IR drainage of fluid collection.  Patient s/p IR drainage 5/20/19 with thick green drainage.  Awaiting culture results.    1- Flank pain/RUQ pain improving   CT scan with possible perinephric collection/ urinoma  s/p IR drainage and fluid cx with Klebsiella pneumoniae  Urology following.   Continue Invanz and ID following  on probiotics while on abx  leukocytosis; resolved  CT scan with po contrast negative for GI leak  Creatinine level of drain is pending, discussed with surgery and no intervention at this time    2- ARF on CRF unknown baseline   s/p iv hydration  Monitor BMP: improved  renal following    3- Moderate-severe protein calorie malnutrition   cont ensure with meals, Nutritional consult     4- Cholelithiasis   HIDA scan 5/17/19, no cholecystitis   asymptomatic    5- Prophylactic measure  DVT ppx: Heparin SW    DNR/DNI   PT consulted and recommended Home PT.  Disposition: Unclear date at this time.

## 2019-05-23 NOTE — PROGRESS NOTE ADULT - ATTENDING COMMENTS
Seen and examined on rounds this AM.    No complaints. tolerating diet.      NAD  abd soft, NT  murky purulent fluid from drain.      CT reviewed - no signs of enteric leak.    Recommend continuing drainage of perinephric abscess.  Abx.  Diet as tolerated.  Check creat level of drainage.  Discussed with hospitalist.      Revisit with pt at approx 1350  today to discuss current situation with daughter.  Updated as my current working dx, the remaining ddx, plan, no surgical intervention warranted at this time, Answered all questions to her satisfaction/.

## 2019-05-23 NOTE — PROGRESS NOTE ADULT - ASSESSMENT
MARY due to renal hypoperfusion==> continues to recovers  - avoid potential nephrotoxins  - monitor labs    R perinephric collection: s/p drainage 5/20/19  (?) GI microperforation and abcess  - continue IV antibiotics as per ID  - conservative care for now    Hypercalcemia: etiology unclear  Corrected serum Ca+ (for alb)= 11.6md/dL  - await iPTH  - avoid Ca+ supplements and Vit d  - follow labs; may need to consider treating if upward trend continues

## 2019-05-23 NOTE — PROGRESS NOTE ADULT - PROBLEM SELECTOR PLAN 1
IR drain in place  Does not appear to be from GI source . Surgery and urology following.   discussed with hospitalist
s/w Dr Pires: recs:  rose to BSD, chk labs now and in am, NPO after midnight, gentle IVF while NPO, continue AB, hold ASA/SQ heparin, possible IR percutaneous drainage of right perinephric fluid collection 5/20 - will speak to IR MD

## 2019-05-23 NOTE — PROGRESS NOTE ADULT - SUBJECTIVE AND OBJECTIVE BOX
[facility]  INFECTIOUS DISEASES AND INTERNAL MEDICINE at [place]  =======================================================  [facility]   Sanjay Rendon MD  Diplomates American Board of Internal Medicine and Infectious Diseases  Telephone [phone]  Fax            [phone]  =======================================================    [id number]  BETY TRINIDAD   follow up for:  fevers, right lower abd pain  patient seen and examined.     s/p CT scan abd with oral contrast 5/23    ===================================================  REVIEW OF SYSTEMS:  as above  all other ROS negative    =======================================================  Allergies  No Known Allergies    Antibiotics:  ertapenem  IVPB      ertapenem  IVPB 500 milliGRAM(s) IV Intermittent every 24 hours    ======================================================  Physical Exam:  ============  T(F): 97.2 (23 May 2019 08:09), Max: 97.7 (22 May 2019 23:20)  HR: 91 (23 May 2019 08:09)  BP: 118/62 (23 May 2019 08:09)  RR: 18 (23 May 2019 08:09)  SpO2: 95% (23 May 2019 08:09) (95% - 95%)  temp max in last 48H T(F): , Max: 98.7 (05-21-19 @ 19:19)    General:  No acute distress. FRAIL    Eye: Pupils are equal, round and reactive to light, Extraocular movements are intact, Normal conjunctiva.  HENT: Normocephalic, Oral mucosa is dry  Neck: Supple, No lymphadenopathy.  Respiratory: Lungs are clear to auscultation, Respirations are non-labored.  Cardiovascular: Normal rate, Regular rhythm,   Gastrointestinal: Soft,  + bowel sounds, + right sided drain SEAN in place with copious green fluid  Genitourinary: No costovertebral angle tenderness.  Lymphatics: No lymphadenopathy neck,   Musculoskeletal: Normal range of motion, Normal strength.  Integumentary: No rash.  Neurologic: Alert, Oriented, No focal deficits, Cranial Nerves II-XII are grossly intact.  Psychiatric: Appropriate mood & affect.      =======================================================     EXAM:  CT ABDOMEN AND PELVIS OC                          PROCEDURE DATE:  05/22/2019          INTERPRETATION:  CT ABDOMEN AND PELVIS WITH ORAL CONTRAST    CLINICAL INFORMATION: Status post drainage of right perinephric   collection with bilious aspirates, rule out duodenal perforation      COMPARISON: CT abdomen pelvis 5/18/2019    PROCEDURE:   CT of the Abdomen and Pelvis was performed without intravenous contrast.   Oral contrast: positive contrast was administered.  Sagittal and coronal reformats were performed.    FINDINGS:    LOWER CHEST: Small bilateral pleural effusions and bibasilar atelectasis.    LIVER: Normal.  BILE DUCTS: Multiple distal CBD calculi again noted. Mild biliary   dilatation.  GALLBLADDER: Gallstones.  SPLEEN: Normal.  PANCREAS: Diffuse atrophy.  ADRENALS: Normal.  KIDNEYS/URETERS: No hydronephrosis or urinary tract calculi. No significant change in large right perinephric collection with pigtail drainage catheter in place. No extraluminal oral contrast.    BLADDER: Underdistended limiting evaluation. Small intraluminal gas,   correlate for recent catheterization.  REPRODUCTIVE ORGANS: Enlarged uterus. No adnexal mass.    BOWEL: No bowel-related abnormality. Specifically, no bowel obstruction.   Large hiatal hernia containing a completely intrathoracic stomach and a portion of the transverse colon.  PERITONEUM: No free air or ascites.  VESSELS:  Normal caliber aorta.  RETROPERITONEUM: No adenopathy or hematoma.    ABDOMINAL WALL: Normal.  BONES: No aggressive lesion.    IMPRESSION:     No significant change in large right perinephric collection with pigtail drainage catheter in place. No extraluminal oral contrast.      LAURA MOSQUEDA M.D., ATTENDING RADIOLOGIST  This document has been electronically signed. May 22 2019  6:06PM   =======================================================    Labs:                        11.6   10.6  )-----------( 231      ( 22 May 2019 11:26 )             34.8       WBC Count: 10.6 K/uL (05-22-19 @ 11:26)  WBC Count: 11.2 K/uL (05-21-19 @ 08:02)  WBC Count: 14.6 K/uL (05-20-19 @ 10:37)  WBC Count: 16.5 K/uL (05-18-19 @ 18:15)      05-23    143  |  107  |  53.0<H>  ----------------------------<  99  3.8   |  25.0  |  1.28    Ca    10.1      23 May 2019 08:23  Phos  3.0     05-23  Mg     1.9     05-23    TPro  5.2<L>  /  Alb  2.1<L>  /  TBili  0.8  /  DBili  x   /  AST  13  /  ALT  10  /  AlkPhos  87  05-23      Culture - Acid Fast - Body Fluid w/Smear (collected 05-20-19 @ 18:27)  Source: .Body Fluid    Culture - Abscess with Gram Stain (collected 05-20-19 @ 12:16)  Source: .Abscess  Gram Stain (05-20-19 @ 15:09):    Few WBC's    No organisms seen  Organism: Klebsiella pneumoniae (05-22-19 @ 11:42)  Organism: Klebsiella pneumoniae (05-22-19 @ 11:42)    Sensitivities:      -  Amikacin: S <=16      -  Ampicillin: R >16 These ampicillin results predict results for amoxicillin      -  Ampicillin/Sulbactam: S <=8/4      -  Aztreonam: S <=4      -  Cefazolin: S <=8      -  Cefepime: S <=4      -  Cefoxitin: S <=8      -  Ceftriaxone: S <=1 Enterobacter, Citrobacter, and Serratia may develop resistance during prolonged therapy      -  Ciprofloxacin: S <=1      -  Ertapenem: S <=1      -  Gentamicin: S <=4      -  Imipenem: S <=1      -  Levofloxacin: S <=2      -  Meropenem: S <=1      -  Piperacillin/Tazobactam: S <=16      -  Tobramycin: S <=4      -  Trimethoprim/Sulfamethoxazole: S <=2/38      Method Type: PEPE    Culture - Blood (collected 05-16-19 @ 20:37)  Source: .Blood  Final Report (05-21-19 @ 21:00):    No growth at 5 days.    Culture - Blood (collected 05-16-19 @ 20:37)  Source: .Blood  Final Report (05-21-19 @ 21:00):    No growth at 5 days.    Culture - Urine (collected 05-16-19 @ 17:53)  Source: .Urine  Final Report (05-17-19 @ 17:09):    <10,000 CFU/ml Corynebacterium species

## 2019-05-23 NOTE — PROGRESS NOTE ADULT - ASSESSMENT
This 89 years old male with PMH of Colon Cancer s/p Resection, Hiatal Hernia, HTN and HLD brought by family with right upper quadrant and right shoulder pain    had sonographic moy sign, but negative HIDA  right sided flank pain  CT scan with large collection,   s/p placement of IR drain    Cultures sent  GNR --> klebsiella, also enterococcus species  CT scan without extraluminal contrast    - continue  Ertapenem    - follow up all outstanding cultures

## 2019-05-24 NOTE — PROGRESS NOTE ADULT - GASTROINTESTINAL DETAILS
soft/no distention/no rebound tenderness/nontender/no guarding
soft/nontender/no distention
soft/nontender/no distention
no rebound tenderness/no guarding/nontender/no rigidity/no distention/soft/bowel sounds normal

## 2019-05-24 NOTE — PROGRESS NOTE ADULT - SUBJECTIVE AND OBJECTIVE BOX
INTERVAL HPI/OVERNIGHT EVENTS:    SUBJECTIVE:  ROBERTO.  No abdominal pain, no f/ch/sob/cp/n/v.  Output continues to be thick greenish.      MEDICATIONS  (STANDING):  atorvastatin 10 milliGRAM(s) Oral at bedtime  dextrose 5% + sodium chloride 0.45% 1000 milliLiter(s) (100 mL/Hr) IV Continuous <Continuous>  ertapenem  IVPB      ertapenem  IVPB 500 milliGRAM(s) IV Intermittent every 24 hours  fentaNYL   Patch  12 MICROgram(s)/Hr 1 Patch Transdermal every 72 hours  heparin  Injectable 5000 Unit(s) SubCutaneous every 12 hours  lidocaine   Patch 1 Patch Transdermal daily  montelukast 10 milliGRAM(s) Oral daily  nystatin Powder 1 Application(s) Topical two times a day  pantoprazole  Injectable 40 milliGRAM(s) IV Push daily  saccharomyces boulardii 250 milliGRAM(s) Oral two times a day  senna 2 Tablet(s) Oral at bedtime  tamsulosin 0.4 milliGRAM(s) Oral at bedtime    MEDICATIONS  (PRN):  acetaminophen   Tablet .. 650 milliGRAM(s) Oral every 6 hours PRN Temp greater or equal to 38C (100.4F), Mild Pain (1 - 3)  HYDROmorphone  Injectable 0.5 milliGRAM(s) IV Push every 4 hours PRN Severe Pain (7 - 10)  ondansetron Injectable 4 milliGRAM(s) IV Push every 6 hours PRN Nausea and/or Vomiting  polyethylene glycol 3350 17 Gram(s) Oral daily PRN Constipation  traMADol 25 milliGRAM(s) Oral every 4 hours PRN Moderate Pain (4 - 6)      Vital Signs Last 24 Hrs  T(C): 36.9 (24 May 2019 15:20), Max: 37 (23 May 2019 23:14)  T(F): 98.4 (24 May 2019 15:20), Max: 98.6 (23 May 2019 23:14)  HR: 82 (24 May 2019 15:20) (82 - 94)  BP: 132/73 (24 May 2019 15:20) (112/65 - 132/73)  BP(mean): --  RR: 18 (24 May 2019 15:20) (18 - 18)  SpO2: 95% (24 May 2019 15:20) (94% - 95%)    PE  Gen: NAD  Pulm: nonlabored  CV: RRR  Abd: soft, nt, nd, IR drain present with viscous green output  Ext: no cyanosis, clubbing, or edema  Neuro: AAOx3, no sensory or motor deficits      I&O's Detail    23 May 2019 07:01  -  24 May 2019 07:00  --------------------------------------------------------  IN:  Total IN: 0 mL    OUT:    Bulb: 240 mL  Total OUT: 240 mL    Total NET: -240 mL      24 May 2019 07:01  -  24 May 2019 19:56  --------------------------------------------------------  IN:  Total IN: 0 mL    OUT:    Bulb: 50 mL    Stool: 1 mL    Voided: 1 mL  Total OUT: 52 mL    Total NET: -52 mL          LABS:                        10.8   11.7  )-----------( 243      ( 24 May 2019 11:06 )             33.2     05-24    140  |  106  |  40.0<H>  ----------------------------<  143<H>  3.9   |  25.0  |  1.09    Ca    10.2      24 May 2019 11:06  Phos  2.6     05-24  Mg     1.6     05-24    TPro  5.4<L>  /  Alb  2.1<L>  /  TBili  0.8  /  DBili  x   /  AST  14  /  ALT  10  /  AlkPhos  88  05-24          RADIOLOGY & ADDITIONAL STUDIES:

## 2019-05-24 NOTE — CHART NOTE - NSCHARTNOTEFT_GEN_A_CORE
Upon Nutritional Assessment by the Registered Dietitian your patient was determined to meet criteria / has evidence of the following diagnosis/diagnoses:          [ ]  Mild Protein Calorie Malnutrition        [ ]  Moderate Protein Calorie Malnutrition        [ X] Severe Protein Calorie Malnutrition        [ ] Unspecified Protein Calorie Malnutrition        [ ] Underweight / BMI <19        [ ] Morbid Obesity / BMI > 40      Findings as based on:  •  Comprehensive nutrition assessment and consultation  •  Calorie counts (nutrient intake analysis)  •  Food acceptance and intake status from observations by staff  •  Follow up  •  Patient education  •  Intervention secondary to interdisciplinary rounds  •   concerns      Treatment:    The following diet has been recommended:  Continue regular diet with 8oz Ensure Enlive TID po.  Rx: MVI daily po.      PROVIDER Section:     By signing this assessment you are acknowledging and agree with the diagnosis/diagnoses assigned by the Registered Dietitian    Comments:

## 2019-05-24 NOTE — PROGRESS NOTE ADULT - ASSESSMENT
89 years old male with PMH of Colon Cancer s/p Resection, Hiatal Hernia, HTN and HLD brought by family with right upper quadrant and right shoulder pain. As per patient, pain started 5 days prior on back side of right chest and then radiated to right shoulder and right upper quadrant. She pulled her right shoulder while getting into her son's truck 5 days ago and she was attributing the pain to it.  It is associated with nausea and decrease appetite. For 3 days, pain was mostly in right upper quadrant and flank area and it is getting worse so she was brought to ER.  Pt admitted. HIDA scan negative for cholecystitis, US of renal with abnormalities on the right urethral kidney area , CT with possible perinephric collection/urinoma.  Urology consulted and recommended IR drainage of fluid collection.  Patient s/p IR drainage 5/20/19 with thick green drainage.     pt work up has been inconclusive as to where is from. CT with Po contrast does not indicate intestinal leak. Per surgery continue to monitor/abs and drain. and urology doubts its a urinoma.    1- Flank pain/RUQ pain improving   CT scan with possible perinephric collection/ urinoma  s/p IR drainage and fluid cx with Klebsiella pneumoniae  Urology following.   Continue Invanz and ID following  on probiotics while on abx  leukocytosis; resolved  CT scan with po contrast negative for GI leak  Creatinine level of drain is pending, discussed with surgery and no intervention at this time, continue to monitor    2- ARF on CRF unknown baseline   s/p iv hydration  Monitor BMP: improved    3- Severe Protein Calorie Malnutrition  cont ensure with meals and MVI  nutritional recs appreciated     4- Cholelithiasis   HIDA scan 5/17/19, no cholecystitis   asymptomatic  GI recs appreciated    5- Prophylactic measure  DVT ppx: Heparin SW    DNR/DNI   PT consulted and recommended Home PT.  Disposition: Unclear date at this time. 89 years old male with PMH of Colon Cancer s/p Resection, Hiatal Hernia, HTN and HLD brought by family with right upper quadrant and right shoulder pain. As per patient, pain started 5 days prior on back side of right chest and then radiated to right shoulder and right upper quadrant. She pulled her right shoulder while getting into her son's truck 5 days ago and she was attributing the pain to it.  It is associated with nausea and decrease appetite. For 3 days, pain was mostly in right upper quadrant and flank area and it is getting worse so she was brought to ER.  Pt admitted. HIDA scan negative for cholecystitis, US of renal with abnormalities on the right urethral kidney area , CT with possible perinephric collection/urinoma.  Urology consulted and recommended IR drainage of fluid collection.  Patient s/p IR drainage 5/20/19 with thick green drainage.     pt work up has been inconclusive as to where is from. CT with Po contrast does not indicate intestinal leak. Per surgery continue to monitor/abs and drain. and urology doubts its a urinoma.    1- Flank pain/RUQ pain improving   CT scan with possible perinephric collection/ urinoma  s/p IR drainage and fluid cx with Klebsiella pneumoniae  Urology following.   Continue Invanz and ID following  on probiotics while on abx  leukocytosis; resolved  CT scan with po contrast negative for GI leak  Creatinine level of drain is pending, discussed with surgery and no intervention at this time, continue to monitor  Upper GI series ordered per surgery recs    2- ARF on CRF unknown baseline   s/p iv hydration  Monitor BMP: improved    3- Severe Protein Calorie Malnutrition  cont ensure with meals and MVI  nutritional recs appreciated     4- Cholelithiasis   HIDA scan 5/17/19, no cholecystitis   asymptomatic  GI recs appreciated    5- Prophylactic measure  DVT ppx: Heparin SW    DNR/DNI   PT consulted and recommended Home PT.  Disposition: Unclear date at this time.

## 2019-05-24 NOTE — PROGRESS NOTE ADULT - SUBJECTIVE AND OBJECTIVE BOX
Subjective:89yFemale  with right retroperitoneal collection, s/p IR drainage 5/20. Pt resting comfortably, rose cath removed, pt has been voiding.  Tolerating diet. No c/o pain, n/v.      Vital Signs Last 24 Hrs  T(C): 36.7 (24 May 2019 08:36), Max: 37 (23 May 2019 23:14)  T(F): 98.1 (24 May 2019 08:36), Max: 98.6 (23 May 2019 23:14)  HR: 94 (24 May 2019 08:36) (83 - 95)  BP: 112/65 (24 May 2019 08:36) (108/67 - 117/71)  BP(mean): --  RR: 18 (24 May 2019 08:36) (18 - 18)  SpO2: 94% (24 May 2019 08:36) (94% - 95%)  I&O's Detail    23 May 2019 07:01  -  24 May 2019 07:00  --------------------------------------------------------  IN:  Total IN: 0 mL    OUT:    Bulb: 240 mL  Total OUT: 240 mL    Total NET: -240 mL      24 May 2019 07:01  -  24 May 2019 11:52  --------------------------------------------------------  IN:  Total IN: 0 mL    OUT:    Bulb: 50 mL  Total OUT: 50 mL    Total NET: -50 mL          Labs:                        10.8   11.7  )-----------( 243      ( 24 May 2019 11:06 )             33.2     05-24    140  |  106  |  40.0<H>  ----------------------------<  143<H>  3.9   |  25.0  |  1.09    Ca    10.2      24 May 2019 11:06  Phos  2.6     05-24  Mg     1.6     05-24    TPro  5.4<L>  /  Alb  2.1<L>  /  TBili  0.8  /  DBili  x   /  AST  14  /  ALT  10  /  AlkPhos  88  05-24

## 2019-05-24 NOTE — PROGRESS NOTE ADULT - ASSESSMENT
This 89 years old male with PMH of Colon Cancer s/p Resection, Hiatal Hernia, HTN and HLD brought by family with right upper quadrant and right shoulder pain    had sonographic moy sign, but negative HIDA  right sided flank pain  CT scan with large collection,   s/p placement of IR drain    Cultures sent  GNR --> klebsiella, also enterococcus faecalis SS to ampicillin  CT scan without extraluminal contrast    - continue  Ertapenem to cover organisms above      - will add Nystatin powder for possible candidiasis of skin    - follow up all outstanding cultures

## 2019-05-24 NOTE — PROGRESS NOTE ADULT - ASSESSMENT
89 yo female with retroperitoneal collection, s/p IR drainage, poss walled off bowel perforation, does not look like urinoma  - cont abx for klebsiella  - f/u fluid creatinine

## 2019-05-24 NOTE — PROGRESS NOTE ADULT - ASSESSMENT
Improved MARY due to renal hypoperfusion==> continues IVF  - avoid potential nephrotoxins  - monitor labs    R perinephric collection: s/p drainage 5/20/19  (?) GI microperforation and abscess  - continue IV antibiotics as per ID  - conservative care for now    Hypercalcemia: etiology unclear, likely immobilization   Corrected serum Ca+ (for alb)= 11.6md/dL  - PTH 30   - avoid Ca+ supplements and Vit d  - follow labs; may need to consider treating if upward trend continues

## 2019-05-24 NOTE — DIETITIAN INITIAL EVALUATION ADULT. - OTHER INFO
Pt admitted with cholecyctitis s/p IR drain placement. Pt reports fair appetite, completing about 50% of meals and is sipping on Ensure shakes. Pt reports probably few lb weight loss over the past 2 weeks but can not quantify.

## 2019-05-24 NOTE — DIETITIAN INITIAL EVALUATION ADULT. - PHYSICAL APPEARANCE
physical signs of malnutrition [ ]absent [X]present. [ ]Mild [ ]Moderate [X]Severe Muscle wasting present at [X] temporal [X]clavicle [ ]interosseos [ ]calf. [ ]Mild [X]Moderate [ ]Severe subcutaneous fat loss presents at [ ]ribs []orbital region [ ]triceps [X]buccal area.

## 2019-05-24 NOTE — PROGRESS NOTE ADULT - SUBJECTIVE AND OBJECTIVE BOX
Patient seen and examined  Left flank drain bilious.  Images reviewed  creatinine pending form fluid.  Fluid bilirubin same as serous   NO surgical issues at current time  Suggest UGI for fully rule out localized proximal leak

## 2019-05-24 NOTE — PROGRESS NOTE ADULT - ASSESSMENT
89y Female presents with right retroperitonal fluid collection      PLAN:  - No acute surgical intervention; family also does not desire surgical intervention  - Continue IR drain  - Continue IV antibiotics  - Recommend upper GI series which we ordered  - Recommend protonix drip  - We will continue to follow  - rest of care and conservative medical management per primary team 89y Female presents with right retroperitonal fluid collection      PLAN:  - No acute surgical intervention; family also does not desire surgical intervention  - Continue IR drain  - Continue IV antibiotics  - Recommend upper GI series which were ordered  - Recommend protonix drip  - We will continue to follow  - rest of care and conservative medical management per primary team

## 2019-05-24 NOTE — PROGRESS NOTE ADULT - SUBJECTIVE AND OBJECTIVE BOX
[facility]  INFECTIOUS DISEASES AND INTERNAL MEDICINE at [place]  =======================================================  Elio James MD [facility]endon MD  Diplomates American Board of Internal Medicine and Infectious Diseases  Telephone [phone]  Fax            [phone]  =======================================================    [id number]  BETY TRINIDAD   follow up for:  fevers, right lower abd pain  patient seen and examined.     no new issues.   still with drainage from the right sided drain    ===================================================  REVIEW OF SYSTEMS:  as above  all other ROS negative    =======================================================  Allergies  No Known Allergies    Antibiotics:  ertapenem  IVPB      ertapenem  IVPB 500 milliGRAM(s) IV Intermittent every 24 hours    ======================================================  Physical Exam:  ============  T(F): 98.1 (24 May 2019 08:36), Max: 98.6 (23 May 2019 23:14)  HR: 94 (24 May 2019 08:36)  BP: 112/65 (24 May 2019 08:36)  RR: 18 (24 May 2019 08:36)  SpO2: 94% (24 May 2019 08:36) (94% - 95%)  temp max in last 48H T(F): , Max: 98.6 (05-23-19 @ 23:14)    General:  No acute distress. FRAIL    Eye: Pupils are equal, round and reactive to light, Extraocular movements are intact, Normal conjunctiva.  HENT: Normocephalic, Oral mucosa is dry  Neck: Supple, No lymphadenopathy.  Respiratory: Lungs are clear to auscultation, Respirations are non-labored.  Cardiovascular: Normal rate, Regular rhythm,   Gastrointestinal: Soft,  + bowel sounds, + right sided drain SEAN in place with copious green fluid  Genitourinary: No costovertebral angle tenderness.  Lymphatics: No lymphadenopathy neck,   Musculoskeletal: Normal range of motion, Normal strength.  Integumentary: No rash.  Neurologic: Alert, Oriented, No focal deficits, Cranial Nerves II-XII are grossly intact.  Psychiatric: Appropriate mood & affect.      =======================================================  Labs:                        10.8   11.7  )-----------( 243      ( 24 May 2019 11:06 )             33.2       WBC Count: 11.7 K/uL (05-24-19 @ 11:06)  WBC Count: 10.6 K/uL (05-22-19 @ 11:26)  WBC Count: 11.2 K/uL (05-21-19 @ 08:02)  WBC Count: 14.6 K/uL (05-20-19 @ 10:37)      05-24    140  |  106  |  40.0<H>  ----------------------------<  143<H>  3.9   |  25.0  |  1.09    Ca    10.2      24 May 2019 11:06  Phos  2.6     05-24  Mg     1.6     05-24    TPro  5.4<L>  /  Alb  2.1<L>  /  TBili  0.8  /  DBili  x   /  AST  14  /  ALT  10  /  AlkPhos  88  05-24      Culture - Acid Fast - Body Fluid w/Smear (collected 05-20-19 @ 18:27)  Source: .Body Fluid    Culture - Abscess with Gram Stain (collected 05-20-19 @ 12:16)  Source: .Abscess  Gram Stain (05-20-19 @ 15:09):    Few WBC's    No organisms seen  Organism: Klebsiella pneumoniae  Enterococcus faecalis (05-23-19 @ 18:24)  Organism: Enterococcus faecalis (05-23-19 @ 18:24)    Sensitivities:      -  Ampicillin: S <=2 Predicts results to ampicillin/sulbactam, amoxacillin-clavulanate and  piperacillin-tazobactam.      -  Tetra/Doxy: S <=4      -  Vancomycin: S 2      Method Type: PEPE  Organism: Klebsiella pneumoniae (05-22-19 @ 11:42)    Sensitivities:      -  Amikacin: S <=16      -  Ampicillin: R >16 These ampicillin results predict results for amoxicillin      -  Ampicillin/Sulbactam: S <=8/4      -  Aztreonam: S <=4      -  Cefazolin: S <=8      -  Cefepime: S <=4      -  Cefoxitin: S <=8      -  Ceftriaxone: S <=1 Enterobacter, Citrobacter, and Serratia may develop resistance during prolonged therapy      -  Ciprofloxacin: S <=1      -  Ertapenem: S <=1      -  Gentamicin: S <=4      -  Imipenem: S <=1      -  Levofloxacin: S <=2      -  Meropenem: S <=1      -  Piperacillin/Tazobactam: S <=16      -  Tobramycin: S <=4      -  Trimethoprim/Sulfamethoxazole: S <=2/38      Method Type: PEPE    Culture - Blood (collected 05-16-19 @ 20:37)  Source: .Blood  Final Report (05-21-19 @ 21:00):    No growth at 5 days.    Culture - Blood (collected 05-16-19 @ 20:37)  Source: .Blood  Final Report (05-21-19 @ 21:00):    No growth at 5 days.    Culture - Urine (collected 05-16-19 @ 17:53)  Source: .Urine  Final Report (05-17-19 @ 17:09):    <10,000 CFU/ml Corynebacterium species

## 2019-05-24 NOTE — PROGRESS NOTE ADULT - SUBJECTIVE AND OBJECTIVE BOX
BETY TRINIDAD    [id number]    89y      Female    cc: fluid in abdomien    89 years old male with PMH of Colon Cancer s/p Resection, Hiatal Hernia, HTN and HLD brought by family with right upper quadrant and right shoulder pain. As per patient, pain started 5 days prior on back side of right chest and then radiated to right shoulder and right upper quadrant. She pulled her right shoulder while getting into her son's truck 5 days ago and she was attributing the pain to it.  It is associated with nausea and decrease appetite. For 3 days, pain was mostly in right upper quadrant and flank area and it is getting worse so she was brought to ER.  Pt admitted. HIDA scan negative for cholecystitis, US of renal with abnormalities on the right urethral kidney area , CT with possible perinephric collection/urinoma.  Urology consulted and recommended IR drainage of fluid collection.  Patient s/p IR drainage 5/20/19 with thick green drainage.     pt work up has been inconclusive as to where is from. CT with Po contrast does not indicate intestinal leak. Per surgery continue to monitor/abs and drain  and urology doubts its a urinoma.     today pt denies any pain, no n/v. tolerating po intake      REVIEW OF SYSTEMS:    CONSTITUTIONAL: No fever, weight loss, or fatigue  RESPIRATORY: No cough, wheezing, hemoptysis; No shortness of breath  CARDIOVASCULAR: No chest pain, palpitations  GASTROINTESTINAL: No abdominal or epigastric pain. No nausea, vomiting  NEUROLOGICAL: No headaches, memory loss, loss of strength.  MISCELLANEOUS:        Vital Signs Last 24 Hrs  T(C): 36.7 (24 May 2019 08:36), Max: 37 (23 May 2019 23:14)  T(F): 98.1 (24 May 2019 08:36), Max: 98.6 (23 May 2019 23:14)  HR: 94 (24 May 2019 08:36) (83 - 95)  BP: 112/65 (24 May 2019 08:36) (108/67 - 117/71)  BP(mean): --  RR: 18 (24 May 2019 08:36) (18 - 18)  SpO2: 94% (24 May 2019 08:36) (94% - 95%)    PHYSICAL EXAM:    GENERAL: NAD  HEAD:  Atraumatic, Normocephalic  NECK: Supple, No JVD, Normal thyroid  NERVOUS SYSTEM:  Alert & Oriented X3, Good concentration; Motor Strength 5/5 B/L upper and lower extremities  CHEST/LUNG: Clear to auscultation bilaterally; No rales, rhonchi, wheezing, or rubs  HEART: Regular rate and rhythm; No murmurs, rubs, or gallops  ABDOMEN: Soft, Nontender, Nondistended; Bowel sounds present; SEAN with green drainage  EXTREMITIES:  2+ Peripheral Pulses, No clubbing, cyanosis, or edema      LABS:                        10.8   11.7  )-----------( 243      ( 24 May 2019 11:06 )             33.2     05-24    140  |  106  |  40.0<H>  ----------------------------<  143<H>  3.9   |  25.0  |  1.09    Ca    10.2      24 May 2019 11:06  Phos  2.6     05-24  Mg     1.6     05-24    TPro  5.4<L>  /  Alb  2.1<L>  /  TBili  0.8  /  DBili  x   /  AST  14  /  ALT  10  /  AlkPhos  88  05-24            MEDICATIONS  (STANDING):  atorvastatin 10 milliGRAM(s) Oral at bedtime  dextrose 5% + sodium chloride 0.45% 1000 milliLiter(s) (100 mL/Hr) IV Continuous <Continuous>  ertapenem  IVPB      ertapenem  IVPB 500 milliGRAM(s) IV Intermittent every 24 hours  fentaNYL   Patch  12 MICROgram(s)/Hr 1 Patch Transdermal every 72 hours  heparin  Injectable 5000 Unit(s) SubCutaneous every 12 hours  lidocaine   Patch 1 Patch Transdermal daily  montelukast 10 milliGRAM(s) Oral daily  nystatin Powder 1 Application(s) Topical two times a day  pantoprazole  Injectable 40 milliGRAM(s) IV Push daily  saccharomyces boulardii 250 milliGRAM(s) Oral two times a day  senna 2 Tablet(s) Oral at bedtime  tamsulosin 0.4 milliGRAM(s) Oral at bedtime    MEDICATIONS  (PRN):  acetaminophen   Tablet .. 650 milliGRAM(s) Oral every 6 hours PRN Temp greater or equal to 38C (100.4F), Mild Pain (1 - 3)  HYDROmorphone  Injectable 0.5 milliGRAM(s) IV Push every 4 hours PRN Severe Pain (7 - 10)  ondansetron Injectable 4 milliGRAM(s) IV Push every 6 hours PRN Nausea and/or Vomiting  polyethylene glycol 3350 17 Gram(s) Oral daily PRN Constipation  traMADol 25 milliGRAM(s) Oral every 4 hours PRN Moderate Pain (4 - 6)      RADIOLOGY & ADDITIONAL TESTS:

## 2019-05-25 NOTE — PROGRESS NOTE ADULT - ASSESSMENT
89 years old male with PMH of Colon Cancer s/p Resection, Hiatal Hernia, HTN and HLD brought by family with right upper quadrant and right shoulder pain. As per patient, pain started 5 days prior on back side of right chest and then radiated to right shoulder and right upper quadrant. She pulled her right shoulder while getting into her son's truck 5 days ago and she was attributing the pain to it.  It is associated with nausea and decrease appetite. For 3 days, pain was mostly in right upper quadrant and flank area and it is getting worse so she was brought to ER.  Pt admitted. HIDA scan negative for cholecystitis, US of renal with abnormalities on the right urethral kidney area , CT with possible perinephric collection/urinoma.  Urology consulted and recommended IR drainage of fluid collection.  Patient s/p IR drainage 5/20/19 with thick green drainage. Patient work up has been inconclusive as to where is from. CT with Po contrast does not indicate intestinal leak. Per surgery continue to monitor/abs and drain. Urology doubts its a urinoma.    1) Right Flank / RUQ pain  improving   CT scan with possible perinephric collection/ urinoma  s/p IR drainage and fluid cx with Klebsiella pneumoniae  Urology following.   Continue Invanz and ID following  on probiotics while on abx  leukocytosis; resolving  CT scan with po contrast negative for GI leak  Creatinine level of drain is pending, discussed with surgery and no intervention at this time, continue to monitor  Upper GI series ordered per surgery recs    2) ARF on CRF unknown baseline   s/p iv hydration  improved  avoid nephrotoxic medications  Renal input appreciated    3) Hypercalcemia  etiology unclear, likely immobilization   avoid Ca+ supplements and Vit D  may need to consider treating if upward trend continues as per Renal    4) Severe Protein Calorie Malnutrition  cont ensure with meals and MVI  nutritional recs appreciated     5) Cholelithiasis   HIDA scan 5/17/19, no cholecystitis   asymptomatic  GI input appreciated    6) Rotator Cuff Tear  Pain control  Right arm sling  Ortho Consult appreciated    7) HTN  Hold Enalapril and HCTZ for now    8) HLD  Continue Statins    9) GERD  Protonix 40 mg    DVT Prophylaxis -- Heparin 5000 Units    DNR/DNI   PT consulted and recommended Home PT.  Disposition: Unclear date at this time.

## 2019-05-25 NOTE — PROGRESS NOTE ADULT - ASSESSMENT
ASSESSMENT:  89y Female presents with right retroperitonal fluid collection      PLAN:  - No acute surgical intervention  - Continue IR drain  - Continue IV antibiotics  - Upper GI series   - We will continue to follow  - rest of care per primary team

## 2019-05-25 NOTE — PROGRESS NOTE ADULT - SUBJECTIVE AND OBJECTIVE BOX
NEPHROLOGY INTERVAL HPI/OVERNIGHT EVENTS:      Feels better   No new events   Surgery follow up noted         MEDICATIONS  (STANDING):  atorvastatin 10 milliGRAM(s) Oral at bedtime  dextrose 5% + sodium chloride 0.45% 1000 milliLiter(s) (100 mL/Hr) IV Continuous <Continuous>  ertapenem  IVPB      ertapenem  IVPB 500 milliGRAM(s) IV Intermittent every 24 hours  fentaNYL   Patch  12 MICROgram(s)/Hr 1 Patch Transdermal every 72 hours  heparin  Injectable 5000 Unit(s) SubCutaneous every 12 hours  lidocaine   Patch 1 Patch Transdermal daily  montelukast 10 milliGRAM(s) Oral daily  nystatin Powder 1 Application(s) Topical two times a day  pantoprazole  Injectable 40 milliGRAM(s) IV Push daily  saccharomyces boulardii 250 milliGRAM(s) Oral two times a day  senna 2 Tablet(s) Oral at bedtime  tamsulosin 0.4 milliGRAM(s) Oral at bedtime    MEDICATIONS  (PRN):  acetaminophen   Tablet .. 650 milliGRAM(s) Oral every 6 hours PRN Temp greater or equal to 38C (100.4F), Mild Pain (1 - 3)  HYDROmorphone  Injectable 0.5 milliGRAM(s) IV Push every 4 hours PRN Severe Pain (7 - 10)  ondansetron Injectable 4 milliGRAM(s) IV Push every 6 hours PRN Nausea and/or Vomiting  polyethylene glycol 3350 17 Gram(s) Oral daily PRN Constipation  traMADol 25 milliGRAM(s) Oral every 4 hours PRN Moderate Pain (4 - 6)      Allergies    No Known Allergies    Intolerances          Vital Signs Last 24 Hrs  T(C): 36.9 (25 May 2019 07:57), Max: 36.9 (24 May 2019 15:20)  T(F): 98.5 (25 May 2019 07:57), Max: 98.5 (25 May 2019 07:57)  HR: 90 (25 May 2019 07:57) (82 - 94)  BP: 115/73 (25 May 2019 07:57) (110/67 - 132/73)  BP(mean): --  RR: 18 (25 May 2019 07:57) (18 - 18)  SpO2: 94% (25 May 2019 07:57) (94% - 95%)  Daily     Daily Weight in k.2 (24 May 2019 11:01)  I&O's Detail    24 May 2019 07:  -  25 May 2019 07:00  --------------------------------------------------------  IN:  Total IN: 0 mL    OUT:    Bulb: 165 mL    Stool: 1 mL    Voided: 1 mL  Total OUT: 167 mL    Total NET: -167 mL        I&O's Summary    24 May 2019 07:  -  25 May 2019 07:00  --------------------------------------------------------  IN: 0 mL / OUT: 167 mL / NET: -167 mL        PHYSICAL EXAM:    PHYSICAL EXAM:  GENERAL: Frail, elderly  NECK: Supple, no jvd  NERVOUS SYSTEM:  Alert & Oriented X3; non focal  CHEST/LUNG: Clear bilaterally  HEART: Regular rate and rhythm; No rub  ABDOMEN: Soft, Nontender, Nondistended; +BS, + right sided drain noted   EXT - Min edema     LABS:                        10.8   11.7  )-----------( 243      ( 24 May 2019 11:06 )             33.2         140  |  106  |  40.0<H>  ----------------------------<  143<H>  3.9   |  25.0  |  1.09    Ca    10.2      24 May 2019 11:06  Phos  2.6       Mg     1.6         TPro  5.4<L>  /  Alb  2.1<L>  /  TBili  0.8  /  DBili  x   /  AST  14  /  ALT  10  /  AlkPhos  88          Phosphorus Level, Serum: 2.6 mg/dL ( @ 11:06)  Magnesium, Serum: 1.6 mg/dL ( @ 11:06)          RADIOLOGY & ADDITIONAL TESTS:

## 2019-05-25 NOTE — PROGRESS NOTE ADULT - ASSESSMENT
· Assessment		  Improved MARY due to renal hypoperfusion==> continues IVF  - avoid potential nephrotoxins  - monitor labs    R perinephric collection: s/p drainage 5/20/19  (?) GI microperforation and abscess  - continue IV antibiotics as per ID  - conservative care for now  Family does not want aggressive intervention   Surgery follow up noted     Hypercalcemia: etiology unclear, likely immobilization   Corrected serum Ca+ (for alb)= 11.6md/dL  - PTH 30   - avoid Ca+ supplements and Vit d  - follow labs; may need to consider treating if upward trend continues · Assessment		  Improved MARY due to renal hypoperfusion==> Off IVF   - avoid potential nephrotoxins  - monitor labs    R perinephric collection: s/p drainage 5/20/19  (?) GI microperforation and abscess  - continue IV antibiotics as per ID  - conservative care for now  Family does not want aggressive intervention   Surgery follow up noted     Hypercalcemia: etiology unclear, likely immobilization   Corrected serum Ca+ (for alb)= 11.6md/dL  - PTH 30   - avoid Ca+ supplements and Vit d  - follow labs; may need to consider treating if upward trend continues

## 2019-05-25 NOTE — PROGRESS NOTE ADULT - ASSESSMENT
This 89 years old male with PMH of Colon Cancer s/p Resection, Hiatal Hernia, HTN and HLD brought by family with right upper quadrant and right shoulder pain    had sonographic moy sign, but negative HIDA  right sided flank pain  CT scan with large collection,   s/p placement of IR drain    Cultures sent  GNR --> klebsiella, also enterococcus faecalis SS to ampicillin  CT scan without extraluminal contrast    -WILL CHANGE4 TO UNAYSN TO COVER BOTH  WITH FOLLOW UP   D/W FAMILY AT BEDSIDE

## 2019-05-25 NOTE — PROGRESS NOTE ADULT - SUBJECTIVE AND OBJECTIVE BOX
INTERVAL HPI/OVERNIGHT EVENTS:  No acute overnight events reported. Patient seen at bedside this morning with no major complaints. Left-sided IR drain continues to have green thick viscous fluid, nonpurulent. Patient afebrile, nontachy. Patient tolerating diet.        MEDICATIONS  (STANDING):  atorvastatin 10 milliGRAM(s) Oral at bedtime  dextrose 5% + sodium chloride 0.45% 1000 milliLiter(s) (100 mL/Hr) IV Continuous <Continuous>  ertapenem  IVPB      ertapenem  IVPB 500 milliGRAM(s) IV Intermittent every 24 hours  fentaNYL   Patch  12 MICROgram(s)/Hr 1 Patch Transdermal every 72 hours  heparin  Injectable 5000 Unit(s) SubCutaneous every 12 hours  lidocaine   Patch 1 Patch Transdermal daily  montelukast 10 milliGRAM(s) Oral daily  nystatin Powder 1 Application(s) Topical two times a day  pantoprazole  Injectable 40 milliGRAM(s) IV Push daily  saccharomyces boulardii 250 milliGRAM(s) Oral two times a day  senna 2 Tablet(s) Oral at bedtime  tamsulosin 0.4 milliGRAM(s) Oral at bedtime    MEDICATIONS  (PRN):  acetaminophen   Tablet .. 650 milliGRAM(s) Oral every 6 hours PRN Temp greater or equal to 38C (100.4F), Mild Pain (1 - 3)  HYDROmorphone  Injectable 0.5 milliGRAM(s) IV Push every 4 hours PRN Severe Pain (7 - 10)  ondansetron Injectable 4 milliGRAM(s) IV Push every 6 hours PRN Nausea and/or Vomiting  polyethylene glycol 3350 17 Gram(s) Oral daily PRN Constipation  traMADol 25 milliGRAM(s) Oral every 4 hours PRN Moderate Pain (4 - 6)      Vital Signs Last 24 Hrs  T(C): 36.9 (25 May 2019 07:57), Max: 36.9 (24 May 2019 15:20)  T(F): 98.5 (25 May 2019 07:57), Max: 98.5 (25 May 2019 07:57)  HR: 90 (25 May 2019 07:57) (82 - 90)  BP: 115/73 (25 May 2019 07:57) (110/67 - 132/73)  BP(mean): --  RR: 18 (25 May 2019 07:57) (18 - 18)  SpO2: 94% (25 May 2019 07:57) (94% - 95%)    PHYSICAL EXAM:  General: No acute distress  Respiratory: Nonlabored  Cardiovascular: S1/S2  Abdominal: Soft, nontender, No guarding or rebound. 10f drain in place to right upper quadrant, draining greenish fluid  : Smith in place  Extremities: Warm  Vascular: Radial pulse 2+, bilaterally      I&O's Detail    24 May 2019 07:01  -  25 May 2019 07:00  --------------------------------------------------------  IN:  Total IN: 0 mL    OUT:    Bulb: 165 mL    Stool: 1 mL    Voided: 1 mL  Total OUT: 167 mL    Total NET: -167 mL          LABS:                        10.8   11.7  )-----------( 243      ( 24 May 2019 11:06 )             33.2     05-24    140  |  106  |  40.0<H>  ----------------------------<  143<H>  3.9   |  25.0  |  1.09    Ca    10.2      24 May 2019 11:06  Phos  2.6     05-24  Mg     1.6     05-24    TPro  5.4<L>  /  Alb  2.1<L>  /  TBili  0.8  /  DBili  x   /  AST  14  /  ALT  10  /  AlkPhos  88  05-24          RADIOLOGY & ADDITIONAL STUDIES:

## 2019-05-25 NOTE — PROGRESS NOTE ADULT - SUBJECTIVE AND OBJECTIVE BOX
Cholecystitis    HPI:  89 years old male with PMH of Colon Cancer s/p Resection, Hiatal Hernia, HTN and HLD brought by family with right upper quadrant and right shoulder pain. As per patient, pain started 5 days ago on back side of right chest and then radiated to right shoulder and right upper quadrant. She pulled her right shoulder while getting into her son's truck 5 days ago and she was attributing the pain to it.   It is associated with nausea and decrease appetite. For the last 3 days pain is mostly in right upper quadrant and it is getting worse so she was brought to ER.   Denies fever, urinary symptoms, diarrhea or vomiting. Denies shoulder injury in past. (16 May 2019 17:30)    Interval History:  Patient was seen and examined at bedside around 12:15 pm. Feeling better. Pain (RUQ and Right Shoulder) improving. Tolerating PO.   Denies chest pain, palpitations, shortness of breath, headache, dizziness, visual symptoms, nausea or vomiting.  No overnight issues.    ROS:  As per interval history otherwise unremarkable.    PHYSICAL EXAM:  Vital Signs   T(C): 36.9 (25 May 2019 07:57), Max: 36.9 (24 May 2019 15:20)  T(F): 98.5 (25 May 2019 07:57), Max: 98.5 (25 May 2019 07:57)  HR: 90 (25 May 2019 07:57) (82 - 90)  BP: 115/73 (25 May 2019 07:57) (110/67 - 132/73)  RR: 18 (25 May 2019 07:57) (18 - 18)  SpO2: 94% (25 May 2019 07:57) (94% - 95%)  General: Elderly female sitting in bed comfortably. No acute distress  HEENT: PERRLA. EOMI. Clear conjunctivae. Moist mucus membrane. Hearing aids in place.   Neck: Supple.   Chest: CTA bilaterally - no wheezing, rales or rhonchi.   Heart: Normal S1 & S2. RRR.   Abdomen: Soft. Non-tender. Non-distended. + BS. SEAN drain on right side.   Ext: No pedal edema. No calf tenderness   Neuro: Active and alert. No focal deficit. No speech disorder  Skin: Warm and Dry  Psychiatry: Normal mood and affect    I&O's Summary    24 May 2019 07:01  -  25 May 2019 07:00  --------------------------------------------------------  IN: 0 mL / OUT: 167 mL / NET: -167 mL    MEDICATIONS  (STANDING):  atorvastatin 10 milliGRAM(s) Oral at bedtime  dextrose 5% + sodium chloride 0.45% 1000 milliLiter(s) (100 mL/Hr) IV Continuous <Continuous>  ertapenem  IVPB      ertapenem  IVPB 500 milliGRAM(s) IV Intermittent every 24 hours  fentaNYL   Patch  12 MICROgram(s)/Hr 1 Patch Transdermal every 72 hours  heparin  Injectable 5000 Unit(s) SubCutaneous every 12 hours  lidocaine   Patch 1 Patch Transdermal daily  montelukast 10 milliGRAM(s) Oral daily  nystatin Powder 1 Application(s) Topical two times a day  pantoprazole  Injectable 40 milliGRAM(s) IV Push daily  saccharomyces boulardii 250 milliGRAM(s) Oral two times a day  senna 2 Tablet(s) Oral at bedtime  tamsulosin 0.4 milliGRAM(s) Oral at bedtime    MEDICATIONS  (PRN):  acetaminophen   Tablet .. 650 milliGRAM(s) Oral every 6 hours PRN Temp greater or equal to 38C (100.4F), Mild Pain (1 - 3)  HYDROmorphone  Injectable 0.5 milliGRAM(s) IV Push every 4 hours PRN Severe Pain (7 - 10)  ondansetron Injectable 4 milliGRAM(s) IV Push every 6 hours PRN Nausea and/or Vomiting  polyethylene glycol 3350 17 Gram(s) Oral daily PRN Constipation  traMADol 25 milliGRAM(s) Oral every 4 hours PRN Moderate Pain (4 - 6)    LABS:                        10.8   11.4  )-----------( 312      ( 25 May 2019 13:28 )             33.9     05-24    140  |  106  |  40.0<H>  ----------------------------<  143<H>  3.9   |  25.0  |  1.09    Ca    10.2      24 May 2019 11:06  Phos  2.6     05-24  Mg     1.6     05-24    TPro  5.4<L>  /  Alb  2.1<L>  /  TBili  0.8  /  DBili  x   /  AST  14  /  ALT  10  /  AlkPhos  88  05-24    RADIOLOGY & ADDITIONAL STUDIES:  Reviewed

## 2019-05-25 NOTE — PROGRESS NOTE ADULT - SUBJECTIVE AND OBJECTIVE BOX
[facility]  INFECTIOUS DISEASES AND INTERNAL MEDICINE at [place]  =======================================================  Elio Rendon MD  Diplomates American Board of Internal Medicine and Infectious Diseases  Telephone [phone]  Fax            [phone]  =======================================================    [id number]  BETY TRINIDAD   follow up for:  fevers, right lower abd pain  patient  OOB TO CHAIR    no new issues.   still with drainage from the right sided drain    ===================================================  REVIEW OF SYSTEMS:  as above  all other ROS negative    =======================================================  Allergies  No Known Allergies    Antibiotics:  ertapenem  IVPB      ertapenem  IVPB 500 milliGRAM(s) IV Intermittent every 24 hours    ======================================================  Physical Exam:  ============   Vital Signs Last 24 Hrs  T(C): 36.9 (25 May 2019 07:57), Max: 36.9 (25 May 2019 00:40)  T(F): 98.5 (25 May 2019 07:57), Max: 98.5 (25 May 2019 07:57)  HR: 90 (25 May 2019 07:57) (84 - 90)  BP: 115/73 (25 May 2019 07:57) (110/67 - 115/73)  BP(mean): --  RR: 18 (25 May 2019 07:57) (18 - 18)  SpO2: 94% (25 May 2019 07:57) (94% - 94%)    General:  No acute distress. FRAIL    Eye: Pupils are equal, round and reactive to light, Extraocular movements are intact, Normal conjunctiva.  HENT: Normocephalic, Oral mucosa is dry  Neck: Supple, No lymphadenopathy.  Respiratory: Lungs are clear to auscultation, Respirations are non-labored.  Cardiovascular: Normal rate, Regular rhythm,   Gastrointestinal: Soft,  + bowel sounds, + right sided drain SEAN in place with copious green fluid  Genitourinary: No costovertebral angle tenderness.  Lymphatics: No lymphadenopathy neck,   Musculoskeletal: Normal range of motion, Normal strength.  Integumentary: No rash.  Neurologic: AWAKE       =======================================================  Labs:                                         10.8   11.4  )-----------( 312      ( 25 May 2019 13:28 )             33.9   05-25    140  |  104  |  37.0<H>  ----------------------------<  98  3.7   |  27.0  |  0.97    Ca    9.9      25 May 2019 13:28  Phos  2.8     05-25  Mg     1.7     05-25    TPro  5.4<L>  /  Alb  2.1<L>  /  TBili  0.8  /  DBili  x   /  AST  14  /  ALT  10  /  AlkPhos  88  05-24        Culture - Acid Fast - Body Fluid w/Smear (collected 05-20-19 @ 18:27)  Source: .Body Fluid    Culture - Abscess with Gram Stain (collected 05-20-19 @ 12:16)  Source: .Abscess  Gram Stain (05-20-19 @ 15:09):    Few WBC's    No organisms seen  Organism: Klebsiella pneumoniae  Enterococcus faecalis (05-23-19 @ 18:24)  Organism: Enterococcus faecalis (05-23-19 @ 18:24)    Sensitivities:      -  Ampicillin: S <=2 Predicts results to ampicillin/sulbactam, amoxacillin-clavulanate and  piperacillin-tazobactam.      -  Tetra/Doxy: S <=4      -  Vancomycin: S 2      Method Type: PEPE  Organism: Klebsiella pneumoniae (05-22-19 @ 11:42)    Sensitivities:      -  Amikacin: S <=16      -  Ampicillin: R >16 These ampicillin results predict results for amoxicillin      -  Ampicillin/Sulbactam: S <=8/4      -  Aztreonam: S <=4      -  Cefazolin: S <=8      -  Cefepime: S <=4      -  Cefoxitin: S <=8      -  Ceftriaxone: S <=1 Enterobacter, Citrobacter, and Serratia may develop resistance during prolonged therapy      -  Ciprofloxacin: S <=1      -  Ertapenem: S <=1      -  Gentamicin: S <=4      -  Imipenem: S <=1      -  Levofloxacin: S <=2      -  Meropenem: S <=1      -  Piperacillin/Tazobactam: S <=16      -  Tobramycin: S <=4      -  Trimethoprim/Sulfamethoxazole: S <=2/38      Method Type: PEPE    Culture - Blood (collected 05-16-19 @ 20:37)  Source: .Blood  Final Report (05-21-19 @ 21:00):    No growth at 5 days.    Culture - Blood (collected 05-16-19 @ 20:37)  Source: .Blood  Final Report (05-21-19 @ 21:00):    No growth at 5 days.    Culture - Urine (collected 05-16-19 @ 17:53)  Source: .Urine  Final Report (05-17-19 @ 17:09):    <10,000 CFU/ml Corynebacterium species

## 2019-05-26 NOTE — PROGRESS NOTE ADULT - SUBJECTIVE AND OBJECTIVE BOX
SUBJECTIVE: No acute overnight events reported. Patient seen at bedside this morning with no major complaints. Left-sided IR drain continues to have green thick viscous fluid, nonpurulent. Patient afebrile, vitals stable. Patient tolerating diet.        MEDICATIONS  (STANDING):  ampicillin/sulbactam  IVPB 3 Gram(s) IV Intermittent every 6 hours  atorvastatin 10 milliGRAM(s) Oral at bedtime  dextrose 5% + sodium chloride 0.45% 1000 milliLiter(s) (100 mL/Hr) IV Continuous <Continuous>  heparin  Injectable 5000 Unit(s) SubCutaneous every 12 hours  lidocaine   Patch 1 Patch Transdermal daily  montelukast 10 milliGRAM(s) Oral daily  multivitamin 1 Tablet(s) Oral daily  nystatin Powder 1 Application(s) Topical two times a day  pantoprazole  Injectable 40 milliGRAM(s) IV Push daily  saccharomyces boulardii 250 milliGRAM(s) Oral two times a day  senna 2 Tablet(s) Oral at bedtime  tamsulosin 0.4 milliGRAM(s) Oral at bedtime    MEDICATIONS  (PRN):  acetaminophen   Tablet .. 650 milliGRAM(s) Oral every 6 hours PRN Temp greater or equal to 38C (100.4F), Mild Pain (1 - 3)  HYDROmorphone  Injectable 0.5 milliGRAM(s) IV Push every 4 hours PRN Severe Pain (7 - 10)  ondansetron Injectable 4 milliGRAM(s) IV Push every 6 hours PRN Nausea and/or Vomiting  polyethylene glycol 3350 17 Gram(s) Oral daily PRN Constipation  traMADol 25 milliGRAM(s) Oral every 4 hours PRN Moderate Pain (4 - 6)      Vital Signs Last 24 Hrs  T(C): 36.7 (26 May 2019 00:08), Max: 36.9 (25 May 2019 07:57)  T(F): 98.1 (26 May 2019 00:08), Max: 98.5 (25 May 2019 07:57)  HR: 86 (26 May 2019 00:08) (86 - 90)  BP: 102/65 (26 May 2019 00:08) (102/65 - 115/73)  BP(mean): --  RR: 18 (26 May 2019 00:08) (18 - 18)  SpO2: 92% (26 May 2019 00:08) (92% - 94%)    PE  General: No acute distress  Respiratory: Nonlabored  Cardiovascular: S1/S2  Abdominal: Soft, nontender, No guarding or rebound. 10f drain in place to right upper quadrant, draining greenish fluid  : Smith in place  Extremities: Warm  Vascular: Radial pulse 2+, bilaterally    I&O's Detail    25 May 2019 07:01  -  26 May 2019 07:00  --------------------------------------------------------  IN:  Total IN: 0 mL    OUT:    Bulb: 140 mL    Stool: 1 mL    Voided: 400 mL  Total OUT: 541 mL    Total NET: -541 mL          LABS:                        10.1   10.2  )-----------( 330      ( 26 May 2019 07:24 )             31.0     05-25    140  |  104  |  37.0<H>  ----------------------------<  98  3.7   |  27.0  |  0.97    Ca    9.9      25 May 2019 13:28  Phos  2.8     05-25  Mg     1.7     05-25    TPro  5.4<L>  /  Alb  2.1<L>  /  TBili  0.8  /  DBili  x   /  AST  14  /  ALT  10  /  AlkPhos  88  05-24          RADIOLOGY & ADDITIONAL STUDIES:

## 2019-05-26 NOTE — PROGRESS NOTE ADULT - ASSESSMENT
89 years old male with PMH of Colon Cancer s/p Resection, Hiatal Hernia, HTN and HLD brought by family with right upper quadrant and right shoulder pain. As per patient, pain started 5 days prior on back side of right chest and then radiated to right shoulder and right upper quadrant. She pulled her right shoulder while getting into her son's truck 5 days ago and she was attributing the pain to it.  It is associated with nausea and decrease appetite. For 3 days, pain was mostly in right upper quadrant and flank area and it is getting worse so she was brought to ER.  Pt admitted. HIDA scan negative for cholecystitis, US of renal with abnormalities on the right urethral kidney area , CT with possible perinephric collection/urinoma.  Urology consulted and recommended IR drainage of fluid collection.  Patient s/p IR drainage 5/20/19 with thick green drainage. Patient work up has been inconclusive as to where is from. CT with Po contrast does not indicate intestinal leak. Per surgery continue to monitor/abs and drain. Urology doubts its a urinoma.    1) Right Flank / RUQ pain  improving   CT scan with possible perinephric collection/ urinoma  s/p IR drainage and fluid cx with Klebsiella Pneumoniae and Enterococcus Faecalis   Urology input appreciated   Invanz was switched to Unasyn and ID following  on probiotics while on abx  leukocytosis; resolved  CT scan with po contrast negative for GI leak  Creatinine level of body fluid (called lab and asked to add the test)  No intervention as per surgery  Discussed with Radiology - Upper GI Series won't help as patient had CT with Oral Contrast and it did not show any GI leak. Recommended to get Biliary Scan to rule out any leak - if negative then repeat CT Abdomen with PO contrast.     2) ARF on CRF unknown baseline   s/p iv hydration  improved  avoid nephrotoxic medications  Renal input appreciated    3) Hypercalcemia  etiology unclear, likely immobilization   avoid Ca+ supplements and Vit D  may need to consider treating with Aredia if upward trend continues as per Renal    4) Severe Protein Calorie Malnutrition  cont ensure with meals and MVI  nutritional recs appreciated     5) Cholelithiasis   HIDA scan 5/17/19, no cholecystitis   asymptomatic  GI input appreciated    6) Rotator Cuff Tear  Pain control  Right arm sling  Ortho Consult appreciated    7) HTN  Hold Enalapril and HCTZ for now    8) HLD  Continue Statins    9) GERD  Protonix 40 mg    10) Hypomagnesemia  Mg Sulfate 2 gm    DVT Prophylaxis -- Heparin 5000 Units    DNR/DNI   PT consulted and recommended Home PT.  Disposition: Unclear date at this time. 89 years old male with PMH of Colon Cancer s/p Resection, Hiatal Hernia, HTN and HLD brought by family with right upper quadrant and right shoulder pain. As per patient, pain started 5 days prior on back side of right chest and then radiated to right shoulder and right upper quadrant. She pulled her right shoulder while getting into her son's truck 5 days ago and she was attributing the pain to it.  It is associated with nausea and decrease appetite. For 3 days, pain was mostly in right upper quadrant and flank area and it is getting worse so she was brought to ER.  Pt admitted. HIDA scan negative for cholecystitis, US of renal with abnormalities on the right urethral kidney area , CT with possible perinephric collection/urinoma.  Urology consulted and recommended IR drainage of fluid collection.  Patient s/p IR drainage 5/20/19 with thick green drainage. Patient work up has been inconclusive as to where is from. CT with Po contrast does not indicate intestinal leak. Per surgery continue to monitor/abs and drain. Urology doubts its a urinoma.    1) Right Flank / RUQ pain  improving   CT scan with possible perinephric collection/ urinoma  s/p IR drainage and fluid cx with Klebsiella Pneumoniae and Enterococcus Faecalis   Urology input appreciated   Invanz was switched to Unasyn and ID following  on probiotics while on abx  leukocytosis; resolved  CT scan with po contrast negative for GI leak  Creatinine level of body fluid (called lab and asked to add the test)  No intervention as per surgery  Discussed with Radiology - Upper GI Series won't help as patient had CT with Oral Contrast and it did not show any GI leak. Recommended to get Biliary Scan to rule out any leak - if negative then repeat CT Abdomen with PO contrast.     2) MARY  s/p iv hydration  improved  avoid nephrotoxic medications  Renal input appreciated    3) Hypercalcemia  etiology unclear, likely immobilization   avoid Ca+ supplements and Vit D  may need to consider treating with Aredia if upward trend continues as per Renal    4) Severe Protein Calorie Malnutrition  cont ensure with meals and MVI  nutritional recs appreciated     5) Cholelithiasis   HIDA scan 5/17/19, no cholecystitis   asymptomatic  GI input appreciated    6) Rotator Cuff Tear  Pain control  Right arm sling  Ortho Consult appreciated    7) HTN  Hold Enalapril and HCTZ for now    8) HLD  Continue Statins    9) GERD  Protonix 40 mg    10) Hypomagnesemia  Mg Sulfate 2 gm    DVT Prophylaxis -- Heparin 5000 Units    DNR/DNI   PT consulted and recommended Home PT.  Disposition: Unclear date at this time.

## 2019-05-26 NOTE — PROGRESS NOTE ADULT - ASSESSMENT
Improved MARY due to renal hypoperfusion==> Off IVF   - avoid potential nephrotoxins  - monitor labs    R perinephric collection: s/p drainage 5/20/19  (?) GI microperforation and abscess  - continue IV antibiotics as per ID  - conservative care for now  Family does not want aggressive intervention   Surgery follow up noted     Hypercalcemia: etiology unclear, likely immobilization   Corrected serum Ca+ (for alb)= 11.6md/dL  - PTH 30   - avoid Ca+ supplements and Vit d  - follow labs; may need to consider treating with Aredia  if upward trend continues      Hypomag- 2 G IV

## 2019-05-26 NOTE — PROGRESS NOTE ADULT - ASSESSMENT
89y Female presents with right retroperitonal fluid collection      PLAN:  - No acute surgical intervention  - Continue IR drain  - Continue IV antibiotics  - Upper GI series cancelled by Radiology team (Feel this study isn't appropriate).   - We will continue to follow  - rest of care per primary team

## 2019-05-26 NOTE — PROGRESS NOTE ADULT - SUBJECTIVE AND OBJECTIVE BOX
NEPHROLOGY INTERVAL HPI/OVERNIGHT EVENTS:    seen earlier   Surgery follow up noted   No new events     MEDICATIONS  (STANDING):  ampicillin/sulbactam  IVPB 3 Gram(s) IV Intermittent every 6 hours  atorvastatin 10 milliGRAM(s) Oral at bedtime  dextrose 5% + sodium chloride 0.45% 1000 milliLiter(s) (100 mL/Hr) IV Continuous <Continuous>  heparin  Injectable 5000 Unit(s) SubCutaneous every 12 hours  lidocaine   Patch 1 Patch Transdermal daily  montelukast 10 milliGRAM(s) Oral daily  multivitamin 1 Tablet(s) Oral daily  nystatin Powder 1 Application(s) Topical two times a day  pantoprazole  Injectable 40 milliGRAM(s) IV Push daily  saccharomyces boulardii 250 milliGRAM(s) Oral two times a day  senna 2 Tablet(s) Oral at bedtime  tamsulosin 0.4 milliGRAM(s) Oral at bedtime    MEDICATIONS  (PRN):  acetaminophen   Tablet .. 650 milliGRAM(s) Oral every 6 hours PRN Temp greater or equal to 38C (100.4F), Mild Pain (1 - 3)  HYDROmorphone  Injectable 0.5 milliGRAM(s) IV Push every 4 hours PRN Severe Pain (7 - 10)  ondansetron Injectable 4 milliGRAM(s) IV Push every 6 hours PRN Nausea and/or Vomiting  polyethylene glycol 3350 17 Gram(s) Oral daily PRN Constipation  traMADol 25 milliGRAM(s) Oral every 4 hours PRN Moderate Pain (4 - 6)      Allergies    No Known Allergies    Intolerances      Vital Signs Last 24 Hrs  T(C): 37 (26 May 2019 07:59), Max: 37 (26 May 2019 07:59)  T(F): 98.6 (26 May 2019 07:59), Max: 98.6 (26 May 2019 07:59)  HR: 75 (26 May 2019 07:59) (75 - 86)  BP: 102/67 (26 May 2019 07:59) (102/65 - 102/67)  BP(mean): --  RR: 18 (26 May 2019 07:59) (18 - 18)  SpO2: 95% (26 May 2019 07:59) (92% - 95%)  Daily     Daily   I&O's Detail    25 May 2019 07:01  -  26 May 2019 07:00  --------------------------------------------------------  IN:  Total IN: 0 mL    OUT:    Bulb: 140 mL    Stool: 1 mL    Voided: 400 mL  Total OUT: 541 mL    Total NET: -541 mL        I&O's Summary    25 May 2019 07:01  -  26 May 2019 07:00  --------------------------------------------------------  IN: 0 mL / OUT: 541 mL / NET: -541 mL        PHYSICAL EXAM:    PHYSICAL EXAM:  GENERAL: Frail, elderly  NECK: Supple, no jvd  NERVOUS SYSTEM:  Alert & Oriented X3; non focal  CHEST/LUNG: Clear bilaterally  HEART: Regular rate and rhythm; No rub  ABDOMEN: Soft, Nontender, Nondistended; +BS, + right sided drain noted   EXT - Min edema   LABS:                        10.1   10.2  )-----------( 330      ( 26 May 2019 07:24 )             31.0     05-26    143  |  107  |  35.0<H>  ----------------------------<  97  3.9   |  27.0  |  0.96    Ca    9.8      26 May 2019 07:24  Phos  2.8     05-25  Mg     1.5     05-26    TPro  5.4<L>  /  Alb  2.1<L>  /  TBili  0.8  /  DBili  x   /  AST  14  /  ALT  10  /  AlkPhos  88  05-24        Magnesium, Serum: 1.5 mg/dL (05-26 @ 07:24)  Phosphorus Level, Serum: 2.8 mg/dL (05-25 @ 13:28)  Magnesium, Serum: 1.7 mg/dL (05-25 @ 13:28)          RADIOLOGY & ADDITIONAL TESTS:

## 2019-05-26 NOTE — PROGRESS NOTE ADULT - SUBJECTIVE AND OBJECTIVE BOX
Cholecystitis    HPI:  89 years old male with PMH of Colon Cancer s/p Resection, Hiatal Hernia, HTN and HLD brought by family with right upper quadrant and right shoulder pain. As per patient, pain started 5 days ago on back side of right chest and then radiated to right shoulder and right upper quadrant. She pulled her right shoulder while getting into her son's truck 5 days ago and she was attributing the pain to it.   It is associated with nausea and decrease appetite. For the last 3 days pain is mostly in right upper quadrant and it is getting worse so she was brought to ER.   Denies fever, urinary symptoms, diarrhea or vomiting. Denies shoulder injury in past. (16 May 2019 17:30)    Interval History:  Patient was seen and examined at bedside. RUQ and Right Shoulder Pain improving. Tolerating PO.   Denies chest pain, palpitations, shortness of breath, headache, dizziness, visual symptoms, nausea or vomiting.  No overnight issues.    ROS:  As per interval history otherwise unremarkable.    PHYSICAL EXAM:  Vital Signs   T(C): 36.9 (25 May 2019 07:57), Max: 36.9 (24 May 2019 15:20)  T(F): 98.5 (25 May 2019 07:57), Max: 98.5 (25 May 2019 07:57)  HR: 90 (25 May 2019 07:57) (82 - 90)  BP: 115/73 (25 May 2019 07:57) (110/67 - 132/73)  RR: 18 (25 May 2019 07:57) (18 - 18)  SpO2: 94% (25 May 2019 07:57) (94% - 95%)  General: Elderly female sitting in bed comfortably. No acute distress  HEENT: PERRLA. EOMI. Clear conjunctivae. Moist mucus membrane. Hearing aids in place.   Neck: Supple.   Chest: CTA bilaterally - no wheezing, rales or rhonchi.   Heart: Normal S1 & S2. RRR.   Abdomen: Soft. Non-tender. Non-distended. + BS. SEAN drain on right side.   Ext: No pedal edema. No calf tenderness   Neuro: Active and alert. No focal deficit. No speech disorder  Skin: Warm and Dry  Psychiatry: Normal mood and affect    MEDICATIONS  (STANDING):  ampicillin/sulbactam  IVPB 3 Gram(s) IV Intermittent every 6 hours  atorvastatin 10 milliGRAM(s) Oral at bedtime  dextrose 5% + sodium chloride 0.45% 1000 milliLiter(s) (100 mL/Hr) IV Continuous <Continuous>  heparin  Injectable 5000 Unit(s) SubCutaneous every 12 hours  lidocaine   Patch 1 Patch Transdermal daily  magnesium sulfate  IVPB 2 Gram(s) IV Intermittent once  montelukast 10 milliGRAM(s) Oral daily  multivitamin 1 Tablet(s) Oral daily  nystatin Powder 1 Application(s) Topical two times a day  pantoprazole  Injectable 40 milliGRAM(s) IV Push daily  saccharomyces boulardii 250 milliGRAM(s) Oral two times a day  senna 2 Tablet(s) Oral at bedtime  tamsulosin 0.4 milliGRAM(s) Oral at bedtime    MEDICATIONS  (PRN):  acetaminophen   Tablet .. 650 milliGRAM(s) Oral every 6 hours PRN Temp greater or equal to 38C (100.4F), Mild Pain (1 - 3)  HYDROmorphone  Injectable 0.5 milliGRAM(s) IV Push every 4 hours PRN Severe Pain (7 - 10)  ondansetron Injectable 4 milliGRAM(s) IV Push every 6 hours PRN Nausea and/or Vomiting  polyethylene glycol 3350 17 Gram(s) Oral daily PRN Constipation  traMADol 25 milliGRAM(s) Oral every 4 hours PRN Moderate Pain (4 - 6)    LABS:                        10.1   10.2  )-----------( 330      ( 26 May 2019 07:24 )             31.0     05-26    143  |  107  |  35.0<H>  ----------------------------<  97  3.9   |  27.0  |  0.96    Ca    9.8      26 May 2019 07:24  Phos  2.8     05-25  Mg     1.5     05-26    RADIOLOGY & ADDITIONAL STUDIES:  Reviewed

## 2019-05-27 NOTE — PROGRESS NOTE ADULT - SUBJECTIVE AND OBJECTIVE BOX
Cholecystitis    HPI:  89 years old male with PMH of Colon Cancer s/p Resection, Hiatal Hernia, HTN and HLD brought by family with right upper quadrant and right shoulder pain. As per patient, pain started 5 days ago on back side of right chest and then radiated to right shoulder and right upper quadrant. She pulled her right shoulder while getting into her son's truck 5 days ago and she was attributing the pain to it.   It is associated with nausea and decrease appetite. For the last 3 days pain is mostly in right upper quadrant and it is getting worse so she was brought to ER.   Denies fever, urinary symptoms, diarrhea or vomiting. Denies shoulder injury in past. (16 May 2019 17:30)    Interval History:  Patient was seen and examined at bedside around 9:30 am. Family at bedside. Feeling better. RUQ and Right Shoulder Pain improving. Tolerating PO.   Denies chest pain, palpitations, shortness of breath, headache, dizziness, visual symptoms, nausea or vomiting.    ROS:  As per interval history otherwise unremarkable.    PHYSICAL EXAM:  Vital Signs   T(C): 36.7 (27 May 2019 07:21), Max: 36.7 (27 May 2019 07:21)  T(F): 98 (27 May 2019 07:21), Max: 98 (27 May 2019 07:21)  HR: 86 (27 May 2019 07:21) (82 - 86)  BP: 116/74 (27 May 2019 07:21) (116/74 - 116/76)  RR: 18 (27 May 2019 07:21) (18 - 18)  SpO2: 93% (27 May 2019 07:21) (93% - 95%)  General: Elderly female sitting in bed comfortably. No acute distress  HEENT: PERRLA. EOMI. Clear conjunctivae. Moist mucus membrane. Hearing aids in place.   Neck: Supple.   Chest: CTA bilaterally - no wheezing, rales or rhonchi.   Heart: Normal S1 & S2. RRR.   Abdomen: Soft. Non-tender. Non-distended. + BS. SEAN drain on right side.   Ext: No pedal edema. No calf tenderness   Neuro: Active and alert. No focal deficit. No speech disorder  Skin: Warm and Dry  Psychiatry: Normal mood and affect    MEDICATIONS  (STANDING):  ampicillin/sulbactam  IVPB 3 Gram(s) IV Intermittent every 6 hours  atorvastatin 10 milliGRAM(s) Oral at bedtime  heparin  Injectable 5000 Unit(s) SubCutaneous every 12 hours  montelukast 10 milliGRAM(s) Oral daily  multivitamin 1 Tablet(s) Oral daily  nystatin Powder 1 Application(s) Topical two times a day  pantoprazole  Injectable 40 milliGRAM(s) IV Push daily  saccharomyces boulardii 250 milliGRAM(s) Oral two times a day  senna 2 Tablet(s) Oral at bedtime  tamsulosin 0.4 milliGRAM(s) Oral at bedtime    MEDICATIONS  (PRN):  acetaminophen   Tablet .. 650 milliGRAM(s) Oral every 6 hours PRN Temp greater or equal to 38C (100.4F), Mild Pain (1 - 3)  ondansetron Injectable 4 milliGRAM(s) IV Push every 6 hours PRN Nausea and/or Vomiting  polyethylene glycol 3350 17 Gram(s) Oral daily PRN Constipation    LABS:                        10.1   10.2  )-----------( 330      ( 26 May 2019 07:24 )             31.0     05-26    143  |  107  |  35.0<H>  ----------------------------<  97  3.9   |  27.0  |  0.96    Ca    9.8      26 May 2019 07:24  Mg     1.5     05-26      RADIOLOGY & ADDITIONAL STUDIES:  NM Hepatobiliary Imaging (05.26.19 @ 17:54)  Nonvisualization of gallbladder at 2 hours. Findings are compatible with acute cholecystitis. This finding is new as compared to prior study from 5/17/2019.    No evidence of bile leak.

## 2019-05-27 NOTE — PROGRESS NOTE ADULT - SUBJECTIVE AND OBJECTIVE BOX
[facility]  INFECTIOUS DISEASES AND INTERNAL MEDICINE at [place]  =======================================================  Elio James MD [facility] MD  Diplomates American Board of Internal Medicine and Infectious Diseases  Telephone [phone]  Fax            [phone]  =======================================================    [id number]  BETY TRINIDAD   follow up for:  fevers, right lower abd pain    no new issues.   still with drainage from the right sided drain    ===================================================  REVIEW OF SYSTEMS:  as above  all other ROS negative    =======================================================  Allergies  No Known Allergies    Antibiotics:  ampicillin/sulbactam  IVPB 3 Gram(s) IV Intermittent every 6 hours    ======================================================  Physical Exam:  ============  T(F): 98 (27 May 2019 07:21), Max: 98 (27 May 2019 07:21)  HR: 86 (27 May 2019 07:21)  BP: 116/74 (27 May 2019 07:21)  RR: 18 (27 May 2019 07:21)  SpO2: 93% (27 May 2019 07:21) (93% - 95%)  temp max in last 48H T(F): , Max: 98.6 (05-26-19 @ 07:59)    General:  No acute distress. FRAIL    Eye: Pupils are equal, round and reactive to light, Extraocular movements are intact, Normal conjunctiva.  HENT: Normocephalic, Oral mucosa is dry  Neck: Supple, No lymphadenopathy.  Respiratory: Lungs are clear to auscultation, Respirations are non-labored.  Cardiovascular: Normal rate, Regular rhythm,   Gastrointestinal: Soft,  + bowel sounds, + right sided drain SEAN in place with copious green fluid  Genitourinary: No costovertebral angle tenderness.  Lymphatics: No lymphadenopathy neck,   Musculoskeletal: Normal range of motion, Normal strength.  Integumentary: No rash.  Neurologic: AWAKE     =======================================================  Labs:                        10.1   10.2  )-----------( 330      ( 26 May 2019 07:24 )             31.0       WBC Count: 10.2 K/uL (05-26-19 @ 07:24)  WBC Count: 11.4 K/uL (05-25-19 @ 13:28)  WBC Count: 11.7 K/uL (05-24-19 @ 11:06)      05-26    143  |  107  |  35.0<H>  ----------------------------<  97  3.9   |  27.0  |  0.96    Ca    9.8      26 May 2019 07:24  Phos  2.8     05-25  Mg     1.5     05-26        Culture - Acid Fast - Body Fluid w/Smear (collected 05-20-19 @ 18:27)  Source: .Body Fluid    Culture - Abscess with Gram Stain (collected 05-20-19 @ 12:16)  Source: .Abscess  Gram Stain (05-20-19 @ 15:09):    Few WBC's    No organisms seen  Final Report (05-25-19 @ 08:52):    Moderate Klebsiella pneumoniae    Few Enterococcus faecalis  Organism: Klebsiella pneumoniae  Enterococcus faecalis (05-25-19 @ 08:52)  Organism: Enterococcus faecalis (05-25-19 @ 08:52)    Sensitivities:      -  Ampicillin: S <=2 Predicts results to ampicillin/sulbactam, amoxacillin-clavulanate and  piperacillin-tazobactam.      -  Tetra/Doxy: S <=4      -  Vancomycin: S 2      Method Type: PEPE  Organism: Klebsiella pneumoniae (05-25-19 @ 08:52)    Sensitivities:      -  Amikacin: S <=16      -  Ampicillin: R >16 These ampicillin results predict results for amoxicillin      -  Ampicillin/Sulbactam: S <=8/4      -  Aztreonam: S <=4      -  Cefazolin: S <=8      -  Cefepime: S <=4      -  Cefoxitin: S <=8      -  Ceftriaxone: S <=1 Enterobacter, Citrobacter, and Serratia may develop resistance during prolonged therapy      -  Ciprofloxacin: S <=1      -  Ertapenem: S <=1      -  Gentamicin: S <=4      -  Imipenem: S <=1      -  Levofloxacin: S <=2      -  Meropenem: S <=1      -  Piperacillin/Tazobactam: S <=16      -  Tobramycin: S <=4      -  Trimethoprim/Sulfamethoxazole: S <=2/38      Method Type: PEPE    Culture - Blood (collected 05-16-19 @ 20:37)  Source: .Blood  Final Report (05-21-19 @ 21:00):    No growth at 5 days.    Culture - Blood (collected 05-16-19 @ 20:37)  Source: .Blood  Final Report (05-21-19 @ 21:00):    No growth at 5 days.    Culture - Urine (collected 05-16-19 @ 17:53)  Source: .Urine  Final Report (05-17-19 @ 17:09):    <10,000 CFU/ml Corynebacterium species

## 2019-05-27 NOTE — PROGRESS NOTE ADULT - ASSESSMENT
89 years old male with PMH of Colon Cancer s/p Resection, Hiatal Hernia, HTN and HLD brought by family with right upper quadrant and right shoulder pain. As per patient, pain started 5 days prior on back side of right chest and then radiated to right shoulder and right upper quadrant. She pulled her right shoulder while getting into her son's truck 5 days ago and she was attributing the pain to it.  It is associated with nausea and decrease appetite. For 3 days, pain was mostly in right upper quadrant and flank area and it is getting worse so she was brought to ER.  Pt admitted. HIDA scan negative for cholecystitis, US of renal with abnormalities on the right urethral kidney area , CT with possible perinephric collection/urinoma.  Urology consulted and recommended IR drainage of fluid collection.  Patient s/p IR drainage 5/20/19 with thick green drainage. Patient work up has been inconclusive as to where is from. CT with Po contrast does not indicate intestinal leak. Per surgery continue to monitor/abs and drain. Urology doubts its a urinoma.    1) Right Flank / RUQ pain  improved  CT scan with possible perinephric collection/ urinoma  s/p IR drainage and fluid cx with Klebsiella Pneumoniae and Enterococcus Faecalis   Invanz was switched to Unasyn and ID following  on probiotics while on abx  leukocytosis; resolved  CT scan with po contrast negative for GI leak  Creatinine level of body fluid (called lab and asked to add the test)  Urology input appreciated: does not look like Urinoma  No intervention as per surgery  Discussed with Radiology - Upper GI Series won't help as patient had CT with Oral Contrast and it did not show any GI leak.   2) Acute Cholecystitis  - Initial HIDA scan on 5/17/19 did not show cholecystitis   - Patient is already on antibiotics  - Tolerating PO  - Pain in RUQ has improved  - Will discuss with IR in am for Cholecystostomy Tube  3) MARY  s/p iv hydration  improved  avoid nephrotoxic medications  Renal input appreciated  4) Hypercalcemia  etiology unclear, likely immobilization   avoid Ca+ supplements and Vit D  may need to consider treating with Aredia if upward trend continues as per Renal  5) Severe Protein Calorie Malnutrition  cont ensure with meals and MVI  nutritional recs appreciated   6) Rotator Cuff Tear  Pain control  Right arm sling  Ortho Consult appreciated  7) HTN  Hold Enalapril and HCTZ for now  8) HLD  Continue Statins  9) GERD  Protonix 40 mg  10) Hypomagnesemia  Replaced    DVT Prophylaxis -- Heparin 5000 Units    DNR/DNI.    Disposition: [facility] likely in 48 hours.

## 2019-05-27 NOTE — PROGRESS NOTE ADULT - SUBJECTIVE AND OBJECTIVE BOX
NEPHROLOGY INTERVAL HPI/OVERNIGHT EVENTS:    Examined  Family at bedside  Feeling better    MEDICATIONS  (STANDING):  ampicillin/sulbactam  IVPB 3 Gram(s) IV Intermittent every 6 hours  atorvastatin 10 milliGRAM(s) Oral at bedtime  heparin  Injectable 5000 Unit(s) SubCutaneous every 12 hours  montelukast 10 milliGRAM(s) Oral daily  multivitamin 1 Tablet(s) Oral daily  nystatin Powder 1 Application(s) Topical two times a day  pantoprazole  Injectable 40 milliGRAM(s) IV Push daily  saccharomyces boulardii 250 milliGRAM(s) Oral two times a day  senna 2 Tablet(s) Oral at bedtime  tamsulosin 0.4 milliGRAM(s) Oral at bedtime    MEDICATIONS  (PRN):  acetaminophen   Tablet .. 650 milliGRAM(s) Oral every 6 hours PRN Temp greater or equal to 38C (100.4F), Mild Pain (1 - 3)  ondansetron Injectable 4 milliGRAM(s) IV Push every 6 hours PRN Nausea and/or Vomiting  polyethylene glycol 3350 17 Gram(s) Oral daily PRN Constipation      Allergies    No Known Allergies    Intolerances        Vital Signs Last 24 Hrs  T(C): 36.7 (27 May 2019 07:21), Max: 36.7 (27 May 2019 07:21)  T(F): 98 (27 May 2019 07:21), Max: 98 (27 May 2019 07:21)  HR: 86 (27 May 2019 07:21) (82 - 86)  BP: 116/74 (27 May 2019 07:21) (116/74 - 116/76)  BP(mean): --  RR: 18 (27 May 2019 07:21) (18 - 18)  SpO2: 93% (27 May 2019 07:21) (93% - 95%)  Daily     Daily     PHYSICAL EXAM:  GENERAL: Frail, elderly  NECK: Supple, no jvd  NERVOUS SYSTEM:  Alert & Oriented X3; non focal  CHEST/LUNG: Clear bilaterally  HEART: Regular rate and rhythm; No rub  ABDOMEN: Soft, Nontender, Nondistended; +BS, + right sided drain noted   EXT - Min edema     LABS:                        10.1   10.2  )-----------( 330      ( 26 May 2019 07:24 )             31.0     05-26    143  |  107  |  35.0<H>  ----------------------------<  97  3.9   |  27.0  |  0.96    Ca    9.8      26 May 2019 07:24  Phos  2.8     05-25  Mg     1.5     05-26                  RADIOLOGY & ADDITIONAL TESTS:

## 2019-05-27 NOTE — PROGRESS NOTE ADULT - ASSESSMENT
This 89 years old male with PMH of Colon Cancer s/p Resection, Hiatal Hernia, HTN and HLD brought by family with right upper quadrant and right shoulder pain    had sonographic moy sign, but negative HIDA  right sided flank pain  CT scan with large collection,  s/p placement of IR drain  ? colonic source such as possible microperforation, then closed.     Cultures sent  GNR --> klebsiella, also enterococcus faecalis SS to ampicillin  CT scan without extraluminal contrast    on Unasyn; will continue    Continue to maintain drain.

## 2019-05-27 NOTE — PROGRESS NOTE ADULT - SUBJECTIVE AND OBJECTIVE BOX
HPI/OVERNIGHT EVENTS: Patient seen and examined at bedside this AM. No acute events overnight per nursing reports. Denies fever, chills, nausea, vomitting, chest pain, SOB, dizziness, abd pain or any other concerning symptoms    Vital Signs Last 24 Hrs  T(C): 36.5 (27 May 2019 00:29), Max: 37 (26 May 2019 07:59)  T(F): 97.7 (27 May 2019 00:29), Max: 98.6 (26 May 2019 07:59)  HR: 82 (27 May 2019 00:29) (75 - 82)  BP: 116/76 (27 May 2019 00:29) (102/67 - 116/76)  BP(mean): --  RR: 18 (27 May 2019 00:29) (18 - 18)  SpO2: 95% (27 May 2019 00:29) (95% - 95%)    I&O's Detail    25 May 2019 07:01  -  26 May 2019 07:00  --------------------------------------------------------  IN:  Total IN: 0 mL    OUT:    Bulb: 140 mL    Stool: 1 mL    Voided: 400 mL  Total OUT: 541 mL    Total NET: -541 mL      26 May 2019 07:01  -  27 May 2019 02:25  --------------------------------------------------------  IN:  Total IN: 0 mL    OUT:    Bulb: 30 mL  Total OUT: 30 mL    Total NET: -30 mL      PE  General: No acute distress  Respiratory: Nonlabored  Cardiovascular: S1/S2  Abdominal: Soft, nontender, No guarding or rebound. 10f drain in place to right upper quadrant, draining greenish fluid  : Smith in place  Extremities: Warm  Vascular: Radial pulse 2+, bilaterally    LABS:                        10.1   10.2  )-----------( 330      ( 26 May 2019 07:24 )             31.0     05-26    143  |  107  |  35.0<H>  ----------------------------<  97  3.9   |  27.0  |  0.96    Ca    9.8      26 May 2019 07:24  Phos  2.8     05-25  Mg     1.5     05-26            MEDICATIONS  (STANDING):  ampicillin/sulbactam  IVPB 3 Gram(s) IV Intermittent every 6 hours  atorvastatin 10 milliGRAM(s) Oral at bedtime  dextrose 5% + sodium chloride 0.45% 1000 milliLiter(s) (100 mL/Hr) IV Continuous <Continuous>  heparin  Injectable 5000 Unit(s) SubCutaneous every 12 hours  lidocaine   Patch 1 Patch Transdermal daily  montelukast 10 milliGRAM(s) Oral daily  multivitamin 1 Tablet(s) Oral daily  nystatin Powder 1 Application(s) Topical two times a day  pantoprazole  Injectable 40 milliGRAM(s) IV Push daily  saccharomyces boulardii 250 milliGRAM(s) Oral two times a day  senna 2 Tablet(s) Oral at bedtime  tamsulosin 0.4 milliGRAM(s) Oral at bedtime    MEDICATIONS  (PRN):  acetaminophen   Tablet .. 650 milliGRAM(s) Oral every 6 hours PRN Temp greater or equal to 38C (100.4F), Mild Pain (1 - 3)  ondansetron Injectable 4 milliGRAM(s) IV Push every 6 hours PRN Nausea and/or Vomiting  polyethylene glycol 3350 17 Gram(s) Oral daily PRN Constipation      MICRO:   Cultures     STUDIES:   EKG, CXR, U/S, CT, MRI

## 2019-05-27 NOTE — PROGRESS NOTE ADULT - ASSESSMENT
Improved MARY due to renal hypoperfusion==> Off IVF   Renal function better   - avoid potential nephrotoxins  - monitor labs    R perinephric collection: s/p drainage 5/20/19  (?) GI microperforation and abscess  - continue IV antibiotics as per ID  - conservative care for now  Family does not want aggressive intervention   Surgery follow up noted     Will follow

## 2019-05-27 NOTE — PROGRESS NOTE ADULT - ASSESSMENT
89y Female presents with right retroperitonal fluid collection      PLAN:  - No acute surgical intervention  - Continue IR drain  - Continue IV antibiotics  - We will continue to follow  - rest of care per primary team

## 2019-05-28 NOTE — PROGRESS NOTE ADULT - SUBJECTIVE AND OBJECTIVE BOX
NEPHROLOGY INTERVAL HPI/OVERNIGHT EVENTS:    Examined    MEDICATIONS  (STANDING):  ampicillin/sulbactam  IVPB 3 Gram(s) IV Intermittent every 6 hours  atorvastatin 10 milliGRAM(s) Oral at bedtime  heparin  Injectable 5000 Unit(s) SubCutaneous every 12 hours  lactated ringers. 1000 milliLiter(s) (40 mL/Hr) IV Continuous <Continuous>  magnesium oxide 400 milliGRAM(s) Oral three times a day with meals  montelukast 10 milliGRAM(s) Oral daily  multivitamin 1 Tablet(s) Oral daily  nystatin Powder 1 Application(s) Topical two times a day  pantoprazole  Injectable 40 milliGRAM(s) IV Push daily  saccharomyces boulardii 250 milliGRAM(s) Oral two times a day  senna 2 Tablet(s) Oral at bedtime  tamsulosin 0.4 milliGRAM(s) Oral at bedtime    MEDICATIONS  (PRN):  acetaminophen   Tablet .. 650 milliGRAM(s) Oral every 6 hours PRN Temp greater or equal to 38C (100.4F), Mild Pain (1 - 3)  ondansetron Injectable 4 milliGRAM(s) IV Push every 6 hours PRN Nausea and/or Vomiting  oxyCODONE    IR 5 milliGRAM(s) Oral every 6 hours PRN Severe Pain (7 - 10)  polyethylene glycol 3350 17 Gram(s) Oral daily PRN Constipation      Allergies    No Known Allergies    Intolerances        Vital Signs Last 24 Hrs  T(C): 36.7 (28 May 2019 08:00), Max: 37.1 (27 May 2019 23:22)  T(F): 98.1 (28 May 2019 08:00), Max: 98.7 (27 May 2019 23:22)  HR: 92 (28 May 2019 15:05) (86 - 94)  BP: 111/58 (28 May 2019 15:05) (102/62 - 115/70)  BP(mean): --  RR: 18 (28 May 2019 08:00) (18 - 18)  SpO2: 94% (28 May 2019 08:00) (94% - 97%)  Daily     Daily     PHYSICAL EXAM:  GENERAL: Frail, elderly  NECK: Supple, no jvd  NERVOUS SYSTEM:  Alert & Oriented X3; non focal  CHEST/LUNG: Clear bilaterally  HEART: Regular rate and rhythm; No rub  ABDOMEN: Soft, Nontender, Nondistended; +BS, + right sided drain noted   EXT - Min edema     LABS:    05-28    145  |  107  |  28.0<H>  ----------------------------<  103  4.0   |  29.0  |  1.03    Ca    9.9      28 May 2019 08:47  Mg     1.7     05-28          Magnesium, Serum: 1.7 mg/dL (05-28 @ 08:47)          RADIOLOGY & ADDITIONAL TESTS:

## 2019-05-28 NOTE — PROGRESS NOTE ADULT - ASSESSMENT
This 89 years old male with PMH of Colon Cancer s/p Resection, Hiatal Hernia, HTN and HLD brought by family with right upper quadrant and right shoulder pain    had sonographic moy sign, but negative HIDA  right sided flank pain  CT scan with large collection,  s/p placement of IR drain   ? colonic source such as possible microperforation, then closed.   ? biliary perforation then closed    Cultures sent  GNR --> klebsiella, also enterococcus faecalis SS to ampicillin  CT scan without extraluminal contrast  s/p placement of a percutaneous biliary drain    on Unasyn; will continue    Continue to maintain drain.

## 2019-05-28 NOTE — CHART NOTE - NSCHARTNOTEFT_GEN_A_CORE
Source: Patient [x ]  Family [ ]   other [ x] EMR  Pt with R flank ans RUQ pain, cholecystitis, MARY,rotator cuff tear, HTN, HLD  Current Diet: Diet, Regular:   Supplement Feeding Modality:  Oral  Ensure Enlive Cans or Servings Per Day:  1       Frequency:  Three Times a day (05-19-19 @ 10:36)      Patient reports [ ] nausea  [ ] vomiting [ ] diarrhea [ ] constipation  [ ]chewing problems [ ] swallowing issues  [ ] other:     PO intake:  < 50% [ ]   50-75%  [x ]   %  [ ]  other :    Source for PO intake [x ] Patient [ ] family [ ] chart [ ] staff [ ] other    Enteral /Parenteral Nutrition:     Current Weight:  84.3 kg    % Weight Change   trending up    Pertinent Medications: MEDICATIONS  (STANDING):  ampicillin/sulbactam  IVPB 3 Gram(s) IV Intermittent every 6 hours  atorvastatin 10 milliGRAM(s) Oral at bedtime  heparin  Injectable 5000 Unit(s) SubCutaneous every 12 hours  lactated ringers. 1000 milliLiter(s) (40 mL/Hr) IV Continuous <Continuous>  magnesium oxide 400 milliGRAM(s) Oral three times a day with meals  montelukast 10 milliGRAM(s) Oral daily  multivitamin 1 Tablet(s) Oral daily  nystatin Powder 1 Application(s) Topical two times a day  pantoprazole  Injectable 40 milliGRAM(s) IV Push daily  saccharomyces boulardii 250 milliGRAM(s) Oral two times a day  senna 2 Tablet(s) Oral at bedtime  tamsulosin 0.4 milliGRAM(s) Oral at bedtime    MEDICATIONS  (PRN):  acetaminophen   Tablet .. 650 milliGRAM(s) Oral every 6 hours PRN Temp greater or equal to 38C (100.4F), Mild Pain (1 - 3)  ondansetron Injectable 4 milliGRAM(s) IV Push every 6 hours PRN Nausea and/or Vomiting  oxyCODONE    IR 5 milliGRAM(s) Oral every 6 hours PRN Severe Pain (7 - 10)  polyethylene glycol 3350 17 Gram(s) Oral daily PRN Constipation    Pertinent Labs:   05-28 Na145 mmol/L Glu 103 mg/dL K+ 4.0 mmol/L Cr  1.03 mg/dL BUN 28.0 mg/dL<H> Phos n/a   Alb n/a   PAB n/a               Skin: none noted    Nutrition focused physical exam conducted - found signs of malnutrition [ ]absent [ x]present    Subcutaneous fat loss: [x ] Orbital fat pads region, [ x]Buccal fat region, [ ]Triceps region,  [ ]Ribs region    Muscle wasting: [x ]Temples region, [ ]Clavicle region, [ ]Shoulder region, [ ]Scapula region, [ ]Interosseous region,  [ ]thigh region, [ ]Calf region    Estimated Needs:   [x ] no change since previous assessment  [ ] recalculated:     Current Nutrition Diagnosis:· Nutrient: Malnutrition; Severe (acute)  · Etiology: related to inadequate protein - energy intake in the setting of cholecystitis  · Signs/Symptoms: as evidenced by pt meeting </- to 50% energy needs > 5 days with mod/sev muscle/fat wasting.      Recommendations:   continue diet with ensure    Monitoring and Evaluation:   [ x] PO intake [ x] Tolerance to diet prescription [X] Weights  [X] Follow up per protocol [X] Labs:

## 2019-05-28 NOTE — PROGRESS NOTE ADULT - SUBJECTIVE AND OBJECTIVE BOX
Vascular & Interventional Radiology Post-Procedure Note    Pre-Procedure Diagnosis: acute cholecystitis   Post-Procedure Diagnosis: Same as pre.      : sindy  Assistant(s): ____    Procedure Details/Findings: 8 f drain placed into GB.  20 cc dark bile with debris aspirated     Access (if applicable): ____    Complications: 0  Estimated Blood Loss: Minimal  Anethesia: 1% Lidocane SQ  Specimen: cx  Contrast: 0  Sedation: mod sedation   Patient Condition/Disposition: Stable    Plan:     f/u drainage  flush daily to maintain patency (ordered)  eval drain 4-6 weeks as opt

## 2019-05-28 NOTE — PROGRESS NOTE ADULT - SUBJECTIVE AND OBJECTIVE BOX
Cholecystitis    HPI:  89 years old male with PMH of Colon Cancer s/p Resection, Hiatal Hernia, HTN and HLD brought by family with right upper quadrant and right shoulder pain. As per patient, pain started 5 days ago on back side of right chest and then radiated to right shoulder and right upper quadrant. She pulled her right shoulder while getting into her son's truck 5 days ago and she was attributing the pain to it.   It is associated with nausea and decrease appetite. For the last 3 days pain is mostly in right upper quadrant and it is getting worse so she was brought to ER.   Denies fever, urinary symptoms, diarrhea or vomiting. Denies shoulder injury in past. (16 May 2019 17:30)    Interval History:  Patient was seen and examined at bedside around 8 am. Doing well. Family at bedside. RUQ and Right Shoulder has improved. Tolerating PO.   Denies chest pain, palpitations, shortness of breath, headache, dizziness, visual symptoms, nausea or vomiting.    ROS:  As per interval history otherwise unremarkable.    PHYSICAL EXAM:  Vital Signs   T(C): 36.7 (28 May 2019 08:00), Max: 37.1 (27 May 2019 23:22)  T(F): 98.1 (28 May 2019 08:00), Max: 98.7 (27 May 2019 23:22)  HR: 86 (28 May 2019 08:00) (86 - 97)  BP: 115/70 (28 May 2019 08:00) (97/65 - 115/70)  RR: 18 (28 May 2019 08:00) (18 - 18)  SpO2: 94% (28 May 2019 08:00) (94% - 97%)  General: Elderly female sitting in bed comfortably. No acute distress.  HEENT: PERRLA. EOMI. Clear conjunctivae. Moist mucus membrane. Hearing aids in place.   Neck: Supple.   Chest: CTA bilaterally - no wheezing, rales or rhonchi.   Heart: Normal S1 & S2. RRR.   Abdomen: Soft. Non-tender. Non-distended. + BS. SEAN drain on right side.   Ext: No pedal edema. No calf tenderness   Neuro: Active and alert. No focal deficit. No speech disorder  Skin: Warm and Dry  Psychiatry: Normal mood and affect    MEDICATIONS  (STANDING):  ampicillin/sulbactam  IVPB 3 Gram(s) IV Intermittent every 6 hours  atorvastatin 10 milliGRAM(s) Oral at bedtime  heparin  Injectable 5000 Unit(s) SubCutaneous every 12 hours  lactated ringers. 1000 milliLiter(s) (40 mL/Hr) IV Continuous <Continuous>  montelukast 10 milliGRAM(s) Oral daily  multivitamin 1 Tablet(s) Oral daily  nystatin Powder 1 Application(s) Topical two times a day  pantoprazole  Injectable 40 milliGRAM(s) IV Push daily  saccharomyces boulardii 250 milliGRAM(s) Oral two times a day  senna 2 Tablet(s) Oral at bedtime  tamsulosin 0.4 milliGRAM(s) Oral at bedtime    MEDICATIONS  (PRN):  acetaminophen   Tablet .. 650 milliGRAM(s) Oral every 6 hours PRN Temp greater or equal to 38C (100.4F), Mild Pain (1 - 3)  ondansetron Injectable 4 milliGRAM(s) IV Push every 6 hours PRN Nausea and/or Vomiting  polyethylene glycol 3350 17 Gram(s) Oral daily PRN Constipation    LABS:    05-28    145  |  107  |  28.0<H>  ----------------------------<  103  4.0   |  29.0  |  1.03    Ca    9.9      28 May 2019 08:47  Mg     1.7     05-28      RADIOLOGY & ADDITIONAL STUDIES:  NM Hepatobiliary Imaging (05.26.19 @ 17:54)  Nonvisualization of gallbladder at 2 hours. Findings are compatible with acute cholecystitis. This finding is new as compared to prior study from 5/17/2019.    No evidence of bile leak.

## 2019-05-28 NOTE — PROGRESS NOTE ADULT - SUBJECTIVE AND OBJECTIVE BOX
[facility]  INFECTIOUS DISEASES AND INTERNAL MEDICINE at [place]  =======================================================  Elio James MD [facility] Rendon MD  Diplomates American Board of Internal Medicine and Infectious Diseases  Telephone [phone]  Fax            [phone]  =======================================================    [id number]  BETY [place]   follow up for:  fevers, right lower abd pain    no new issues.   still with drainage from the right sided drain  new placement of a RIGHT perc biliary drain 5/28/19    ===================================================  REVIEW OF SYSTEMS:  as above  all other ROS negative    =======================================================  Allergies  No Known Allergies    Antibiotics:  ampicillin/sulbactam  IVPB 3 Gram(s) IV Intermittent every 6 hours    ======================================================  Physical Exam:  ============  T(F): 98.1 (28 May 2019 08:00), Max: 98.7 (27 May 2019 23:22)  HR: 92 (28 May 2019 15:05)  BP: 111/58 (28 May 2019 15:05)  RR: 18 (28 May 2019 08:00)  SpO2: 94% (28 May 2019 08:00) (94% - 97%)  temp max in last 48H T(F): , Max: 98.7 (05-27-19 @ 23:22)    General:  No acute distress. FRAIL    Eye: Pupils are equal, round and reactive to light, Extraocular movements are intact, Normal conjunctiva.  HENT: Normocephalic, Oral mucosa is dry  Neck: Supple, No lymphadenopathy.  Respiratory: Lungs are clear to auscultation, Respirations are non-labored.  Cardiovascular: Normal rate, Regular rhythm,   Gastrointestinal: Soft,  + bowel sounds, + right sided drain SEAN in place with copious green fluid  right biliary drain with clear bilious fluid  Genitourinary: No costovertebral angle tenderness.  Lymphatics: No lymphadenopathy neck,   Musculoskeletal: Normal range of motion, Normal strength.  Integumentary: No rash.  Neurologic: AWAKE     =======================================================  Labs:      WBC Count: 10.2 K/uL (05-26-19 @ 07:24)  WBC Count: 11.4 K/uL (05-25-19 @ 13:28)  WBC Count: 11.7 K/uL (05-24-19 @ 11:06)      05-28    145  |  107  |  28.0<H>  ----------------------------<  103  4.0   |  29.0  |  1.03    Ca    9.9      28 May 2019 08:47  Mg     1.7     05-28        Culture - Acid Fast - Body Fluid w/Smear (collected 05-20-19 @ 18:27)  Source: .Body Fluid    Culture - Abscess with Gram Stain (collected 05-20-19 @ 12:16)  Source: .Abscess  Gram Stain (05-20-19 @ 15:09):    Few WBC's    No organisms seen  Final Report (05-25-19 @ 08:52):    Moderate Klebsiella pneumoniae    Few Enterococcus faecalis  Organism: Klebsiella pneumoniae  Enterococcus faecalis (05-25-19 @ 08:52)  Organism: Enterococcus faecalis (05-25-19 @ 08:52)    Sensitivities:      -  Ampicillin: S <=2 Predicts results to ampicillin/sulbactam, amoxacillin-clavulanate and  piperacillin-tazobactam.      -  Tetra/Doxy: S <=4      -  Vancomycin: S 2      Method Type: PEPE  Organism: Klebsiella pneumoniae (05-25-19 @ 08:52)    Sensitivities:      -  Amikacin: S <=16      -  Ampicillin: R >16 These ampicillin results predict results for amoxicillin      -  Ampicillin/Sulbactam: S <=8/4      -  Aztreonam: S <=4      -  Cefazolin: S <=8      -  Cefepime: S <=4      -  Cefoxitin: S <=8      -  Ceftriaxone: S <=1 Enterobacter, Citrobacter, and Serratia may develop resistance during prolonged therapy      -  Ciprofloxacin: S <=1      -  Ertapenem: S <=1      -  Gentamicin: S <=4      -  Imipenem: S <=1      -  Levofloxacin: S <=2      -  Meropenem: S <=1      -  Piperacillin/Tazobactam: S <=16      -  Tobramycin: S <=4      -  Trimethoprim/Sulfamethoxazole: S <=2/38      Method Type: PEPE    Culture - Blood (collected 05-16-19 @ 20:37)  Source: .Blood  Final Report (05-21-19 @ 21:00):    No growth at 5 days.    Culture - Blood (collected 05-16-19 @ 20:37)  Source: .Blood  Final Report (05-21-19 @ 21:00):    No growth at 5 days.    Culture - Urine (collected 05-16-19 @ 17:53)  Source: .Urine  Final Report (05-17-19 @ 17:09):    <10,000 CFU/ml Corynebacterium species

## 2019-05-28 NOTE — PROGRESS NOTE ADULT - SUBJECTIVE AND OBJECTIVE BOX
Vascular & Interventional Radiology Pre-Procedure Note    Procedure Name:  cholecystostomy tube placement     HPI: 89y Female with acute cholecystitis.      Allergies:   Medications (Abx/Cardiac/Anticoagulation/Blood Products)  ampicillin/sulbactam  IVPB: 200 mL/Hr IV Intermittent (05-28 @ 05:07)  heparin  Injectable: 5000 Unit(s) SubCutaneous (05-27 @ 22:56)  tamsulosin: 0.4 milliGRAM(s) Oral (05-27 @ 22:57)    Data:    T(C): 36.7  HR: 86  BP: 115/70  RR: 18  SpO2: 94%    05-28    145  |  107  |  28.0<H>  ----------------------------<  103  4.0   |  29.0  |  1.03    Ca    9.9      28 May 2019 08:47  Mg     1.7     05-28              Imaging: HIDA + for acute choley     Plan:   -89y Female presents for cholecystostomy tube placement   -Risks/Benefits/alternatives explained with the patient and/or healthcare proxy and witnessed informed consent obtained.

## 2019-05-28 NOTE — PROGRESS NOTE ADULT - ASSESSMENT
89y Female presents with right retroperitonal fluid collection now s/p 8 f drain placed into GB.  20 cc dark bile with debris aspirated per IR.       PLAN:  - No acute surgical intervention  - Continue IR drain  - Continue IV antibiotics  - Will follow CT A/P w/ PO contrast  - rest of care per primary team

## 2019-05-28 NOTE — PROGRESS NOTE ADULT - ASSESSMENT
89 years old male with PMH of Colon Cancer s/p Resection, Hiatal Hernia, HTN and HLD brought by family with right upper quadrant and right shoulder pain. As per patient, pain started 5 days prior on back side of right chest and then radiated to right shoulder and right upper quadrant. She pulled her right shoulder while getting into her son's truck 5 days ago and she was attributing the pain to it.  It is associated with nausea and decrease appetite. For 3 days, pain was mostly in right upper quadrant and flank area and it is getting worse so she was brought to ER.  Pt admitted. HIDA scan negative for cholecystitis, US of renal with abnormalities on the right urethral kidney area , CT with possible perinephric collection/urinoma.  Urology consulted and recommended IR drainage of fluid collection.  Patient s/p IR drainage 5/20/19 with thick green drainage. Patient work up has been inconclusive as to where is from. CT with Po contrast does not indicate intestinal leak. Per surgery continue to monitor/abs and drain. Urology doubts its a urinoma.    1) Right Flank / RUQ pain  improved  CT scan with right perinephric collection  s/p IR drainage   fluid cx with Klebsiella Pneumoniae and Enterococcus Faecalis   Invanz was switched to Unasyn and ID following  on probiotics while on abx  leukocytosis; resolved  CT scan with po contrast negative for GI leak  Creatinine level of body fluid (called lab and asked to add the test)  Urology input appreciated: does not look like Urinoma  No intervention as per surgery  Discussed with Radiology - Upper GI Series won't help as patient had CT with Oral Contrast and it did not show any GI leak.   2) Acute Cholecystitis  - Initial HIDA scan on 5/17/19 did not show cholecystitis   - Patient is already on antibiotics  - Tolerating PO  - Pain in RUQ has improved  - Discussed with IR; Cholecystostomy Tube today  - Continue NPO  3) MARY  s/p iv hydration  improved  avoid nephrotoxic medications  Renal input appreciated  4) Hypercalcemia  etiology unclear, likely immobilization   avoid Ca+ supplements and Vit D  may need to consider treating with Aredia if upward trend continues as per Renal  5) Severe Protein Calorie Malnutrition  cont ensure with meals and MVI  nutritional recs appreciated   6) Rotator Cuff Tear  Pain control  Right arm sling  Ortho Consult appreciated  7) HTN  Hold Enalapril and HCTZ for now  8) HLD  Continue Statins  9) GERD  Protonix 40 mg  10) Hypomagnesemia  Replaced    DVT Prophylaxis -- Heparin 5000 Units    DNR/DNI.    Disposition: [facility]HI[facility] likely in 48-72 hours.

## 2019-05-28 NOTE — PROGRESS NOTE ADULT - ASSESSMENT
resolved MARY due to renal hypoperfusion==> Off IVF   cr 1.0 today  Renal function better   - avoid potential nephrotoxins  - monitor labs    R perinephric collection: s/p drainage 5/20/19  (?) GI microperforation vs abscess  cholecystostomy tube placement today by IR  - continue IV antibiotics as per ID    Will follow

## 2019-05-28 NOTE — PROGRESS NOTE ADULT - SUBJECTIVE AND OBJECTIVE BOX
HPI/OVERNIGHT EVENTS: Patient seen and examined at bedside this AM. No acute events overnight per nursing reports. Denies fever, chills, nausea, vomitting, chest pain, SOB, dizziness, abd pain or any other concerning symptoms    Vital Signs Last 24 Hrs  T(C): 37.1 (28 May 2019 15:15), Max: 37.1 (27 May 2019 23:22)  T(F): 98.8 (28 May 2019 15:15), Max: 98.8 (28 May 2019 15:15)  HR: 90 (28 May 2019 15:15) (86 - 94)  BP: 116/70 (28 May 2019 15:15) (102/62 - 116/70)  BP(mean): --  RR: 18 (28 May 2019 15:15) (18 - 18)  SpO2: 98% (28 May 2019 15:15) (94% - 98%)    I&O's Detail    27 May 2019 07:01  -  28 May 2019 07:00  --------------------------------------------------------  IN:  Total IN: 0 mL    OUT:    Bulb: 50 mL  Total OUT: 50 mL    Total NET: -50 mL          PE  General: No acute distress  Respiratory: Nonlabored  Cardiovascular: S1/S2  Abdominal: Soft, nontender, No guarding or rebound. 10f drain in place to right upper quadrant, draining greenish fluid  : Smith in place  Extremities: Warm  Vascular: Radial pulse 2+, bilaterally    LABS:    05-28    145  |  107  |  28.0<H>  ----------------------------<  103  4.0   |  29.0  |  1.03    Ca    9.9      28 May 2019 08:47  Mg     1.7     05-28            MEDICATIONS  (STANDING):  ampicillin/sulbactam  IVPB 3 Gram(s) IV Intermittent every 6 hours  atorvastatin 10 milliGRAM(s) Oral at bedtime  heparin  Injectable 5000 Unit(s) SubCutaneous every 12 hours  lactated ringers. 1000 milliLiter(s) (40 mL/Hr) IV Continuous <Continuous>  magnesium oxide 400 milliGRAM(s) Oral three times a day with meals  montelukast 10 milliGRAM(s) Oral daily  multivitamin 1 Tablet(s) Oral daily  nystatin Powder 1 Application(s) Topical two times a day  pantoprazole  Injectable 40 milliGRAM(s) IV Push daily  saccharomyces boulardii 250 milliGRAM(s) Oral two times a day  senna 2 Tablet(s) Oral at bedtime  tamsulosin 0.4 milliGRAM(s) Oral at bedtime    MEDICATIONS  (PRN):  acetaminophen   Tablet .. 650 milliGRAM(s) Oral every 6 hours PRN Temp greater or equal to 38C (100.4F), Mild Pain (1 - 3)  ondansetron Injectable 4 milliGRAM(s) IV Push every 6 hours PRN Nausea and/or Vomiting  oxyCODONE    IR 5 milliGRAM(s) Oral every 6 hours PRN Severe Pain (7 - 10)  polyethylene glycol 3350 17 Gram(s) Oral daily PRN Constipation      MICRO:   Cultures     STUDIES:   EKG, CXR, U/S, CT, MRI

## 2019-05-29 NOTE — PROGRESS NOTE ADULT - SUBJECTIVE AND OBJECTIVE BOX
Cholecystitis    HPI:  89 years old male with PMH of Colon Cancer s/p Resection, Hiatal Hernia, HTN and HLD brought by family with right upper quadrant and right shoulder pain. As per patient, pain started 5 days ago on back side of right chest and then radiated to right shoulder and right upper quadrant. She pulled her right shoulder while getting into her son's truck 5 days ago and she was attributing the pain to it.   It is associated with nausea and decrease appetite. For the last 3 days pain is mostly in right upper quadrant and it is getting worse so she was brought to ER.   Denies fever, urinary symptoms, diarrhea or vomiting. Denies shoulder injury in past. (16 May 2019 17:30)    Interval History:  Patient was seen and examined at bedside around 9:15 am. Complaining of pain in RUQ after cholecystostomy tube placement - controlled with pain medications.   Family at bedside. Right Shoulder pain has improved. Tolerating PO.   Denies chest pain, palpitations, shortness of breath, headache, dizziness, visual symptoms, nausea or vomiting.    ROS:  As per interval history otherwise unremarkable.    PHYSICAL EXAM:  Vital Signs   T(C): 37.1 (29 May 2019 07:11), Max: 37.2 (28 May 2019 23:32)  T(F): 98.7 (29 May 2019 07:11), Max: 98.9 (28 May 2019 23:32)  HR: 89 (29 May 2019 07:11) (89 - 92)  BP: 117/76 (29 May 2019 07:11) (111/58 - 117/76)  RR: 18 (29 May 2019 07:11) (18 - 18)  SpO2: 94% (29 May 2019 07:11) (94% - 98%)  General: Elderly female sitting in bed comfortably. No acute distress.  HEENT: PERRLA. EOMI. Clear conjunctivae. Moist mucus membrane. Hearing aids in place.   Neck: Supple.   Chest: CTA bilaterally - no wheezing, rales or rhonchi.   Heart: Normal S1 & S2. RRR.   Abdomen: Soft. Non-tender. Non-distended. + BS. SEAN x 2 drain on right side.   Ext: 1+ pedal edema. No calf tenderness   Neuro: Active and alert. No focal deficit. No speech disorder  Skin: Warm and Dry  Psychiatry: Normal mood and affect    MEDICATIONS  (STANDING):  ampicillin/sulbactam  IVPB 3 Gram(s) IV Intermittent every 6 hours  atorvastatin 10 milliGRAM(s) Oral at bedtime  capsaicin 0.025% Cream 1 Application(s) Topical three times a day  heparin  Injectable 5000 Unit(s) SubCutaneous every 12 hours  magnesium oxide 400 milliGRAM(s) Oral three times a day with meals  montelukast 10 milliGRAM(s) Oral daily  multivitamin 1 Tablet(s) Oral daily  nystatin Powder 1 Application(s) Topical two times a day  pantoprazole  Injectable 40 milliGRAM(s) IV Push daily  saccharomyces boulardii 250 milliGRAM(s) Oral two times a day  senna 2 Tablet(s) Oral at bedtime  tamsulosin 0.4 milliGRAM(s) Oral at bedtime    MEDICATIONS  (PRN):  acetaminophen   Tablet .. 650 milliGRAM(s) Oral every 6 hours PRN Temp greater or equal to 38C (100.4F), Mild Pain (1 - 3)  ondansetron Injectable 4 milliGRAM(s) IV Push every 6 hours PRN Nausea and/or Vomiting  oxyCODONE    IR 5 milliGRAM(s) Oral every 6 hours PRN Severe Pain (7 - 10)  polyethylene glycol 3350 17 Gram(s) Oral daily PRN Constipation    LABS:    05-28    145  |  107  |  28.0<H>  ----------------------------<  103  4.0   |  29.0  |  1.03    Ca    9.9      28 May 2019 08:47  Mg     1.7     05-28    Blood Culture: 05-28 @ 11:39  Organism --  Gram Stain Blood -- Gram Stain   No WBC's seen.  No organisms seen  Specimen Source .Body Fluid  Culture-Blood --    RADIOLOGY & ADDITIONAL STUDIES:  NM Hepatobiliary Imaging (05.26.19 @ 17:54)  Nonvisualization of gallbladder at 2 hours. Findings are compatible with acute cholecystitis. This finding is new as compared to prior study from 5/17/2019.    No evidence of bile leak.    CT Abdomen and Pelvis w/ Oral Cont (05.28.19 @ 19:24)   A cholecystostomy tube has been placed since the prior study.   There is significant reduction in size of a right perinephric fluid collection.  Otherwise stable exam since 5/22/2019. Again noted is an intrathoracic stomach.    IR CT Guided Needle Placement (05.28.19 @ 13:08)   SUCCESSFUL CT-GUIDED CHOLECYSTOSTOMY CATHETER PLACEMENT. CATHETER SHOULD REMAIN IN 4-6 WEEKS OR REMOVED WITH CHOLECYSTECTOMY.

## 2019-05-29 NOTE — PROGRESS NOTE ADULT - SUBJECTIVE AND OBJECTIVE BOX
[facility]  INFECTIOUS DISEASES AND INTERNAL MEDICINE at [place]  =======================================================  Elio James MD [facility] Center[facility] Rendon MD  Diplomates American Board of Internal Medicine and Infectious Diseases  Telephone [phone]  Fax            [phone]  =======================================================    [id number]  BETY TRINIDAD   follow up for:  fevers, right lower abd pain    no new issues.   awake and crocheting in bed - making a baby blanket    ===================================================  REVIEW OF SYSTEMS:  as above  all other ROS negative    =======================================================  Allergies  No Known Allergies    Antibiotics:  ampicillin/sulbactam  IVPB 3 Gram(s) IV Intermittent every 6 hours    ======================================================  Physical Exam:  ============  T(F): 98.7 (29 May 2019 07:11), Max: 98.9 (28 May 2019 23:32)  HR: 89 (29 May 2019 07:11)  BP: 117/76 (29 May 2019 07:11)  RR: 18 (29 May 2019 07:11)  SpO2: 94% (29 May 2019 07:11) (94% - 98%)  temp max in last 48H T(F): , Max: 98.9 (05-28-19 @ 23:32)    General:  No acute distress. FRAIL    Eye: Pupils are equal, round and reactive to light, Extraocular movements are intact, Normal conjunctiva.  HENT: Normocephalic, Oral mucosa is dry  Neck: Supple, No lymphadenopathy.  Respiratory: Lungs are clear to auscultation, Respirations are non-labored.  Cardiovascular: Normal rate, Regular rhythm,   Gastrointestinal: Soft,  + bowel sounds, + right sided drain SEAN in place with copious green fluid  right biliary drain with clear bilious fluid  Genitourinary: No costovertebral angle tenderness.  Lymphatics: No lymphadenopathy neck,   Musculoskeletal: Normal range of motion, Normal strength.  Integumentary: No rash.  Neurologic: AWAKE     =======================================================  Labs:      WBC Count: 10.2 K/uL (05-26-19 @ 07:24)  WBC Count: 11.4 K/uL (05-25-19 @ 13:28)      05-28    145  |  107  |  28.0<H>  ----------------------------<  103  4.0   |  29.0  |  1.03    Ca    9.9      28 May 2019 08:47  Mg     1.7     05-28        Culture - Body Fluid with Gram Stain (collected 05-28-19 @ 11:39)  Source: .Body Fluid  Gram Stain (05-28-19 @ 15:41):    No WBC's seen.    No organisms seen    Culture - Acid Fast - Body Fluid w/Smear (collected 05-20-19 @ 18:27)  Source: .Body Fluid    Culture - Abscess with Gram Stain (collected 05-20-19 @ 12:16)  Source: .Abscess  Gram Stain (05-20-19 @ 15:09):    Few WBC's    No organisms seen  Final Report (05-25-19 @ 08:52):    Moderate Klebsiella pneumoniae    Few Enterococcus faecalis  Organism: Klebsiella pneumoniae  Enterococcus faecalis (05-25-19 @ 08:52)  Organism: Enterococcus faecalis (05-25-19 @ 08:52)    Sensitivities:      -  Ampicillin: S <=2 Predicts results to ampicillin/sulbactam, amoxacillin-clavulanate and  piperacillin-tazobactam.      -  Tetra/Doxy: S <=4      -  Vancomycin: S 2      Method Type: PEPE  Organism: Klebsiella pneumoniae (05-25-19 @ 08:52)    Sensitivities:      -  Amikacin: S <=16      -  Ampicillin: R >16 These ampicillin results predict results for amoxicillin      -  Ampicillin/Sulbactam: S <=8/4      -  Aztreonam: S <=4      -  Cefazolin: S <=8      -  Cefepime: S <=4      -  Cefoxitin: S <=8      -  Ceftriaxone: S <=1 Enterobacter, Citrobacter, and Serratia may develop resistance during prolonged therapy      -  Ciprofloxacin: S <=1      -  Ertapenem: S <=1      -  Gentamicin: S <=4      -  Imipenem: S <=1      -  Levofloxacin: S <=2      -  Meropenem: S <=1      -  Piperacillin/Tazobactam: S <=16      -  Tobramycin: S <=4      -  Trimethoprim/Sulfamethoxazole: S <=2/38      Method Type: PEPE    Culture - Blood (collected 05-16-19 @ 20:37)  Source: .Blood  Final Report (05-21-19 @ 21:00):    No growth at 5 days.    Culture - Blood (collected 05-16-19 @ 20:37)  Source: .Blood  Final Report (05-21-19 @ 21:00):    No growth at 5 days.    Culture - Urine (collected 05-16-19 @ 17:53)  Source: .Urine  Final Report (05-17-19 @ 17:09):    <10,000 CFU/ml Corynebacterium species

## 2019-05-29 NOTE — PROGRESS NOTE ADULT - ASSESSMENT
This 89 years old male with PMH of Colon Cancer s/p Resection, Hiatal Hernia, HTN and HLD brought by family with right upper quadrant and right shoulder pain    had sonographic moy sign, but negative HIDA  right sided flank pain  CT scan with large collection,  s/p placement of IR drain   ? colonic source such as possible microperforation, then closed.   ? biliary perforation then closed    abscess cultures sent  klebsiella, also enterococcus faecalis SS to ampicillin  CT scan without extraluminal contrast  s/p placement of a percutaneous biliary drain    Biliary drain culture with Streptococcus species  - follow up on ID and susceptibility    on Unasyn; will continue    Continue to maintain drain.

## 2019-05-29 NOTE — PROGRESS NOTE ADULT - SUBJECTIVE AND OBJECTIVE BOX
INTERVAL HPI/OVERNIGHT EVENTS:    SUBJECTIVE:  Patient seen and examined at bedside this AM.  ROBERTO overnight.  Denies fever, chills, nausea, vomiting, chest pain, SOB, dizziness, abd pain or any other concerning symptoms        MEDICATIONS  (STANDING):  ampicillin/sulbactam  IVPB 3 Gram(s) IV Intermittent every 6 hours  atorvastatin 10 milliGRAM(s) Oral at bedtime  heparin  Injectable 5000 Unit(s) SubCutaneous every 12 hours  lactated ringers. 1000 milliLiter(s) (40 mL/Hr) IV Continuous <Continuous>  magnesium oxide 400 milliGRAM(s) Oral three times a day with meals  montelukast 10 milliGRAM(s) Oral daily  multivitamin 1 Tablet(s) Oral daily  nystatin Powder 1 Application(s) Topical two times a day  pantoprazole  Injectable 40 milliGRAM(s) IV Push daily  saccharomyces boulardii 250 milliGRAM(s) Oral two times a day  senna 2 Tablet(s) Oral at bedtime  tamsulosin 0.4 milliGRAM(s) Oral at bedtime    MEDICATIONS  (PRN):  acetaminophen   Tablet .. 650 milliGRAM(s) Oral every 6 hours PRN Temp greater or equal to 38C (100.4F), Mild Pain (1 - 3)  ondansetron Injectable 4 milliGRAM(s) IV Push every 6 hours PRN Nausea and/or Vomiting  oxyCODONE    IR 5 milliGRAM(s) Oral every 6 hours PRN Severe Pain (7 - 10)  polyethylene glycol 3350 17 Gram(s) Oral daily PRN Constipation      Vital Signs Last 24 Hrs  T(C): 37.1 (29 May 2019 07:11), Max: 37.2 (28 May 2019 23:32)  T(F): 98.7 (29 May 2019 07:11), Max: 98.9 (28 May 2019 23:32)  HR: 89 (29 May 2019 07:11) (89 - 92)  BP: 117/76 (29 May 2019 07:11) (111/58 - 117/76)  BP(mean): --  RR: 18 (29 May 2019 07:11) (18 - 18)  SpO2: 94% (29 May 2019 07:11) (94% - 98%)    PE  Gen: NAD  Pulm: nonlabored breathing  CV: RRR  Abd: soft, nt, nd; 10f drain in place to right upper quadrant, draining light green succous fluid  Ext: no cyanosis, clubbing, or edema  Neuro: AAOx3, no sensory or motor deficits      I&O's Detail    28 May 2019 07:01  -  29 May 2019 07:00  --------------------------------------------------------  IN:  Total IN: 0 mL    OUT:    Bulb: 50 mL  Total OUT: 50 mL    Total NET: -50 mL          LABS:    05-28    145  |  107  |  28.0<H>  ----------------------------<  103  4.0   |  29.0  |  1.03    Ca    9.9      28 May 2019 08:47  Mg     1.7     05-28            RADIOLOGY & ADDITIONAL STUDIES:    CT abd/pel: A cholecystostomy tube has been placed since the prior study.     There is significant reduction in size of a right perinephric fluid   collection.    Otherwise stable exam since 5/22/2019. Again noted is an intrathoracic   stomach.

## 2019-05-29 NOTE — PROGRESS NOTE ADULT - ASSESSMENT
89 years old male with PMH of Colon Cancer s/p Resection, Hiatal Hernia, HTN and HLD brought by family with right upper quadrant and right shoulder pain. As per patient, pain started 5 days prior on back side of right chest and then radiated to right shoulder and right upper quadrant. She pulled her right shoulder while getting into her son's truck 5 days ago and she was attributing the pain to it.  It is associated with nausea and decrease appetite. For 3 days, pain was mostly in right upper quadrant and flank area and it is getting worse so she was brought to ER.  Pt admitted. HIDA scan negative for cholecystitis, US of renal with abnormalities on the right urethral kidney area , CT with possible perinephric collection/urinoma.  Urology consulted and recommended IR drainage of fluid collection.  Patient s/p IR drainage 5/20/19 with thick green drainage. Patient work up has been inconclusive as to where is from. CT with Po contrast does not indicate intestinal leak. Per surgery continue to monitor/abs and drain. Urology doubts its a urinoma.    1) Right Perinephric Collection  s/p IR guided placement of SEAN drain   fluid cx with Klebsiella Pneumoniae and Enterococcus Faecalis   Invanz was switched to Unasyn  ID following  on probiotics while on abx  CT scan with po contrast negative for GI leak  Creatinine level of body fluid (called lab and asked to add the test)  Urology input appreciated: does not look like Urinoma  No intervention as per surgery  Discussed with Radiology - Upper GI Series won't help as patient had CT with Oral Contrast and it did not show any GI leak.   2) Acute Cholecystitis  - s/p CT guided Cholecystostomy catheter placement on 5/28/19  - Continue antibiotics  - Tolerating PO  - Pain in RUQ has improving   3) MARY  s/p iv hydration  improved  avoid nephrotoxic medications  Renal input appreciated  4) Hypercalcemia  etiology unclear, likely immobilization   avoid Ca+ supplements and Vit D  may need to consider treating with Aredia if upward trend continues as per Renal  5) Severe Protein Calorie Malnutrition  cont ensure with meals and MVI  nutritional recs appreciated   6) Rotator Cuff Tear  Pain control  Right arm sling  Ortho Consult appreciated  7) HTN  Hold Enalapril and HCTZ for now  8) HLD  Continue Statins  9) GERD  Protonix 40 mg  10) Hypomagnesemia  Replaced    DVT Prophylaxis -- Heparin 5000 Units    DNR/DNI.    Disposition: [facility] likely in 24-48 hours. 89 years old male with PMH of Colon Cancer s/p Resection, Hiatal Hernia, HTN and HLD brought by family with right upper quadrant and right shoulder pain. As per patient, pain started 5 days prior on back side of right chest and then radiated to right shoulder and right upper quadrant. She pulled her right shoulder while getting into her son's truck 5 days ago and she was attributing the pain to it.  It is associated with nausea and decrease appetite. For 3 days, pain was mostly in right upper quadrant and flank area and it is getting worse so she was brought to ER.  Pt admitted. HIDA scan negative for cholecystitis, US of renal with abnormalities on the right urethral kidney area , CT with possible perinephric collection/urinoma.  Urology consulted and recommended IR drainage of fluid collection.  Patient s/p IR drainage 5/20/19 with thick green drainage. Patient work up has been inconclusive as to where is from. CT with Po contrast does not indicate intestinal leak. Per surgery continue to monitor/abs and drain. Urology doubts its a urinoma.    1) Right Perinephric Collection  s/p IR guided placement of SEAN drain   fluid cx with Klebsiella Pneumoniae and Enterococcus Faecalis   Invanz was switched to Unasyn  ID following  on probiotics while on abx  CT scan with po contrast negative for GI leak  Creatinine level of body fluid (called lab and asked to add the test)  Urology input appreciated: does not look like Urinoma  No intervention as per surgery  Discussed with Radiology - Upper GI Series won't help as patient had CT with Oral Contrast and it did not show any GI leak.   2) Acute Cholecystitis  - s/p CT guided Cholecystostomy catheter placement on 5/28/19  - Follow cultures   - Continue antibiotics  - Tolerating PO  - Pain in RUQ has improving   3) MARY  s/p iv hydration  improved  avoid nephrotoxic medications  Renal input appreciated  4) Hypercalcemia  etiology unclear, likely immobilization   avoid Ca+ supplements and Vit D  may need to consider treating with Aredia if upward trend continues as per Renal  5) Severe Protein Calorie Malnutrition  cont ensure with meals and MVI  nutritional recs appreciated   6) Rotator Cuff Tear  Pain control  Right arm sling  Ortho Consult appreciated  7) HTN  Hold Enalapril and HCTZ for now  8) HLD  Continue Statins  9) GERD  Protonix 40 mg  10) Hypomagnesemia  Replaced    DVT Prophylaxis -- Heparin 5000 Units    DNR/DNI.    Disposition: [facility] likely in 24-48 hours.

## 2019-05-29 NOTE — PROGRESS NOTE ADULT - ASSESSMENT
89y Female presents with right retroperitonal fluid collection now s/p 8 f drain placed into GB.  Pt also s/p percutaneous cholecystostomy tube and drainage.    PLAN:  - No acute surgical intervention  - Continue IR drain  - Continue IV antibiotics  - CT seems to show no contrast into the retroperitoneal fluid.  No leakage.  Pt is stable.  - rest of care per primary team

## 2019-05-29 NOTE — PROGRESS NOTE ADULT - SUBJECTIVE AND OBJECTIVE BOX
Patient seen and examined    Feels fine  no c/o CP SOB NV   No swelling feet    Vital Signs Last 24 Hrs  T(C): 36.9 (29 May 2019 15:15), Max: 37.2 (28 May 2019 23:32)  T(F): 98.4 (29 May 2019 15:15), Max: 98.9 (28 May 2019 23:32)  HR: 86 (29 May 2019 15:15) (86 - 92)  BP: 112/68 (29 May 2019 15:15) (112/68 - 117/76)  BP(mean): --  RR: 18 (29 May 2019 15:15) (18 - 18)  SpO2: 96% (29 May 2019 15:15) (94% - 96%)    PHYSICAL EXAM    GENERAL: NAD,   EYES:  conjunctiva and sclera clear  NECK: Supple, No JVD/Bruit  NERVOUS SYSTEM:  A/O x3,   CHEST:  No rales, No rhonchi  HEART:  RRR, No murmur  ABDOMEN: Soft, NT/ND BS+  EXTREMITIES:  No Edema;  SKIN: No rashes    29 May 2019 13:30    142    |  104    |  23.0   ----------------------------<  111    3.9     |  29.0   |  1.00     Ca    9.7        29 May 2019 13:30  Mg     1.7       28 May 2019 08:47

## 2019-05-30 NOTE — PROGRESS NOTE ADULT - ASSESSMENT
89 years old male with PMH of Colon Cancer s/p Resection, Hiatal Hernia, HTN and HLD brought by family with right upper quadrant and right shoulder pain. As per patient, pain started 5 days prior on back side of right chest and then radiated to right shoulder and right upper quadrant. She pulled her right shoulder while getting into her son's truck 5 days ago and she was attributing the pain to it.  It is associated with nausea and decrease appetite. For 3 days, pain was mostly in right upper quadrant and flank area and it is getting worse so she was brought to ER.  Pt admitted. HIDA scan negative for cholecystitis, US of renal with abnormalities on the right urethral kidney area , CT with possible perinephric collection/urinoma.  Urology consulted and recommended IR drainage of fluid collection.  Patient s/p IR drainage 5/20/19 with thick green drainage. Patient work up has been inconclusive as to where is from. CT with Po contrast does not indicate intestinal leak. Per surgery continue to monitor/abs and drain. Urology doubts its a urinoma.    1) Right Perinephric Collection  s/p IR guided placement of SEAN drain   fluid cx with Klebsiella Pneumoniae and Enterococcus Faecalis   Unasyn was switched back to Invanz  ID following  on probiotics while on abx  CT scan with po contrast negative for GI leak  Creatinine level of body fluid (called lab and asked to add the test)  Urology input appreciated: does not look like Urinoma  No intervention as per surgery  Discussed with Radiology - Upper GI Series won't help as patient had CT with Oral Contrast and it did not show any GI leak.   2) Acute Cholecystitis  - s/p CT guided Cholecystostomy catheter placement on 5/28/19  - Culture is growing VRE  - Unasyn was switched back to Invanz and Zyvox was added by ID  - Tolerating PO  - Pain in RUQ has improved   3) MARY  s/p iv hydration  improved  avoid nephrotoxic medications  Renal input appreciated  4) Hypercalcemia  etiology unclear, likely immobilization   avoid Ca+ supplements and Vit D  may need to consider treating with Aredia if upward trend continues as per Renal  5) Severe Protein Calorie Malnutrition  cont ensure with meals and MVI  nutritional recs appreciated   6) Rotator Cuff Tear  Pain control  Right arm sling  Ortho Consult appreciated  7) HTN  Hold Enalapril and HCTZ for now  8) HLD  Continue Statins  9) GERD  Protonix 40 mg  10) Hypomagnesemia  Replaced    DVT Prophylaxis -- Heparin 5000 Units    DNR/DNI.    Disposition: [facility] once arrangements are made.

## 2019-05-30 NOTE — DISCHARGE NOTE NURSING/CASE MANAGEMENT/SOCIAL WORK - NSDCDPATPORTLINK_GEN_ALL_CORE
You can access the [facility] Patient Portal, offered by [facility], by registering with the following website: [email or web address]

## 2019-05-30 NOTE — DISCHARGE NOTE NURSING/CASE MANAGEMENT/SOCIAL WORK - NSDCFUADDAPPT_GEN_ALL_CORE_FT
Follow up with PMD in 1 week.  Follow up with IR in 1 week for SEAN Drain (Right Perinephric Collection).  Follow up with IR in 4-6 weeks for SEAN Drain (Acute Cholecystitis).  Monitor drain output.  Follow up with ID in 2 weeks.   Follow up with Nephrology in 1-2 weeks.   Follow up with Urology in 2 weeks.  Follow up with [facility] - Dr. Muñoz in 2 weeks.

## 2019-05-30 NOTE — PROGRESS NOTE ADULT - NSHPATTENDINGPLANDISCUSS_GEN_ALL_CORE
Dr. Rodriguez
Dr. Violetta Wilcox
ACS team, all questions answered
Dr. Rodriguez
Dr. Rodriguez
Dr. Violetta Wilcox
Dr. Violetta Wilcox
daughter at bedside, GI and surgery.
medical service.
Patient and RN
Patient and RN
Patient, Patient's Daughter, ID, NP and RN
Patient, Patient's Daughter, ID, NP and RN
Patient, Patient's Daughter, Radiology and RN
Patient, Patient's Daughter, Radiology, NP and RN
Patient. Radiology and RN
patient, ACS team, all questions answered
Patient, ACS team, all questions answered
patent, GI and NP
patient and NP at bedside

## 2019-05-30 NOTE — PROGRESS NOTE ADULT - PROVIDER SPECIALTY LIST ADULT
Gastroenterology
Hospitalist
Infectious Disease
Intervent Radiology
Nephrology
Surgery
Urology
Infectious Disease
Infectious Disease

## 2019-05-30 NOTE — PROGRESS NOTE ADULT - SUBJECTIVE AND OBJECTIVE BOX
Cholecystitis    HPI:  89 years old male with PMH of Colon Cancer s/p Resection, Hiatal Hernia, HTN and HLD brought by family with right upper quadrant and right shoulder pain. As per patient, pain started 5 days ago on back side of right chest and then radiated to right shoulder and right upper quadrant. She pulled her right shoulder while getting into her son's truck 5 days ago and she was attributing the pain to it.   It is associated with nausea and decrease appetite. For the last 3 days pain is mostly in right upper quadrant and it is getting worse so she was brought to ER.   Denies fever, urinary symptoms, diarrhea or vomiting. Denies shoulder injury in past. (16 May 2019 17:30)    Interval History:  Patient was seen and examined at bedside around 8 am. Feeling better. Pain is better controlled.   Family at bedside. Right Shoulder pain has improved. Tolerating PO. + BM.  Denies chest pain, palpitations, shortness of breath, headache, dizziness, visual symptoms, nausea or vomiting.    ROS:  As per interval history otherwise unremarkable.    PHYSICAL EXAM:  Vital Signs   T(C): 36.8 (30 May 2019 07:40), Max: 36.9 (29 May 2019 15:15)  T(F): 98.2 (30 May 2019 07:40), Max: 98.4 (29 May 2019 15:15)  HR: 89 (30 May 2019 11:49) (86 - 89)  BP: 103/67 (30 May 2019 11:49) (98/56 - 112/68)  RR: 18 (30 May 2019 07:40) (18 - 18)  SpO2: 95% (30 May 2019 08:40) (93% - 96%)  General: Elderly female sitting in bed comfortably. No acute distress.  HEENT: PERRLA. EOMI. Clear conjunctivae. Moist mucus membrane. Hearing aids in place.   Neck: Supple.   Chest: CTA bilaterally - no wheezing, rales or rhonchi.   Heart: Normal S1 & S2. RRR.   Abdomen: Soft. Non-tender. Non-distended. + BS. SEAN drain and cholecystostomy tube in place.    Ext: 1+ pedal edema. No calf tenderness   Neuro: Active and alert. No focal deficit. No speech disorder  Skin: Warm and Dry  Psychiatry: Normal mood and affect    MEDICATIONS  (STANDING):  atorvastatin 10 milliGRAM(s) Oral at bedtime  capsaicin 0.025% Cream 1 Application(s) Topical three times a day  furosemide    Tablet 20 milliGRAM(s) Oral <User Schedule>  heparin  Injectable 5000 Unit(s) SubCutaneous every 12 hours  linezolid    Tablet 600 milliGRAM(s) Oral every 12 hours  magnesium oxide 400 milliGRAM(s) Oral three times a day with meals  montelukast 10 milliGRAM(s) Oral daily  multivitamin 1 Tablet(s) Oral daily  nystatin Powder 1 Application(s) Topical two times a day  pantoprazole  Injectable 40 milliGRAM(s) IV Push daily  saccharomyces boulardii 250 milliGRAM(s) Oral two times a day  senna 2 Tablet(s) Oral at bedtime  tamsulosin 0.4 milliGRAM(s) Oral at bedtime    MEDICATIONS  (PRN):  acetaminophen   Tablet .. 650 milliGRAM(s) Oral every 6 hours PRN Temp greater or equal to 38C (100.4F), Mild Pain (1 - 3)  ondansetron Injectable 4 milliGRAM(s) IV Push every 6 hours PRN Nausea and/or Vomiting  oxyCODONE    IR 5 milliGRAM(s) Oral every 6 hours PRN Severe Pain (7 - 10)  polyethylene glycol 3350 17 Gram(s) Oral daily PRN Constipation    LABS:    05-29    142  |  104  |  23.0<H>  ----------------------------<  111  3.9   |  29.0  |  1.00    Ca    9.7      29 May 2019 13:30    Blood Culture: 05-28 @ 11:39  Organism Enterococcus faecium (vancomycin resistant)  Gram Stain Blood -- Gram Stain   No WBC's seen.  No organisms seen  Specimen Source .Body Fluid  Culture-Blood --    RADIOLOGY & ADDITIONAL STUDIES:  NM Hepatobiliary Imaging (05.26.19 @ 17:54)  Nonvisualization of gallbladder at 2 hours. Findings are compatible with acute cholecystitis. This finding is new as compared to prior study from 5/17/2019.    No evidence of bile leak.    CT Abdomen and Pelvis w/ Oral Cont (05.28.19 @ 19:24)   A cholecystostomy tube has been placed since the prior study.   There is significant reduction in size of a right perinephric fluid collection.  Otherwise stable exam since 5/22/2019. Again noted is an intrathoracic stomach.    IR CT Guided Needle Placement (05.28.19 @ 13:08)   SUCCESSFUL CT-GUIDED CHOLECYSTOSTOMY CATHETER PLACEMENT. CATHETER SHOULD REMAIN IN 4-6 WEEKS OR REMOVED WITH CHOLECYSTECTOMY.

## 2019-05-30 NOTE — PROGRESS NOTE ADULT - REASON FOR ADMISSION
RUQ and Right Shoulder Pain

## 2019-05-30 NOTE — PROGRESS NOTE ADULT - SUBJECTIVE AND OBJECTIVE BOX
[facility]  INFECTIOUS DISEASES AND INTERNAL MEDICINE at [place]  =======================================================  Elio Rendon MD  Diplomates American Board of Internal Medicine and Infectious Diseases  Telephone [phone]  Fax            [phone]  =======================================================    [id number]  BETY TRINIDAD   follow up for:  fevers, right lower abd pain    no new issues.     prelim VRE in the fluid culture    ===================================================  REVIEW OF SYSTEMS:  as above  all other ROS negative    =======================================================  Allergies  No Known Allergies    Antibiotics:  ertapenem  IVPB 500 milliGRAM(s) IV Intermittent every 24 hours  linezolid    Tablet 600 milliGRAM(s) Oral every 12 hours    ======================================================  Physical Exam:  ============  T(F): 98.2 (30 May 2019 07:40), Max: 98.4 (29 May 2019 15:15)  HR: 88 (30 May 2019 08:40)  BP: 102/61 (30 May 2019 08:40)  RR: 18 (30 May 2019 07:40)  SpO2: 95% (30 May 2019 08:40) (93% - 96%)  temp max in last 48H T(F): , Max: 98.9 (05-28-19 @ 23:32)    General:  No acute distress. FRAIL    Eye: Pupils are equal, round and reactive to light, Extraocular movements are intact, Normal conjunctiva.  HENT: Normocephalic, Oral mucosa is dry  Neck: Supple, No lymphadenopathy.  Respiratory: Lungs are clear to auscultation, Respirations are non-labored.  Cardiovascular: Normal rate, Regular rhythm,   Gastrointestinal: Soft,  + bowel sounds, + right sided drain SEAN in place - color is less green, murky cream color  right biliary drain with clear bilious fluid  Genitourinary: No costovertebral angle tenderness.  Lymphatics: No lymphadenopathy neck,   Musculoskeletal: Normal range of motion, Normal strength.  Integumentary: No rash.  Neurologic: AWAKE     =======================================================  Labs:      WBC Count: 10.2 K/uL (05-26-19 @ 07:24)  WBC Count: 11.4 K/uL (05-25-19 @ 13:28)      05-29    142  |  104  |  23.0<H>  ----------------------------<  111  3.9   |  29.0  |  1.00    Ca    9.7      29 May 2019 13:30        Culture - Body Fluid with Gram Stain (05.28.19 @ 11:39)    Gram Stain:   No WBC's seen.  No organisms seen    Specimen Source: .Body Fluid    Culture Results:   Numerous Streptococcus species Identification and susceptibility to  follow.  Culture in progress      Culture - Acid Fast - Body Fluid w/Smear (collected 05-20-19 @ 18:27)  Source: .Body Fluid    Culture - Abscess with Gram Stain (collected 05-20-19 @ 12:16)  Source: .Abscess  Gram Stain (05-20-19 @ 15:09):    Few WBC's    No organisms seen  Final Report (05-25-19 @ 08:52):    Moderate Klebsiella pneumoniae    Few Enterococcus faecalis  Organism: Klebsiella pneumoniae  Enterococcus faecalis (05-25-19 @ 08:52)  Organism: Enterococcus faecalis (05-25-19 @ 08:52)    Sensitivities:      -  Ampicillin: S <=2 Predicts results to ampicillin/sulbactam, amoxacillin-clavulanate and  piperacillin-tazobactam.      -  Tetra/Doxy: S <=4      -  Vancomycin: S 2      Method Type: PEPE  Organism: Klebsiella pneumoniae (05-25-19 @ 08:52)    Sensitivities:      -  Amikacin: S <=16      -  Ampicillin: R >16 These ampicillin results predict results for amoxicillin      -  Ampicillin/Sulbactam: S <=8/4      -  Aztreonam: S <=4      -  Cefazolin: S <=8      -  Cefepime: S <=4      -  Cefoxitin: S <=8      -  Ceftriaxone: S <=1 Enterobacter, Citrobacter, and Serratia may develop resistance during prolonged therapy      -  Ciprofloxacin: S <=1      -  Ertapenem: S <=1      -  Gentamicin: S <=4      -  Imipenem: S <=1      -  Levofloxacin: S <=2      -  Meropenem: S <=1      -  Piperacillin/Tazobactam: S <=16      -  Tobramycin: S <=4      -  Trimethoprim/Sulfamethoxazole: S <=2/38      Method Type: PEPE    Culture - Blood (collected 05-16-19 @ 20:37)  Source: .Blood  Final Report (05-21-19 @ 21:00):    No growth at 5 days.    Culture - Blood (collected 05-16-19 @ 20:37)  Source: .Blood  Final Report (05-21-19 @ 21:00):    No growth at 5 days.    Culture - Urine (collected 05-16-19 @ 17:53)  Source: .Urine  Final Report (05-17-19 @ 17:09):    <10,000 CFU/ml Corynebacterium species

## 2019-06-11 PROBLEM — I10 ESSENTIAL (PRIMARY) HYPERTENSION: Chronic | Status: ACTIVE | Noted: 2019-01-01

## 2019-06-11 PROBLEM — E78.5 HYPERLIPIDEMIA, UNSPECIFIED: Chronic | Status: ACTIVE | Noted: 2019-01-01

## 2019-06-11 PROBLEM — C18.9 MALIGNANT NEOPLASM OF COLON, UNSPECIFIED: Chronic | Status: ACTIVE | Noted: 2019-01-01

## 2019-06-11 PROBLEM — K44.9 DIAPHRAGMATIC HERNIA WITHOUT OBSTRUCTION OR GANGRENE: Chronic | Status: ACTIVE | Noted: 2019-01-01

## 2019-06-18 NOTE — REASON FOR VISIT
[Acute] : an acute visit [Family Member] : family member [FreeTextEntry1] : s/p drainage of abscess and cholecystostomy tube.

## 2019-06-18 NOTE — PHYSICAL EXAM
[JVD] : no jugular venous distention  [Normal Heart Sounds] : normal heart sounds [Normal Breath Sounds] : Normal breath sounds [de-identified] : non tender, biliary drain clear bile- un known output.\par right flank drain serous output, minimal- removed [de-identified] : NAD

## 2019-06-26 NOTE — ASSESSMENT
[FreeTextEntry1] : Will advise ct scan and f/u with IR for possible drain removal as drain output is low

## 2019-06-26 NOTE — REVIEW OF SYSTEMS
[Negative] : Heme/Lymph [Chills] : chills [Feeling Tired] : feeling tired [Joint Pain] : joint pain [Limb Swelling] : limb swelling [Limb Weakness] : limb weakness [Joint Swelling] : joint swelling [Muscle Weakness] : muscle weakness [Difficulty Walking] : difficulty walking [Feelings Of Weakness] : no feelings of weakness

## 2019-06-26 NOTE — HISTORY OF PRESENT ILLNESS
[FreeTextEntry1] : 89 yo female with retroperitoneal collection, s/p IR drainage, poss walled off\par bowel perforation, does not look like urinoma\par  [Abdominal Pain] : abdominal pain [None] : None

## 2019-06-26 NOTE — PHYSICAL EXAM
[Abdomen Soft] : soft [Abdomen Tenderness] : non-tender [Oriented To Time, Place, And Person] : oriented to person, place, and time

## 2019-07-11 NOTE — ASU PATIENT PROFILE, ADULT - LEARNING ASSESSMENT (PATIENT) ADDITIONAL COMMENTS
Pre op instructions given to nursing Facility Telephone interview, spoke with Caitlyn Castro at [place] nursing/rehab pre-admission instructions/teaching, tips for safer surgery, faxed to [facility] nursing/rehab. Telephone interview, spoke with Caitlyn Castro at [place] nursing/rehab pre-admission instructions/teaching, tips for safer surgery, last dose of ASA documented on 7/6/19 & lasix to be held for 2 days as per PCP, faxed to [facility] nursing/rehab.

## 2019-07-11 NOTE — ASU PATIENT PROFILE, ADULT - PRO ARRIVE FROM
long term care facility [facility]/long term care [facility]

## 2019-07-11 NOTE — ASU PATIENT PROFILE, ADULT - NPO AFTER
-pt currently not in respiratory distress, no wheezing on exam  -continue home meds of Singulair, Ventolin, Flovent 23:00

## 2019-07-12 NOTE — CHART NOTE - NSCHARTNOTEFT_GEN_A_CORE
Vascular & Interventional Radiology Pre-Procedure Note    Procedure Name: ____CT guided right perinephric collection drainage___    HPI: 89y Female with __right perinephric collection_____    Allergies:   Medications (Abx/Cardiac/Anticoagulation/Blood Products)      Data:  152.4  68  T(C): 36.6  HR: 92  BP: 108/59  RR: 16  SpO2: 99%    Exam  General: ___wnl____  Chest: ____wnl___  Abdomen: ___wnl____  Extremities: ___wnl____          Imaging: ___left posterior paranephric collection____    Plan:   -89y Female presents for ___CT guided left perinephric collection drainage____  -Risks/Benefits/alternatives explained with the patient and/or healthcare proxy and witnessed informed consent obtained.

## 2019-07-12 NOTE — ASU DISCHARGE PLAN (ADULT/PEDIATRIC) - CALL YOUR DOCTOR IF YOU HAVE ANY OF THE FOLLOWING:
Increased irritability or sluggishness/Fever greater than (need to indicate Fahrenheit or Celsius)/Wound/Surgical Site with redness, or foul smelling discharge or pus/Unable to urinate/Pain not relieved by Medications/Nausea and vomiting that does not stop/Bleeding that does not stop/Swelling that gets worse/Inability to tolerate liquids or foods

## 2019-07-15 PROBLEM — K80.13 CALCULUS OF GALLBLADDER WITH ACUTE ON CHRONIC CHOLECYSTITIS WITH OBSTRUCTION: Status: ACTIVE | Noted: 2019-01-01

## 2019-07-15 NOTE — HISTORY OF PRESENT ILLNESS
[FreeTextEntry1] : 89YOF with complicated hospital course treating retroperitoneal abscess s/p IR drainage and percuatneous cholecystestomy tube for actue cholecystitis. Patient had IR drain removed and had recent repeat CT done which showed re-accumulation of fluid and is being sent to IR for drainage again. patient presents today to evaluate cholecystostomy tube.\par patient does not eat much but tolerating diet without difficulty. no fevers or chills. no abdominl pain. normal bowel function. cholecystostomy tube is in place with clear bile drainage. \par

## 2019-07-15 NOTE — ASSESSMENT
[FreeTextEntry1] : Patient seen by our group but paperwork seemed to indicate that her doctor wanted patient to be evaluated by Dr. Beltran.  Regardless, discussed at length that at this current time, if she is stable and is being sent for another IR procedure, would wait to address the cholecystostomy tube.  Retroperitoneal fluid collection is of unknown etiology.  \par Discussed that once IR procedure done and patient stable, usual course of action would be cholecystectomy vs. ERCP with stent (recent CT scan shows CBD stones). Understably, patient and family are concerned of patient undergoing surgery or more procedures at her age and being quite frail as she is largely nonambulatory.  Recommended that we have on going discussion of ultimate goals of care and that living with the cholecystostomy tube is an option.\par Advised patient to follow up with urology since they have ordered repeat IR drainage. Explained to patient an dfamily that we will be happy to see her again but her personal doctor may have actually wanted her to be seen by Dr. Beltran as the paperwork seemed to indicate the referral was for him. Will allow patient and family to decide who they would like to follow up with but currently patient is asymptomatic and advised patient and family to proceed with urology plans for redraiange but would hold on further studies or management of cholecystostomy tube until resolutation of the fluid collection.\par

## 2019-07-15 NOTE — PHYSICAL EXAM
[de-identified] : NAD, AAOx3, frail appearing [de-identified] : soft, ND, NT. cholecystostomy tube in place with clear bile.

## 2019-07-22 NOTE — ED PROVIDER NOTE - CLINICAL SUMMARY MEDICAL DECISION MAKING FREE TEXT BOX
Pt with recurrent pain out of rectum, unclear cause, possible secondary to superficial rotation, will eval for intraabdominal pathology if neg will treat pain, pt to follow up with Doctor at Nursing Home for outpatient management.

## 2019-07-22 NOTE — ED PROVIDER NOTE - SKIN, MLM
Skin normal color for race, warm, dry and intact. No evidence of rash except for erythema and tenderness along the rectum

## 2019-07-22 NOTE — ED PROVIDER NOTE - PROGRESS NOTE DETAILS
Patient has remained stab le. Awaiting CT. Delay in obtaining CT. It was sent ot C. Patient has difficulty tolerating suppository; discussed digital disimpaction with daughter. Will not do so given level of tenderness.

## 2019-07-22 NOTE — ED PROVIDER NOTE - MUSCULOSKELETAL, MLM
Spine appears normal, range of motion is not limited, no muscle or joint tenderness.  no CVA tenderness, back- another drain putting out scant yellowish liquid

## 2019-07-22 NOTE — ED ADULT NURSE NOTE - CHPI ED NUR SYMPTOMS NEG
no chills/no decreased eating/drinking/no fever/no nausea/no tingling/no weakness/no vomiting/no dizziness

## 2019-07-22 NOTE — ED ADULT NURSE NOTE - OBJECTIVE STATEMENT
Pt A&OX3, biba from Rehab facility, c/o rectal burning.  Pt has a very irritated anal area.  Pt states she has been having loose stools X 3 days.  Pt denies any abd pain, N/V.  Pt has Pt A&OX3, biba from Rehab facility, c/o rectal burning.  Pt has a very irritated anal area.  Pt states she has been having loose stools X 3 days.  Pt denies any abd pain, N/V.  Pt has a SEAN drainage to right flank area and has a drain near umbilical area, draining bile from gall bladder.

## 2019-07-22 NOTE — ED PROVIDER NOTE - GASTROINTESTINAL, MLM
Abdomen soft, non-tender, no guarding. drain in the LUQ, which is biliary drain functioning normal, dark bile in reservoir

## 2019-07-22 NOTE — ED PROVIDER NOTE - CHPI ED SYMPTOMS NEG
no chills/no fever/HA, LOC, confusion, dizziness, focal neuro deficits, CP/SOB/palpitations, cough, n/v/d, back pain, neck pain, rash, urinary f/u/d, weakness/dizziness,

## 2019-07-22 NOTE — ED PROVIDER NOTE - OBJECTIVE STATEMENT
89 y/o F pt with significant PMHx of presents to the ED c/o recurrent rectal pain since last week. Pt reports she had a drain placed into her Kidneys. She notes since she has been experiencing "spasms to her bottom" infrequent but more prevalent. Describes the pain as burning sensation. The pain radiates into her abdomen. She notes that when something is coming out of her rectum it is when hurts most  when urinate pass a little bit of gas, and that causes pain  nursing home. did notice mild redness around rectum, treated accordingly, that  has not helped her symptoms  Denies fevers, chills, HA, LOC, confusion, dizziness, focal neuro deficits, CP/SOB/palpitations, cough, n/v/d, back pain, neck pain, rash, urinary f/u/d, weakness/dizziness, recent trauma, recent travel and sick contacts. No other complaints at this time. 89 y/o F pt with significant PMHx of presents to the ED c/o infrequent rectal pain since last week. The pain has become more prevalent. Pt reports she had a drain placed into her Kidneys. She notes she has been experiencing "spasms to her bottom" since the drain placement.  Describes the pain as burning sensation. The pain radiates into her abdomen. She notes that when something is coming out of her rectum it hurts the worse. Pt reports when she urinates she passes a little bit of gas which causes pain. She lives in a Nursing home. Pt notes they found mild redness around her rectum. Pt has been treated accordingly which has provided no relief to her symptoms. Denies fevers, chills, HA, LOC, confusion, dizziness, focal neuro deficits, CP/SOB/palpitations, cough, n/v/d, back pain, neck pain, rash, urinary f/u/d, weakness/dizziness, recent trauma, recent travel and sick contacts. No other complaints at this time.

## 2019-07-23 NOTE — ED ADULT NURSE REASSESSMENT NOTE - NS ED NURSE REASSESS COMMENT FT1
assumed care of pt at this time. pt resting comfortably in bed. NAD noted, VS stable. will continue to monitor.

## 2019-07-25 NOTE — HISTORY OF PRESENT ILLNESS
[Abdominal Pain] : abdominal pain [Fatigue] : fatigue [FreeTextEntry1] : Had f/u ct and subsequent replacement of drain by IR.  Feels ok. afebrile\par \par Successful CT-guided drainage of a right perinephric abscess \par \par

## 2019-07-25 NOTE — PHYSICAL EXAM
[Abdomen Soft] : soft [Abdomen Tenderness] : non-tender [Costovertebral Angle Tenderness] : no ~M costovertebral angle tenderness [FreeTextEntry1] : drain site clean and dry [Oriented To Time, Place, And Person] : oriented to person, place, and time

## 2019-07-26 NOTE — HISTORY OF PRESENT ILLNESS
[FreeTextEntry1] : Pt has a right pigtail catheter that is still draining. Recent CT abd 2 days ago showed 4cm loculated collection smaller compared to previously 7cm in size. Pt is fatigued according to daughter. No fever/chills.

## 2019-07-26 NOTE — ASSESSMENT
[FreeTextEntry1] : Will contact IR to discuss when to take off pigtail. Currently c/w the catheter as it's still draining. Previous fluid collection culture was negative. Pt is getting treated for UTI at nursing home.

## 2019-07-26 NOTE — REVIEW OF SYSTEMS
[Negative] : Heme/Lymph [Fever] : no fever [Chills] : no chills [Feeling Poorly] : feeling poorly [Feeling Tired] : feeling tired [Vomiting] : no vomiting [Constipation] : no constipation [Diarrhea] : no diarrhea [Dysuria] : dysuria [Pelvic Pain] : no pelvic pain

## 2019-08-13 PROBLEM — K65.1 ABSCESS OF ABDOMINAL CAVITY: Status: ACTIVE | Noted: 2019-01-01

## 2019-08-13 NOTE — HISTORY OF PRESENT ILLNESS
[FreeTextEntry1] : 89F with prolonged hospital course for right retroperitoneal fluid collection of unclear etiology and had cholecystostomy tube placed due to concerns that the gallbladder was source of fluid collection. Ultimately the retroperitoneal drain was removed and she followed up with urology where a repeat scan showed reaccumulation of the collection and had another pigtail placed into the collection. serous minimal output from that drain. she has not followed up with urology.\par cholecysostomy tube remains in place. eating. normla bowel function. no abdominal pain. here to have all drains removed.\par no fevers or chills. no nausea or vomiting.\par

## 2019-08-13 NOTE — ASSESSMENT
[FreeTextEntry1] : patient remains asymptomatic with biliary drain in place. SEAN drain with minimal serous output.\par \par 1. patient advised to follow up with urology regarding management of retroperitoneal drain\par 2. will send patient for cholecystostomy tube study and repeat RUQ US - previous CT scan showed CBD stones. \par 3. patient to return after studies performed. May need ERCP +/- cholecystectomy.\par \par

## 2019-08-13 NOTE — PHYSICAL EXAM
[de-identified] : NAD, frail appearing [de-identified] : soft, ND, NT, biliary drain with clear bile. retroperitoneal drain with clear serous output.\par

## 2019-09-03 NOTE — ED PROVIDER NOTE - CHPI ED SYMPTOMS NEG
no vomiting/no fever/no headache/no loss of consciousness/no nausea/CP, SOB, palp. cough, abd pain, diarrhea, urinary f/u/d

## 2019-09-03 NOTE — ED PROVIDER NOTE - CONSTITUTIONAL, MLM
normal... appears dry, well nourished, awake, alert, oriented to person, place, time/situation and in no acute distress.

## 2019-09-03 NOTE — ED PROVIDER NOTE - OBJECTIVE STATEMENT
89 y/o F with daughter with PMHx of Colon cancer, Hiatal hernia, HLD, and HTN presents to the ED s/p abnormal lab results performed today. Daughter at bedside notes pt has had increased confusion, lethargy, and chills for the past 3 days. Pt was seen at [facility] on 7/12 and IR placed a drainage at that time due to a perinephric abscess. As per daughter the drain has not been working. Denies fevers, HA, LOC, CP/SOB/palpitations, cough, abd pain, n/v/d, urinary f/u/d, recent trauma, recent travel and sick contacts. PSHx of Hemicolectomy. No other complaints at this time.

## 2019-09-03 NOTE — ED PROVIDER NOTE - CARE PLAN
Principal Discharge DX:	Renal failure  Secondary Diagnosis:	Hyperkalemia  Secondary Diagnosis:	Acidemia

## 2019-09-03 NOTE — ED PROVIDER NOTE - PHYSICAL EXAMINATION
no acute distress.  appears dry.   heart 2/6 murmur  right sided flank drain with small amount of muddy fluid. t-tube cholangiogram

## 2019-09-03 NOTE — ED ADULT NURSE NOTE - CHIEF COMPLAINT QUOTE
Pt sent from [facility] for increased confusion and lethargy over the past 3 days and abnormal lab results. Creatinine 7.2, bun 122. PT with nephrostomy tube and gallbladder drain per daughter for increased fluid around kidneys. FS 94. Pt is alert and oriented x2, confused to date.

## 2019-09-03 NOTE — ED PROVIDER NOTE - PROGRESS NOTE DETAILS
Labs as noted.  Case d/w Ayesha/Dr. Campo and will start on bicarb drip and admit to monitored bed.  case d/w Hospitalist/Dr. Balbuena and will admit

## 2019-09-03 NOTE — ED ADULT TRIAGE NOTE - CHIEF COMPLAINT QUOTE
Pt sent from [facility] for increased confusion over the past 3 days and abnormal lab results. Creatinine 7.2, bun 122. PT with nephrostomy tube and gallbladder drain per daughter for increased fluid around kidneys. FS 94. Pt is alert and oriented x2, confused to date. Pt sent from [facility] for increased confusion and lethargy over the past 3 days and abnormal lab results. Creatinine 7.2, bun 122. PT with nephrostomy tube and gallbladder drain per daughter for increased fluid around kidneys. FS 94. Pt is alert and oriented x2, confused to date.

## 2019-09-04 NOTE — GOALS OF CARE CONVERSATION - PERSONAL ADVANCE DIRECTIVE - TREATMENT GUIDELINE COMMENT
- transition to CMO, no labs, studies, imaging  - Hospice referral placed  - optimize pain control  - PO intake as tolerated

## 2019-09-04 NOTE — CONSULT NOTE ADULT - ASSESSMENT
89 year old female with colon cancer s/p resection, HTN, HLD, recent hospitalization for perinephritic abscess s/p nephrotomy tube and acute cholecystitis s/p cholecystotomy tube (declined surgery) and resident from [facility] sent for abnormal lab work found to have severe MARY, uremia and anion gap metabolic acidosis. 89 year old female with colon cancer s/p resection, HTN, HLD, recent hospitalization for perinephritic abscess s/p nephrotomy tube and acute cholecystitis s/p cholecystotomy tube (declined surgery in 5/2019) and resident from [facility] sent for abnormal lab work found to have severe MARY, uremia and anion gap metabolic acidosis. GOC initiated by primary team on admission, dtr verbalized desire for conservative medical mgnt, no invasive or surgical intervention, or dialysis. Palliative consulted for ongoing goc.     PLAN    MARY/Uremia/AG Metabolic Acidosis 89 year old female with colon cancer s/p resection, HTN, HLD, recent hospitalization for perinephritic abscess s/p nephrotomy tube and acute cholecystitis s/p cholecystotomy tube (declined surgery in 5/2019) and resident from [facility] sent for abnormal lab work found to have severe MARY, uremia and anion gap metabolic acidosis. GOC initiated by primary team on admission, dtr verbalized desire for conservative medical mgnt, no invasive or surgical intervention, or dialysis. Palliative consulted for ongoing goc.     PLAN    MARY/Uremia/AG Metabolic Acidosis  - minimal improvement with bicarb   - seen by nephrology and input appreciated, not a candidate for dialysis   - family opting for comfort measure    Rt Flank Pain  - near site of cholecystotomy drain since placement per family, was treated with NSAIDs outpatient  - Pt is comfortable after getting IV morphine 1 mg on admission  - will add IV APAP 1g PRN and IV dilaudid 0.5 mg PRN (given renal status)    Palliative Care Encounter  Please refer to GOC. Long discussion held with dtr Caren and son in law. Family opting for transition to comfort measures, no further blood draws, labs, studies, no HD and amendable to hospice eval. Prognosis very poor, days to less likely weeks. Pt would likely benefit from inpatient hospice unit given lethargy and further optimization of sx/pain control.   - MOLST in chart DNR/I confirmed with dtr

## 2019-09-04 NOTE — H&P ADULT - PROBLEM SELECTOR PLAN 4
CT does not show any evidence of ongoing cholecystitis, LFTs normal, no evidence of infection, no wbc, fever, shift. Removal of drain by IR??

## 2019-09-04 NOTE — ED ADULT NURSE REASSESSMENT NOTE - NS ED NURSE REASSESS COMMENT FT1
Pt. transported to [facility], report given to [facility] JHONNY Pollard. Pt. safety and comfort measures maintained. Pt. placed on tele monitor with . Handoff and transfer of care occurred @ 0004.
Daughter remains at bedside. Pt. in NSR on cardiac monitor, BP remains stable. Awaiting 1L NS bolus to complete before initiating ordered bicarb gtt as per Dr. Steven. Pt. medicated for pain with 1mg morphine IVP, pt now appears more comfortable and is resting in stretcher. Pt. ADMITTED at this time.

## 2019-09-04 NOTE — CONSULT NOTE ADULT - SUBJECTIVE AND OBJECTIVE BOX
PALLIATIVE CONSULT    CC: Patient is a 89y old  Female who presents with a chief complaint of Confusion, Dehydration, Uremia (04 Sep 2019 08:42)    HPI:  Mrs. De La Cruz is a 89 year old female resident of [facility] with PMHx of Colon Cancer s/p resection, HTN, HLD sent from NH due to critical abnormal outpatient blood work showing acute renal failure. Pt hospitalized in 2019 perinephric abscess s/p right nephrostomy tube and acute cholecystitis s/p cholecystostomy tube as family declined surgical intervention due to high risk nature at that time. She continues to drain from nephrostomy tube and with intermittent pain treated with NSAIDs. In ER, lab work revealed severe MARY, uremia (132/8) and anion gap metabolic acidosis. Nephrology consulted and recommended IVF D5 with Bicarb. GOC initiated by primary and nephrology team. Dtr Caren desires conservative medical management, would NOT want any invasive procedures, surgeries or hemodialysis. A MOLST completed back on 19 for DNR/I, confirmed by dtr. Palliative consulted for support and ongoing goc.     **FULL CONSULT TO FOLLOW      Present Symptoms:   Dyspnea: Yes No   Nausea/Vomiting: Yes No  Anxiety:  Yes No  Depression: Yes No  Fatigue: Yes No  Loss of appetite: Yes No  Pain:           Unable to perform ROS due to acute encephalopathy.    PERTINENT PMH REVIEWED: Yes     PAST MEDICAL & SURGICAL HISTORY:  History of cholecystitis  Perinephric abscess  HLD (hyperlipidemia)  Hiatal hernia  Colon cancer  HTN (hypertension)  H/O hemicolectomy      SOCIAL HISTORY:    Admitted from:  [facility]      Baseline ADLs (prior to admission):  mostly dependent   Karnofsky: 10 %    Surrogate/HCP/Guardian:   - surrogate is daughter Caren Hernandez [phone]    ADVANCE DIRECTIVES:   DNR YES NO  Completed on:                     MOLST  YES NO   Completed on: 19 copy in chart from NH  Living Will  YES NO   Completed on:    FAMILY HISTORY:  No pertinent family history in first degree relatives  Unable to obtain history due to encephalopathy.     Allergies    No Known Allergies    Intolerances        MEDICATIONS  (STANDING):  chlorhexidine 4% Liquid 1 Application(s) Topical <User Schedule>  dextrose 5% 1000 milliLiter(s) (125 mL/Hr) IV Continuous <Continuous>  influenza   Vaccine 0.5 milliLiter(s) IntraMuscular once  sodium chloride 0.9% lock flush 3 milliLiter(s) IV Push every 8 hours    MEDICATIONS  (PRN):  ondansetron Injectable 4 milliGRAM(s) IV Push every 6 hours PRN Nausea      PHYSICAL EXAM:    Vital Signs Last 24 Hrs  T(C): 36.4 (04 Sep 2019 08:30), Max: 36.5 (04 Sep 2019 04:29)  T(F): 97.6 (04 Sep 2019 08:30), Max: 97.7 (04 Sep 2019 04:29)  HR: 97 (04 Sep 2019 08:30) (78 - 99)  BP: 104/70 (04 Sep 2019 08:30) (78/51 - 110/59)  BP(mean): 66 (04 Sep 2019 02:20) (66 - 66)  RR: 20 (04 Sep 2019 08:30) (16 - 22)  SpO2: 90% (04 Sep 2019 08:30) (90% - 100%)    General: elderly. Resting comfortably. No acute distress.   HEENT: MMM, mucous membrane dry.  ETT/trach or NGT  Lungs: clear to auscultation bilaterally. no rales, rhonchi, wheezing. non-labored.   CV: +s1/s2. Regular rate and rhythm.  No murmurs, rubs, gallop  GI:+ bowel sound. abdomen soft, non-tender, non-distended.  MSK: Moves all 4 extremities.  No cyanosis or edema. weakness.   Neuro: Awake and alert, oriented to person/place/time. Interactive.   : suprapubic tenderness to palpation. Smith  Skin: warm and dry. no rash. ecchymosis. wounds.      LABS:                        10.7   9.58  )-----------( 368      ( 03 Sep 2019 20:24 )             32.7     09-04    138  |  109<H>  |  124.0<H>  ----------------------------<  126<H>  4.3   |  8.0<LL>  |  7.63<H>    Ca    8.5<L>      04 Sep 2019 06:25  Phos  9.3     09-04  Mg     1.6     09-04    TPro  6.9  /  Alb  3.1<L>  /  TBili  0.3<L>  /  DBili  x   /  AST  21  /  ALT  12  /  AlkPhos  85  09-    PT/INR - ( 03 Sep 2019 20:24 )   PT: 12.9 sec;   INR: 1.12 ratio         PTT - ( 03 Sep 2019 20:24 )  PTT:33.4 sec  Urinalysis Basic - ( 04 Sep 2019 05:14 )    Color: Yellow / Appearance: very cloudy / S.020 / pH: x  Gluc: x / Ketone: Negative  / Bili: Negative / Urobili: Negative mg/dL   Blood: x / Protein: 30 mg/dL / Nitrite: Negative   Leuk Esterase: Moderate / RBC: 6-10 /HPF / WBC >50   Sq Epi: x / Non Sq Epi: Few / Bacteria: Few        RADIOLOGY & ADDITIONAL STUDIES:     CT A/P 9/3/19  FINDINGS:    LOWER CHEST: No change of large hiatal hernia containing the entire   stomach, and small and large bowel loops.  LIVER: Within normal limits.  BILE DUCTS: Known multiple common bile duct stones are again noted and   less prominent on the current exam.  GALLBLADDER: Percutaneous cholecystostomy tube in place.  SPLEEN: Within normal limits.  PANCREAS: Within normal limits.  ADRENALS: Within normal limits.  KIDNEYS/URETERS: Atrophic kidneys with cortical scarring, more in the right.  BLADDER: Within normal limits.  REPRODUCTIVE ORGANS: Fibroid uterus.  BOWEL: No bowel obstruction. Surgical anastomosis in the right lower   quadrant due to known right hemicolectomy.Colonic diverticulosis without   diverticulitis. No thickening with presacral stranding which may indicate stercoral colitis.  PERITONEUM: No ascites.  VESSELS: Extensive vascular calcification.  RETROPERITONEUM/LYMPH NODES: No lymphadenopathy. Drainage catheter in the   right posterior pararenal space. Further decrease of collection with   minimal residual fluid.    ABDOMINAL WALL: Within normal limits.  BONES: Advanced degenerative spondylosis with facet joint arthrosis..    IMPRESSION:   Further decrease of retroperitoneal collection.  Findings concerning for stercoral colitis.  Otherwise no change.      Thank you for the opportunity to assist with the care of this patient.   [place] [facility] Consult Service [phone]. PALLIATIVE CONSULT    CC: Patient is a 89y old  Female who presents with a chief complaint of Confusion, Dehydration, Uremia (04 Sep 2019 08:42)    HPI:  Mrs. De La Cruz is a 89 year old female resident of [facility] with PMHx of Colon Cancer s/p resection, HTN, HLD sent from NH due to critical abnormal outpatient blood work showing acute renal failure. Pt hospitalized in 2019 perinephric abscess s/p right nephrostomy tube and acute cholecystitis s/p cholecystostomy tube as family declined surgical intervention due to high risk nature at that time. She continues to drain from nephrostomy tube and with intermittent pain treated with NSAIDs. In ER, lab work revealed severe MARY, uremia (132/8) and anion gap metabolic acidosis. Nephrology consulted and recommended IVF D5 with Bicarb. GOC initiated by primary and nephrology team. Dtr Caren desires conservative medical management, would NOT want any invasive procedures, surgeries or hemodialysis. A MOLST completed back on 19 for DNR/I, confirmed by dtr. Palliative consulted for support and ongoing goc.     Pt is a poor historian, info from family, chart review and medical staff. Pt lethargic, resting comfortably, opens eye to verbal stimuli, minimally engaged in conversation.     Unable to perform ROS due to lethargy.    PERTINENT PMH REVIEWED: Yes     PAST MEDICAL & SURGICAL HISTORY:  History of cholecystitis  Perinephric abscess  HLD (hyperlipidemia)  Hiatal hernia  Colon cancer  HTN (hypertension)  H/O hemicolectomy      SOCIAL HISTORY:    Admitted from:  [facility]      Baseline ADLs (prior to admission):  mostly dependent, bedbound at baseline  Karnofsky: 20 %    Surrogate/HCP/Guardian:   - surrogate is daughter Caren Hernandez [phone]    ADVANCE DIRECTIVES:   DNR YES NO  Completed on:                     MOLST  YES NO   Completed on: 19 copy in chart from NH  Living Will  YES NO   Completed on:    FAMILY HISTORY:  No pertinent family history in first degree relatives  Unable to obtain history due to encephalopathy.     Allergies    No Known Allergies    Intolerances    MEDICATIONS  (STANDING):  chlorhexidine 4% Liquid 1 Application(s) Topical <User Schedule>  dextrose 5% 1000 milliLiter(s) (125 mL/Hr) IV Continuous <Continuous>  influenza   Vaccine 0.5 milliLiter(s) IntraMuscular once  sodium chloride 0.9% lock flush 3 milliLiter(s) IV Push every 8 hours    MEDICATIONS  (PRN):  ondansetron Injectable 4 milliGRAM(s) IV Push every 6 hours PRN Nausea      PHYSICAL EXAM:    Vital Signs Last 24 Hrs  T(C): 36.4 (04 Sep 2019 08:30), Max: 36.5 (04 Sep 2019 04:29)  T(F): 97.6 (04 Sep 2019 08:30), Max: 97.7 (04 Sep 2019 04:29)  HR: 97 (04 Sep 2019 08:30) (78 - 99)  BP: 104/70 (04 Sep 2019 08:30) (78/51 - 110/59)  BP(mean): 66 (04 Sep 2019 02:20) (66 - 66)  RR: 20 (04 Sep 2019 08:30) (16 - 22)  SpO2: 90% (04 Sep 2019 08:30) (90% - 100%)    General: elderly. Resting comfortably. No acute distress.   HEENT: mucous membrane dry  Lungs: ctab. no rales, rhonchi, wheezing. non-labored. O2NC  CV: +s1/s2. rrr  GI:+ bowel sound. abdomen soft, non-tender cholecystotomy drain  MSK: Moves all 4 extremities.  No cyanosis or edema. weakness.   Neuro: nonfocal. lethargic. open eyes to verbal stimuli but falls back asleep.    : +Rt nephrostomy tube   Skin: warm and dry. pale    LABS:                        10.7   9.58  )-----------( 368      ( 03 Sep 2019 20:24 )             32.7     -    138  |  109<H>  |  124.0<H>  ----------------------------<  126<H>  4.3   |  8.0<LL>  |  7.63<H>    Ca    8.5<L>      04 Sep 2019 06:25  Phos  9.3     -  Mg     1.6         TPro  6.9  /  Alb  3.1<L>  /  TBili  0.3<L>  /  DBili  x   /  AST  21  /  ALT  12  /  AlkPhos  85  -03    PT/INR - ( 03 Sep 2019 20:24 )   PT: 12.9 sec;   INR: 1.12 ratio         PTT - ( 03 Sep 2019 20:24 )  PTT:33.4 sec  Urinalysis Basic - ( 04 Sep 2019 05:14 )    Color: Yellow / Appearance: very cloudy / S.020 / pH: x  Gluc: x / Ketone: Negative  / Bili: Negative / Urobili: Negative mg/dL   Blood: x / Protein: 30 mg/dL / Nitrite: Negative   Leuk Esterase: Moderate / RBC: 6-10 /HPF / WBC >50   Sq Epi: x / Non Sq Epi: Few / Bacteria: Few        RADIOLOGY & ADDITIONAL STUDIES:     CT A/P 9/3/19  FINDINGS:    LOWER CHEST: No change of large hiatal hernia containing the entire   stomach, and small and large bowel loops.  LIVER: Within normal limits.  BILE DUCTS: Known multiple common bile duct stones are again noted and   less prominent on the current exam.  GALLBLADDER: Percutaneous cholecystostomy tube in place.  SPLEEN: Within normal limits.  PANCREAS: Within normal limits.  ADRENALS: Within normal limits.  KIDNEYS/URETERS: Atrophic kidneys with cortical scarring, more in the right.  BLADDER: Within normal limits.  REPRODUCTIVE ORGANS: Fibroid uterus.  BOWEL: No bowel obstruction. Surgical anastomosis in the right lower   quadrant due to known right hemicolectomy. Colonic diverticulosis without   diverticulitis. No thickening with presacral stranding which may indicate stercoral colitis.  PERITONEUM: No ascites.  VESSELS: Extensive vascular calcification.  RETROPERITONEUM/LYMPH NODES: No lymphadenopathy. Drainage catheter in the   right posterior pararenal space. Further decrease of collection with   minimal residual fluid.    ABDOMINAL WALL: Within normal limits.  BONES: Advanced degenerative spondylosis with facet joint arthrosis..    IMPRESSION:   Further decrease of retroperitoneal collection.  Findings concerning for stercoral colitis.  Otherwise no change.      Thank you for the opportunity to assist with the care of this patient.   [place] [facility] Consult Service [phone]. PALLIATIVE CONSULT    CC: Patient is a 89y old  Female who presents with a chief complaint of Confusion, Dehydration, Uremia (04 Sep 2019 08:42)    HPI:  Mrs. De La Cruz is a 89 year old female resident of [facility] with PMHx of Colon Cancer s/p resection, HTN, HLD sent from NH due to critical abnormal outpatient blood work showing acute renal failure. Pt hospitalized in 2019 perinephric abscess s/p right nephrostomy tube and acute cholecystitis s/p cholecystostomy tube as family declined surgical intervention due to high risk nature at that time. She continues to drain from nephrostomy tube and with intermittent pain treated with NSAIDs. In ER, lab work revealed severe MARY, uremia (132/8) and anion gap metabolic acidosis. Nephrology consulted and recommended IVF D5 with Bicarb. GOC initiated by primary and nephrology team. Dtr Caren desires conservative medical management, would NOT want any invasive procedures, surgeries or hemodialysis. A MOLST completed back on 19 for DNR/I, confirmed by dtr. Palliative consulted for support and ongoing goc.     Pt is a poor historian, info from family, chart review and medical staff. Pt lethargic, minimally responsive, opens eye to verbal stimuli but falls back asleep.    Unable to perform ROS due to lethargy.    PERTINENT PMH REVIEWED: Yes     PAST MEDICAL & SURGICAL HISTORY:  History of cholecystitis  Perinephric abscess  HLD (hyperlipidemia)  Hiatal hernia  Colon cancer  HTN (hypertension)  H/O hemicolectomy      SOCIAL HISTORY:    Admitted from:  [facility]      Baseline ADLs (prior to admission):  mostly dependent, bedbound at baseline  Karnofsky: 20 %    Surrogate/HCP/Guardian:   - surrogate is daughter Caren Hernandez [phone]    ADVANCE DIRECTIVES:   DNR YES NO  Completed on:                     MOLST  YES NO   Completed on: 19 copy in chart from NH  Living Will  YES NO   Completed on:    FAMILY HISTORY:  No pertinent family history in first degree relatives  Unable to obtain history due to encephalopathy.     Allergies    No Known Allergies    Intolerances    MEDICATIONS  (STANDING):  chlorhexidine 4% Liquid 1 Application(s) Topical <User Schedule>  dextrose 5% 1000 milliLiter(s) (125 mL/Hr) IV Continuous <Continuous>  influenza   Vaccine 0.5 milliLiter(s) IntraMuscular once  sodium chloride 0.9% lock flush 3 milliLiter(s) IV Push every 8 hours    MEDICATIONS  (PRN):  ondansetron Injectable 4 milliGRAM(s) IV Push every 6 hours PRN Nausea      PHYSICAL EXAM:    Vital Signs Last 24 Hrs  T(C): 36.4 (04 Sep 2019 08:30), Max: 36.5 (04 Sep 2019 04:29)  T(F): 97.6 (04 Sep 2019 08:30), Max: 97.7 (04 Sep 2019 04:29)  HR: 97 (04 Sep 2019 08:30) (78 - 99)  BP: 104/70 (04 Sep 2019 08:30) (78/51 - 110/59)  BP(mean): 66 (04 Sep 2019 02:20) (66 - 66)  RR: 20 (04 Sep 2019 08:30) (16 - 22)  SpO2: 90% (04 Sep 2019 08:30) (90% - 100%)    General: elderly. Resting comfortably. No acute distress.   HEENT: mucous membrane dry  Lungs: ctab. no rales, rhonchi, wheezing. non-labored. O2NC  CV: +s1/s2. rrr  GI:+ bowel sound. abdomen soft, non-tender cholecystotomy drain  MSK: Moves all 4 extremities.  No cyanosis or edema. weakness.   Neuro: nonfocal. lethargic. open eyes to verbal stimuli but falls back asleep.    : +Rt nephrostomy tube   Skin: warm and dry. pale    LABS:                        10.7   9.58  )-----------( 368      ( 03 Sep 2019 20:24 )             32.7     -    138  |  109<H>  |  124.0<H>  ----------------------------<  126<H>  4.3   |  8.0<LL>  |  7.63<H>    Ca    8.5<L>      04 Sep 2019 06:25  Phos  9.3     -  Mg     1.6         TPro  6.9  /  Alb  3.1<L>  /  TBili  0.3<L>  /  DBili  x   /  AST  21  /  ALT  12  /  AlkPhos  85  -03    PT/INR - ( 03 Sep 2019 20:24 )   PT: 12.9 sec;   INR: 1.12 ratio         PTT - ( 03 Sep 2019 20:24 )  PTT:33.4 sec  Urinalysis Basic - ( 04 Sep 2019 05:14 )    Color: Yellow / Appearance: very cloudy / S.020 / pH: x  Gluc: x / Ketone: Negative  / Bili: Negative / Urobili: Negative mg/dL   Blood: x / Protein: 30 mg/dL / Nitrite: Negative   Leuk Esterase: Moderate / RBC: 6-10 /HPF / WBC >50   Sq Epi: x / Non Sq Epi: Few / Bacteria: Few        RADIOLOGY & ADDITIONAL STUDIES:     CT A/P 9/3/19  FINDINGS:    LOWER CHEST: No change of large hiatal hernia containing the entire   stomach, and small and large bowel loops.  LIVER: Within normal limits.  BILE DUCTS: Known multiple common bile duct stones are again noted and   less prominent on the current exam.  GALLBLADDER: Percutaneous cholecystostomy tube in place.  SPLEEN: Within normal limits.  PANCREAS: Within normal limits.  ADRENALS: Within normal limits.  KIDNEYS/URETERS: Atrophic kidneys with cortical scarring, more in the right.  BLADDER: Within normal limits.  REPRODUCTIVE ORGANS: Fibroid uterus.  BOWEL: No bowel obstruction. Surgical anastomosis in the right lower   quadrant due to known right hemicolectomy. Colonic diverticulosis without   diverticulitis. No thickening with presacral stranding which may indicate stercoral colitis.  PERITONEUM: No ascites.  VESSELS: Extensive vascular calcification.  RETROPERITONEUM/LYMPH NODES: No lymphadenopathy. Drainage catheter in the   right posterior pararenal space. Further decrease of collection with   minimal residual fluid.    ABDOMINAL WALL: Within normal limits.  BONES: Advanced degenerative spondylosis with facet joint arthrosis..    IMPRESSION:   Further decrease of retroperitoneal collection.  Findings concerning for stercoral colitis.  Otherwise no change.      Thank you for the opportunity to assist with the care of this patient.   [place] [facility] Consult Service [phone].

## 2019-09-04 NOTE — H&P ADULT - PROBLEM SELECTOR PLAN 1
Likely multi-factorial from NSAID use, Dehydration, poor PO intake, AIN from recent abx?? Cont. Bicarb drip, stop nephrotoxic drugs, check urine eos, check U/A. Repeat labs in AM. Daughter does not want HD and if patient fails to improve she wishes to peruse comfort care. DNR/DNI, Palliative care consult placed to help with GOC.

## 2019-09-04 NOTE — PROGRESS NOTE ADULT - SUBJECTIVE AND OBJECTIVE BOX
Nephrology Telephone Note    Called by Dr. Steven in ER; pt uremic/acidotic  Extensive urologic / infx history;  Start D5 with 150 meq of NaHCO3 @ 100cc/hr  Full consult to follow once seen in AM

## 2019-09-04 NOTE — CONSULT NOTE ADULT - ASSESSMENT
1) ATN  2) Acidosis  3) Uremia  4) HTN    Held long discussion with family  Will not offer dialysis;   Family wishing for comfort care and removal of all drains; etc;  Referring to palliative care;   Signing off  Please recall if needed    d/w Dr Smith and Dr. Corral

## 2019-09-04 NOTE — DISCHARGE NOTE PROVIDER - CARE PROVIDER_API CALL
Chance Paredes (DO)  Family Medicine  [address], [address]  [place] [address]  Phone: [phone]  Fax: [phone]  Follow Up Time:

## 2019-09-04 NOTE — DISCHARGE NOTE PROVIDER - NSDCCPCAREPLAN_GEN_ALL_CORE_FT
PRINCIPAL DISCHARGE DIAGNOSIS  Diagnosis: Renal failure  Assessment and Plan of Treatment:       SECONDARY DISCHARGE DIAGNOSES  Diagnosis: Acidemia  Assessment and Plan of Treatment:     Diagnosis: Hyperkalemia  Assessment and Plan of Treatment:

## 2019-09-04 NOTE — DISCHARGE NOTE PROVIDER - HOSPITAL COURSE
89 y/o female from [place] coming in with Daughter after o/p labs showed patient to be in acute renal failure. Labs done as patient with increasing lethargy, confusion and poor po intake. Patient has history of Colon cancer, HLD, HTN. She is s/p right nephrostomy tube for perinephric abscess in 5/19 and is s/p cholecystostomy tube in 5/19 for acute cholecystitis in which Daughter declined surgery due patient being high risk. She has had nephrostomy tube change in 7/19. She had completed abx for both infections, however as per Daughter both have been left in for continued drainage. Review of MAR at facility shows no abx. However shows she has been getting NSAIDs for pain related to position of right nephrostomy pain. No reported fevers, chills, N/V, SOB. Patient has been mostly bedbound recently due to weakness. In the ED patient with BUN of 132, Cr. of 8 and hco03 of 6 with metabolic acidosis. Nephrology contacted and recommended patient start bicarb drip. Based on extend of MARY, and evidence of uremia and acidosis Daughter informed of possible need for HD. She is against any invasive procedures or surgery. She does not want her Mother getting HD. MOLST form active from facility and states DNR/DNI which Daughter verified. She wants conservative measures and if they fail, she is agreeable to comfort care measures. 89 y/o female from [place] coming in with Daughter after o/p labs showed patient to be in acute renal failure. Labs done as patient with increasing lethargy, confusion and poor po intake. Patient has history of Colon cancer, HLD, HTN. She is s/p right nephrostomy tube for perinephric abscess in 5/19 and is s/p cholecystostomy tube in 5/19 for acute cholecystitis in which Daughter declined surgery due patient being high risk. She has had nephrostomy tube change in 7/19. She had completed abx for both infections, however as per Daughter both have been left in for continued drainage. Review of MAR at facility shows no abx. However shows she has been getting NSAIDs for pain related to position of right nephrostomy pain. No reported fevers, chills, N/V, SOB. Patient has been mostly bedbound recently due to weakness. In the ED patient with BUN of 132, Cr. of 8 and hco03 of 6 with metabolic acidosis. Nephrology contacted and recommended patient start bicarb drip. Based on extend of MARY, and evidence of uremia and acidosis Daughter informed of possible need for HD. She is against any invasive procedures or surgery. She does not want her Mother getting HD. MOLST form active from facility and states DNR/DNI which Daughter verified. She wants conservative measures and if they fail, she is agreeable to comfort care measures.      Family decided on comfort measures    will meet with hospice 89 y/o female from [place] coming in with Daughter after o/p labs showed patient to be in acute renal failure. Labs done as patient with increasing lethargy, confusion and poor po intake. Patient has history of Colon cancer, HLD, HTN. She is s/p right nephrostomy tube for perinephric abscess in 5/19 and is s/p cholecystostomy tube in 5/19 for acute cholecystitis in which Daughter declined surgery due patient being high risk. She has had nephrostomy tube change in 7/19. She had completed abx for both infections, however as per Daughter both have been left in for continued drainage. Review of MAR at facility shows no abx. However shows she has been getting NSAIDs for pain related to position of right nephrostomy pain. No reported fevers, chills, N/V, SOB. Patient has been mostly bedbound recently due to weakness. In the ED patient with BUN of 132, Cr. of 8 and hco03 of 6 with metabolic acidosis. Nephrology contacted and recommended patient start bicarb drip. Based on extend of MARY, and evidence of uremia and acidosis Daughter informed of possible need for HD. She is against any invasive procedures or surgery. She does not want her Mother getting HD. MOLST form active from facility and states DNR/DNI which Daughter verified. She wants conservative measures and if they fail, she is agreeable to comfort care measures.      Family decided on comfort measures    will meet with hospice      pt going to hospice [facility]

## 2019-09-04 NOTE — H&P ADULT - NSICDXPASTMEDICALHX_GEN_ALL_CORE_FT
PAST MEDICAL HISTORY:  Colon cancer     Hiatal hernia     History of cholecystitis     HLD (hyperlipidemia)     HTN (hypertension)     Perinephric abscess

## 2019-09-04 NOTE — PROGRESS NOTE ADULT - SUBJECTIVE AND OBJECTIVE BOX
Patient is a 89y old  Female who presents with a chief complaint of Confusion  Dehydration  Uremia (04 Sep 2019 08:25)      NEUROLOGIC:  ondansetron Injectable 4 milliGRAM(s) IV Push every 6 hours PRN      IV FLUID/NUTRITION:  dextrose 5% 1000 milliLiter(s) IV Continuous <Continuous>  sodium chloride 0.9% lock flush 3 milliLiter(s) IV Push every 8 hours      IMMUNOLOGIC & OTHER  influenza   Vaccine 0.5 milliLiter(s) IntraMuscular once      Allergies    No Known Allergies        Vital Signs Last 24 Hrs  T(C): 36.4 (04 Sep 2019 08:30), Max: 36.5 (04 Sep 2019 04:29)  T(F): 97.6 (04 Sep 2019 08:30), Max: 97.7 (04 Sep 2019 04:29)  HR: 97 (04 Sep 2019 08:30) (78 - 99)  BP: 104/70 (04 Sep 2019 08:30) (78/51 - 110/59)  BP(mean): 66 (04 Sep 2019 02:20) (66 - 66)  RR: 20 (04 Sep 2019 08:30) (16 - 22)  SpO2: 90% (04 Sep 2019 08:30) (90% - 100%)              REVIEW OF SYSTEMS:    pt not speaking   +shivering       PHYSICAL EXAM:    GENERAL: ill looking  HEAD:  Atraumatic, Normocephalic  EYES: EOMI, PERRLA, conjunctiva and sclera clear  NECK: Supple, No JVD, Normal thyroid  NERVOUS SYSTEM:  Alert, followed simple commands but not oriented   CHEST/LUNG: Clear to percussion bilaterally; No rales, rhonchi, wheezing, or rubs  HEART: Regular rate and rhythm; No murmurs, rubs, or gallops  ABDOMEN: Soft, Nontender, Nondistended; Bowel sounds present  R flank with drain tube containing serosanguinous fluid   EXTREMITIES:  2+ Peripheral Pulses, No clubbing, cyanosis, or edema  LYMPH: No lymphadenopathy noted        LABS:                        10.7   9.58  )-----------( 368      ( 03 Sep 2019 20:24 )             32.7     CBC Full  -  ( 03 Sep 2019 20:24 )  WBC Count : 9.58 K/uL  RBC Count : 3.42 M/uL  Hemoglobin : 10.7 g/dL  Hematocrit : 32.7 %  Platelet Count - Automated : 368 K/uL  Mean Cell Volume : 95.6 fl  Mean Cell Hemoglobin : 31.3 pg  Mean Cell Hemoglobin Concentration : 32.7 gm/dL  Auto Neutrophil # : 6.45 K/uL  Auto Lymphocyte # : 1.89 K/uL  Auto Monocyte # : 0.90 K/uL  Auto Eosinophil # : 0.22 K/uL  Auto Basophil # : 0.03 K/uL  Auto Neutrophil % : 67.4 %  Auto Lymphocyte % : 19.7 %  Auto Monocyte % : 9.4 %  Auto Eosinophil % : 2.3 %  Auto Basophil % : 0.3 %        138  |  109<H>  |  124.0<H>  ----------------------------<  126<H>  4.3   |  8.0<LL>  |  7.63<H>    Ca    8.5<L>      04 Sep 2019 06:25  Phos  9.3       Mg     1.6         TPro  6.9  /  Alb  3.1<L>  /  TBili  0.3<L>  /  DBili  x   /  AST  21  /  ALT  12  /  AlkPhos  85      PT/INR - ( 03 Sep 2019 20:24 )   PT: 12.9 sec;   INR: 1.12 ratio         PTT - ( 03 Sep 2019 20:24 )  PTT:33.4 sec  Urinalysis Basic - ( 04 Sep 2019 05:14 )    Color: Yellow / Appearance: very cloudy / S.020 / pH: x  Gluc: x / Ketone: Negative  / Bili: Negative / Urobili: Negative mg/dL   Blood: x / Protein: 30 mg/dL / Nitrite: Negative   Leuk Esterase: Moderate / RBC: 6-10 /HPF / WBC >50   Sq Epi: x / Non Sq Epi: Few / Bacteria: Few      Hemoglobin A1C, Whole Blood: 5.2 % ( @ 10:37)          Albumin, Serum: 3.1 g/dL ( @ 20:24)    LIVER FUNCTIONS - ( 03 Sep 2019 20:24 )  Alb: 3.1 g/dL / Pro: 6.9 g/dL / ALK PHOS: 85 U/L / ALT: 12 U/L / AST: 21 U/L / GGT: x             RADIOLOGY & ADDITIONAL TESTS:  < from: CT Abdomen and Pelvis No Cont (09.03.19 @ 22:30) >  IMPRESSION:     Further decrease of retroperitoneal collection.    Findings concerning for stercoral colitis.    Otherwise no change.    < end of copied text >

## 2019-09-04 NOTE — CONSULT NOTE ADULT - SUBJECTIVE AND OBJECTIVE BOX
[facility] DIVISION OF KIDNEY DISEASES AND HYPERTENSION -- INITIAL CONSULT NOTE  --------------------------------------------------------------------------------  HPI:    89 y/o female from [place] coming in with Daughter after o/p labs showed patient to be in acute renal failure. Labs done as patient with increasing lethargy, confusion and poor po intake. Patient has history of Colon cancer, HLD, HTN. She is s/p right nephrostomy tube for perinephric abscess in 5/19 and is s/p cholecystostomy tube in 5/19 for acute cholecystitis in which Daughter declined surgery due patient being high risk. She has had nephrostomy tube change in 7/19. She had completed abx for both infections, however as per Daughter both have been left in for continued drainage. Review of MAR at facility shows no abx. However shows she has been getting NSAIDs for pain related to position of right nephrostomy pain. No reported fevers, chills, N/V, SOB. Patient has been mostly bedbound recently due to weakness. In the ED patient with BUN of 132, Cr. of 8 and hco03 of 6 with metabolic acidosis. Nephrology contacted and recommended patient start bicarb drip. Based on extend of MARY, and evidence of uremia and acidosis Daughter informed of possible need for HD. She is against any invasive procedures or surgery. She does not want her Mother getting HD. EMILIE form active from facility and states DNR/DNI which Daughter verified. She wants conservative measures and if they fail, she is agreeable to comfort care measures.     Pt seen/examined this AM ; lying in bed ; lethargic; poorly responsive; confused; family at bedside. Was started on bicarbonate overnight per my orders; with minimal improvement in renal function. Had long discussion with daughter/son at bedside.    PAST HISTORY  --------------------------------------------------------------------------------  PAST MEDICAL & SURGICAL HISTORY:  History of cholecystitis  Perinephric abscess  HLD (hyperlipidemia)  Hiatal hernia  Colon cancer  HTN (hypertension)  H/O hemicolectomy    FAMILY HISTORY:  No pertinent family history in first degree relatives    PAST SOCIAL HISTORY:    ALLERGIES & MEDICATIONS  --------------------------------------------------------------------------------  Allergies    No Known Allergies    Intolerances      Standing Inpatient Medications  chlorhexidine 4% Liquid 1 Application(s) Topical <User Schedule>  dextrose 5% 1000 milliLiter(s) IV Continuous <Continuous>  influenza   Vaccine 0.5 milliLiter(s) IntraMuscular once  sodium chloride 0.9% lock flush 3 milliLiter(s) IV Push every 8 hours    PRN Inpatient Medications  ondansetron Injectable 4 milliGRAM(s) IV Push every 6 hours PRN      REVIEW OF SYSTEMS  --------------------------------------------------------------------------------  Unable to obtain    VITALS/PHYSICAL EXAM  --------------------------------------------------------------------------------  T(C): 36.5 (09-04-19 @ 11:59), Max: 36.5 (09-04-19 @ 04:29)  HR: 70 (09-04-19 @ 11:59) (70 - 99)  BP: 84/50 (09-04-19 @ 11:59) (78/51 - 110/59)  RR: 20 (09-04-19 @ 11:59) (16 - 22)  SpO2: 99% (09-04-19 @ 11:59) (90% - 100%)  Wt(kg): --  Height (cm): 152.4 (09-03-19 @ 19:32)  Weight (kg): 63.5 (09-03-19 @ 19:32)  BMI (kg/m2): 27.3 (09-03-19 @ 19:32)  BSA (m2): 1.6 (09-03-19 @ 19:32)      Physical Exam:  General: elderly. Resting comfortably. No acute distress.   HEENT: mucous membrane dry  Lungs: ctab. no rales, rhonchi, wheezing. non-labored. O2NC  CV: +s1/s2. rrr  GI:+ bowel sound. abdomen soft, non-tender cholecystotomy drain  MSK: Moves all 4 extremities.  No cyanosis or edema. weakness.   Neuro: nonfocal. lethargic. open eyes to verbal stimuli but falls back asleep.    : +Rt nephrostomy tube   Skin: warm and dry. pale      LABS/STUDIES  --------------------------------------------------------------------------------              10.7   9.58  >-----------<  368      [09-03-19 @ 20:24]              32.7     138  |  109  |  124.0  ----------------------------<  126      [09-04-19 @ 06:25]  4.3   |  8.0  |  7.63        Ca     8.5     [09-04-19 @ 06:25]      Mg     1.6     [09-04-19 @ 06:25]      Phos  9.3     [09-04-19 @ 06:25]    TPro  6.9  /  Alb  3.1  /  TBili  0.3  /  DBili  x   /  AST  21  /  ALT  12  /  AlkPhos  85  [09-03-19 @ 20:24]    PT/INR: PT 12.9 , INR 1.12       [09-03-19 @ 20:24]  PTT: 33.4       [09-03-19 @ 20:24]      Creatinine Trend:  SCr 7.63 [09-04 @ 06:25]  SCr 8.04 [09-03 @ 20:24]    Urinalysis - [09-04-19 @ 05:14]      Color Yellow / Appearance very cloudy / SG 1.020 / pH 5.0      Gluc Negative / Ketone Negative  / Bili Negative / Urobili Negative       Blood Large / Protein 30 / Leuk Est Moderate / Nitrite Negative      RBC 6-10 / WBC >50 / Hyaline  / Gran  / Sq Epi  / Non Sq Epi Few / Bacteria Few    Urine Creatinine 95      [09-04-19 @ 05:14]  Urine Sodium 30      [09-04-19 @ 05:14]    PTH -- (Ca 10.1)      [05-23-19 @ 19:40]   30  HbA1c 5.2      [05-20-19 @ 10:37]      Immunofixation Serum:   No Monoclonal Band Identified    Reference Range: None Detected      [05-29-19 @ 22:20]

## 2019-09-04 NOTE — H&P ADULT - HISTORY OF PRESENT ILLNESS
89 y/o female from [place] coming in with Daughter after o/p labs showed patient to be in acute renal failure. Labs done as patient with increasing lethargy, confusion and poor po intake. Patient has history of Colon cancer, HLD, HTN. She is s/p right nephrostomy tube for perinephric abscess in 5/19 and is s/p cholecystostomy tube in 5/19 for acute cholecystitis in which Daughter declined surgery due patient being high risk. She has had nephrostomy tube change in 7/19. She had completed abx for both infections, however as per Daughter both have been left in for continued drainage. Review of MAR at facility shows no abx. However shows she has been getting NSAIDs for pain related to position of right nephrostomy pain. No reported fevers, chills, N/V, SOB. Patient has been mostly bedbound recently due to weakness. In the ED patient with BUN of 132, Cr. of 8 and hco03 of 6 with metabolic acidosis. Nephrology contacted and recommended patient start bicarb drip. Based on extend of MARY, and evidence of uremia and acidosis Daughter informed of possible need for HD. She is against any invasive procedures or surgery. She does not want her Mother getting HD. MOLST form active from facility and states DNR/DNI which Daughter verified. She wants conservative measures and if they fail, she is agreeable to comfort care measures.

## 2019-09-04 NOTE — H&P ADULT - ASSESSMENT
89 y/o female with MARY, uremia, metabolic acidosis, hyperkalemia, dehydration, hx of HTN, Colon Ca, Perinephric abscess, cholecystitis s/p cholecystostomy drain, s/p perinephric abscess drain

## 2019-09-05 NOTE — GOALS OF CARE CONVERSATION - PERSONAL ADVANCE DIRECTIVE - CONVERSATION DETAILS
Met with patient' s daughter Caren. Discussed Hospice care. Caren is receptive to patient going to the Hospice [facility]. Consents signed. Approval obtained for [facility]. No beds available at this time. Care team informed.
Writer/[facility] RN received phone call from Brad Garcia NP at the Hospice [facility] - as per NP Brad, a bed has now become available and patient may be transferred to the [facility] at 8pm this evening. Writer notified KITTY Rice and patient's daughter, Caren, regarding pending transfer. Writer also requested ambulance for 8pm  this evening.     Rosanna Veras, RN  [phone]
Writer/[facility] RN spoke to Brad Garcia NP at the Hospice [facility] - as per NP Brad, there is no bed availability today at the [facility], so will not be able to transfer patient today. Will notify CM and patient's family and will continue to follow.     Rosanna Veras RN  [phone]
Met with pt's dtr Caren and JULIA to introduce role of palliative care. Pt has 5 children (2 sons  and other 2 sons are estranged and not actively involved in pt's care). Dtr and her family has been her primary support system. Pt is lethargic and minimally engaged in conversation, only wanted to sleep.     Family report pt has been clinically declining since May 2019 after hospitalization for perinephritic abscess and acute cholecystitis and sent to [place] rehab. Pt never regained mobility, mostly bed/chair  bound and requires assistance with all ADLs. Family had met with nephrology prior to my visit. They acknowledge that pt is a poor candidate for HD nor would they want that for her given her age, frailty and debility. Family acknowledge her prognosis is very poor, days less likely weeks, and that we are looking at EOL. Their main goal is to ensure pt is comfortable and pain free in the time she has. Explored options including comfort measures in detail and hospice support. Dtr desires for transition to comfort measures at this time, no further blood draws, lab work, studies, reduce pill burden as these would not change pt's underlying acute issues and prognosis. Dtr confirm previous directives of DNR/I, MOLST. Dtr amendable for hospice evaluation. Will continue to optimize sx and pain control in particular right flank near site of cholecystotomy bag.     Updated hospice RN Rosanna Veras and hospitalist Dr. Medeiros

## 2019-09-05 NOTE — DISCHARGE NOTE NURSING/CASE MANAGEMENT/SOCIAL WORK - PATIENT PORTAL LINK FT
You can access the FollowMyHealth Patient Portal offered by [facility] by registering at the following website: [email or web address]. By joining [facility]’s FollowMyHealth portal, you will also be able to view your health information using other applications (apps) compatible with our system.

## 2019-09-05 NOTE — GOALS OF CARE CONVERSATION - PERSONAL ADVANCE DIRECTIVE - NS PRO AD PATIENT TYPE
Do Not Resuscitate (DNR)/Medical Orders for Life-Sustaining Treatment (MOLST)
Do Not Resuscitate (DNR)/Medical Orders for Life-Sustaining Treatment (MOLST)
Medical Orders for Life-Sustaining Treatment (MOLST)/Do Not Resuscitate (DNR)
Medical Orders for Life-Sustaining Treatment (MOLST)/Do Not Resuscitate (DNR)

## 2019-09-05 NOTE — GOALS OF CARE CONVERSATION - PERSONAL ADVANCE DIRECTIVE - NS PRO AD PATIENT TYPE ON CHART
Do Not Resuscitate (DNR)/Medical Orders for Life-Sustaining Treatment (MOLST)
Do Not Resuscitate (DNR)/Medical Orders for Life-Sustaining Treatment (MOLST)
Medical Orders for Life-Sustaining Treatment (MOLST)/Do Not Resuscitate (DNR)
Do Not Resuscitate (DNR)/Medical Orders for Life-Sustaining Treatment (MOLST)

## 2019-09-05 NOTE — PROGRESS NOTE ADULT - SUBJECTIVE AND OBJECTIVE BOX
FOLLOW UP VISIT    INTERVAL HPI/OVERNIGHT EVENTS:  No acute events overnight. Pt resting comfortably, wakes to verbal stimuli and denied any acute complaints. Hospice team met with pt's dtr yesterday, consent signed and approved for inpatient hospice pending bed availability.     Present Symptoms:   Dyspnea:  No   Nausea/Vomiting:  No  Fatigue: Yes   Loss of appetite: Yes  Pain: comfortable    Limited ROS due to lethargy.      MEDICATIONS  (STANDING):  chlorhexidine 4% Liquid 1 Application(s) Topical <User Schedule>  sodium chloride 0.9% lock flush 3 milliLiter(s) IV Push every 8 hours    MEDICATIONS  (PRN):  acetaminophen  IVPB .. 1000 milliGRAM(s) IV Intermittent once PRN Mild Pain (1 - 3), Moderate Pain (4 - 6)  HYDROmorphone  Injectable 0.5 milliGRAM(s) IV Push every 6 hours PRN breakthrough pain, dyspnea  ondansetron Injectable 4 milliGRAM(s) IV Push every 6 hours PRN Nausea      PHYSICAL EXAM:    Vital Signs Last 24 Hrs  T(C): 36.9 (05 Sep 2019 07:53), Max: 36.9 (05 Sep 2019 07:53)  T(F): 98.5 (05 Sep 2019 07:53), Max: 98.5 (05 Sep 2019 07:53)  HR: 93 (05 Sep 2019 07:53) (70 - 93)  BP: 104/61 (05 Sep 2019 07:53) (84/50 - 104/61)  BP(mean): --  RR: 18 (05 Sep 2019 07:53) (18 - 22)  SpO2: 97% (05 Sep 2019 07:53) (91% - 99%)    General: elderly. Resting comfortably. No acute distress.   HEENT: mucous membrane dry  Lungs: ctab. no rales, rhonchi, wheezing. non-labored. O2NC  CV: +s1/s2. rrr  GI:+ bowel sound. abdomen soft, non-tender cholecystotomy drain  MSK: Moves all 4 extremities.  No cyanosis or edema. weakness.   Neuro: nonfocal. lethargic. open eyes to verbal stimuli but falls back asleep.    : +Rt nephrostomy tube   Skin: warm and dry. pale    LABS:                          10.7   9.58  )-----------( 368      ( 03 Sep 2019 20:24 )             32.7     09-04    138  |  109<H>  |  124.0<H>  ----------------------------<  126<H>  4.3   |  8.0<LL>  |  7.63<H>    Ca    8.5<L>      04 Sep 2019 06:25  Phos  9.3       Mg     1.6         TPro  6.9  /  Alb  3.1<L>  /  TBili  0.3<L>  /  DBili  x   /  AST  21  /  ALT  12  /  AlkPhos  85      PT/INR - ( 03 Sep 2019 20:24 )   PT: 12.9 sec;   INR: 1.12 ratio         PTT - ( 03 Sep 2019 20:24 )  PTT:33.4 sec  Urinalysis Basic - ( 04 Sep 2019 05:14 )    Color: Yellow / Appearance: very cloudy / S.020 / pH: x  Gluc: x / Ketone: Negative  / Bili: Negative / Urobili: Negative mg/dL   Blood: x / Protein: 30 mg/dL / Nitrite: Negative   Leuk Esterase: Moderate / RBC: 6-10 /HPF / WBC >50   Sq Epi: x / Non Sq Epi: Few / Bacteria: Few    RADIOLOGY & ADDITIONAL STUDIES: Reviewed, no new recent studies    ADVANCE DIRECTIVES: DNR/IEMILIE from 2019 FOLLOW UP VISIT    INTERVAL HPI/OVERNIGHT EVENTS:  No acute events overnight per RN. Long discussion with dtr Caren and JULIA Galvan, transitioned to comfort measures. Hospice team met with family as well, consent signed and approved for inpatient hospice pending bed availability.     Present Symptoms:   Dyspnea:  No   Nausea/Vomiting:  No  Fatigue: Yes   Loss of appetite: Yes, minimal oral intake per RN and family  Pain: comfortable    Limited ROS due to lethargy.      MEDICATIONS  (STANDING):  chlorhexidine 4% Liquid 1 Application(s) Topical <User Schedule>  sodium chloride 0.9% lock flush 3 milliLiter(s) IV Push every 8 hours    MEDICATIONS  (PRN):  acetaminophen  IVPB .. 1000 milliGRAM(s) IV Intermittent once PRN Mild Pain (1 - 3), Moderate Pain (4 - 6)  HYDROmorphone  Injectable 0.5 milliGRAM(s) IV Push every 6 hours PRN breakthrough pain, dyspnea  ondansetron Injectable 4 milliGRAM(s) IV Push every 6 hours PRN Nausea      PHYSICAL EXAM:    Vital Signs Last 24 Hrs  T(C): 36.9 (05 Sep 2019 07:53), Max: 36.9 (05 Sep 2019 07:53)  T(F): 98.5 (05 Sep 2019 07:53), Max: 98.5 (05 Sep 2019 07:53)  HR: 93 (05 Sep 2019 07:53) (70 - 93)  BP: 104/61 (05 Sep 2019 07:53) (84/50 - 104/61)  BP(mean): --  RR: 18 (05 Sep 2019 07:53) (18 - 22)  SpO2: 97% (05 Sep 2019 07:53) (91% - 99%)    General: elderly. Resting comfortably. No acute distress.   HEENT: mmm  Lungs: ctab.  non-labored.    CV: +s1/s2. rrr  GI:+ bowel sound. abdomen soft, non-tender +cholecystotomy drain  MSK:  No cyanosis. Trace bilateral edema of hands. weakness.   Neuro: nonfocal. drowsy. open eyes to verbal stimuli but falls back asleep.    : +Rt nephrostomy tube   Skin: warm and dry. pale    LABS:                          10.7   9.58  )-----------( 368      ( 03 Sep 2019 20:24 )             32.7     09-04    138  |  109<H>  |  124.0<H>  ----------------------------<  126<H>  4.3   |  8.0<LL>  |  7.63<H>    Ca    8.5<L>      04 Sep 2019 06:25  Phos  9.3       Mg     1.6         TPro  6.9  /  Alb  3.1<L>  /  TBili  0.3<L>  /  DBili  x   /  AST  21  /  ALT  12  /  AlkPhos  85      PT/INR - ( 03 Sep 2019 20:24 )   PT: 12.9 sec;   INR: 1.12 ratio         PTT - ( 03 Sep 2019 20:24 )  PTT:33.4 sec  Urinalysis Basic - ( 04 Sep 2019 05:14 )    Color: Yellow / Appearance: very cloudy / S.020 / pH: x  Gluc: x / Ketone: Negative  / Bili: Negative / Urobili: Negative mg/dL   Blood: x / Protein: 30 mg/dL / Nitrite: Negative   Leuk Esterase: Moderate / RBC: 6-10 /HPF / WBC >50   Sq Epi: x / Non Sq Epi: Few / Bacteria: Few    RADIOLOGY & ADDITIONAL STUDIES: Reviewed, no new recent studies    ADVANCE DIRECTIVES: DNR/IEMILIE from 2019

## 2019-09-05 NOTE — PROGRESS NOTE ADULT - ASSESSMENT
repeat labs in am  blood cul obtained  Renal consult pending  palliative consult pending
family has requested comfort only and hospice to see pt today
89 year old female with colon cancer s/p resection, HTN, HLD, recent hospitalization for perinephritic abscess s/p nephrotomy tube and acute cholecystitis s/p cholecystotomy tube (declined surgery in 5/2019) and resident from [facility] sent for abnormal lab work found to have severe MARY, uremia and anion gap metabolic acidosis. Family meeting held with dttania/JULIA yesterday, ultimately family opted for transition to comfort measures and hospice eval. Hospice RN met with family as well, approved for inpatient unit pending bed availability.     PLAN    MARY/Uremia/Metabolic Acidosis  - seen by nephrology, poor candidate for dialysis   - family opted for comfort measure    Rt Flank Pain  - comfortable, on IV APAP 1g PRN and IV dilaudid 0.5 mg PRN (given renal status)    Anorexia  - minimal oral intake, encourage as tolerated    Palliative Care Encounter  Pt on comfort measures. Accepted to [facility] inpatient hospice unit, pending bed availability. Prognosis days less likely weeks. Pain stable on current regimen. Will continue to follow to optimize sx control.

## 2019-09-05 NOTE — PROGRESS NOTE ADULT - SUBJECTIVE AND OBJECTIVE BOX
Patient is a 89y old  Female who presents with a chief complaint of Confusion  Dehydration  Uremia (04 Sep 2019 12:15)    NEUROLOGIC:  acetaminophen  IVPB .. 1000 milliGRAM(s) IV Intermittent once PRN  HYDROmorphone  Injectable 0.5 milliGRAM(s) IV Push every 6 hours PRN  ondansetron Injectable 4 milliGRAM(s) IV Push every 6 hours PRN      IV FLUID/NUTRITION:  dextrose 5% 1000 milliLiter(s) IV Continuous <Continuous>  sodium chloride 0.9% lock flush 3 milliLiter(s) IV Push every 8 hours    TOPICAL:  chlorhexidine 4% Liquid 1 Application(s) Topical <User Schedule>          Allergies    No Known Allergies            Vital Signs Last 24 Hrs  T(C): 36.9 (05 Sep 2019 07:53), Max: 36.9 (05 Sep 2019 07:53)  T(F): 98.5 (05 Sep 2019 07:53), Max: 98.5 (05 Sep 2019 07:53)  HR: 93 (05 Sep 2019 07:53) (70 - 97)  BP: 104/61 (05 Sep 2019 07:53) (84/50 - 104/70)  BP(mean): --  RR: 18 (05 Sep 2019 07:53) (18 - 22)  SpO2: 97% (05 Sep 2019 07:53) (90% - 99%)          REVIEW OF SYSTEMS:    CONSTITUTIONAL: No fever, weight loss, or fatigue  denies pain this am   EYES: No eye pain, visual disturbances, or discharge  ENMT:  No difficulty hearing, tinnitus, vertigo; No sinus or throat pain  NECK: No pain or stiffness  RESPIRATORY: No cough, wheezing, chills or hemoptysis; No shortness of breath  CARDIOVASCULAR: No chest pain, palpitations, dizziness, or leg swelling  GASTROINTESTINAL: No abdominal or epigastric pain. No nausea, vomiting, or hematemesis; No diarrhea or constipation. No melena or hematochezia.  GENITOURINARY: No dysuria, frequency, hematuria, or incontinence  NEUROLOGICAL: No headaches, memory loss, loss of strength, numbness, or tremors  SKIN: No itching, burning, rashes, or lesions   LYMPH NODES: No enlarged glands  ENDOCRINE: No heat or cold intolerance; No hair loss      PHYSICAL EXAM:    GENERAL: NAD, well-groomed, well-developed  HEAD:  Atraumatic, Normocephalic  EYES: EOMI, PERRLA, conjunctiva and sclera clear  NECK: Supple, No JVD, Normal thyroid  NERVOUS SYSTEM:  Alert   CHEST/LUNG: Clear to percussion bilaterally; No rales, rhonchi, wheezing, or rubs  HEART: Regular rate and rhythm; No murmurs, rubs, or gallops  ABDOMEN: Soft, Nontender, Nondistended; Bowel sounds present  with SEAN drain in R flank area   EXTREMITIES:  2+ Peripheral Pulses, No clubbing, cyanosis, or edema  LYMPH: No lymphadenopathy noted        LABS:                        10.7   9.58  )-----------( 368      ( 03 Sep 2019 20:24 )             32.7     CBC Full  -  ( 03 Sep 2019 20:24 )  WBC Count : 9.58 K/uL  RBC Count : 3.42 M/uL  Hemoglobin : 10.7 g/dL  Hematocrit : 32.7 %  Platelet Count - Automated : 368 K/uL  Mean Cell Volume : 95.6 fl  Mean Cell Hemoglobin : 31.3 pg  Mean Cell Hemoglobin Concentration : 32.7 gm/dL  Auto Neutrophil # : 6.45 K/uL  Auto Lymphocyte # : 1.89 K/uL  Auto Monocyte # : 0.90 K/uL  Auto Eosinophil # : 0.22 K/uL  Auto Basophil # : 0.03 K/uL  Auto Neutrophil % : 67.4 %  Auto Lymphocyte % : 19.7 %  Auto Monocyte % : 9.4 %  Auto Eosinophil % : 2.3 %  Auto Basophil % : 0.3 %        138  |  109<H>  |  124.0<H>  ----------------------------<  126<H>  4.3   |  8.0<LL>  |  7.63<H>    Ca    8.5<L>      04 Sep 2019 06:25  Phos  9.3     -  Mg     1.6         TPro  6.9  /  Alb  3.1<L>  /  TBili  0.3<L>  /  DBili  x   /  AST  21  /  ALT  12  /  AlkPhos  85      PT/INR - ( 03 Sep 2019 20:24 )   PT: 12.9 sec;   INR: 1.12 ratio         PTT - ( 03 Sep 2019 20:24 )  PTT:33.4 sec  Urinalysis Basic - ( 04 Sep 2019 05:14 )    Color: Yellow / Appearance: very cloudy / S.020 / pH: x  Gluc: x / Ketone: Negative  / Bili: Negative / Urobili: Negative mg/dL   Blood: x / Protein: 30 mg/dL / Nitrite: Negative   Leuk Esterase: Moderate / RBC: 6-10 /HPF / WBC >50   Sq Epi: x / Non Sq Epi: Few / Bacteria: Few      Hemoglobin A1C, Whole Blood: 5.2 % ( @ 10:37)            LIVER FUNCTIONS - ( 03 Sep 2019 20:24 )  Alb: 3.1 g/dL / Pro: 6.9 g/dL / ALK PHOS: 85 U/L / ALT: 12 U/L / AST: 21 U/L / GGT: x             RADIOLOGY & ADDITIONAL TESTS:

## 2019-09-16 PROBLEM — Z87.19 PERSONAL HISTORY OF OTHER DISEASES OF THE DIGESTIVE SYSTEM: Chronic | Status: ACTIVE | Noted: 2019-01-01

## 2019-09-16 PROBLEM — N15.1 RENAL AND PERINEPHRIC ABSCESS: Chronic | Status: ACTIVE | Noted: 2019-01-01

## 2019-09-24 NOTE — PHYSICAL THERAPY INITIAL EVALUATION ADULT - ASSISTIVE DEVICE FOR TRANSFER: SIT/STAND, REHAB EVAL
PRINCIPAL DISCHARGE DIAGNOSIS  Diagnosis: Foot fracture, right, closed, initial encounter  Assessment and Plan of Treatment: rehab for 7-10 days and f/u with Podiatry possibly sx  ·Podiatry Will follow up w/ pt to book for Lisfranc repair as outpt  ·Pt will need to follow up with Dr. Schroeder or Dr. Alexandre w/i 1 week to begin pre-op planning, call [phone]  ·Posterior splint w/ webril applied, strict non weight bearing PT assist

## 2020-01-24 NOTE — PHYSICAL THERAPY INITIAL EVALUATION ADULT - BALANCE DISTURBANCE, IDENTIFIED IMPAIRMENT CONTRIBUTE, REHAB EVAL
decreased strength/impaired postural control Jean [place] MD Well appearing. No distress. Alert and active. Clear conj, PEERL, EOMI, TM's nl, pharynx benign, supple neck, FROM, chest clear, RRR, Benign abd, Nonfocal neuro, Reluctant to bear full weight on LE's and has limping gait, Legs appear nl, No clear tenderness to LE muscles or joints

## 2020-12-21 PROBLEM — Z87.440 HISTORY OF URINARY TRACT INFECTION: Status: RESOLVED | Noted: 2019-01-01 | Resolved: 2020-12-21

## 2022-06-08 NOTE — ED ADULT TRIAGE NOTE - BSA (M2)
Therapist and pt unable to meet due to issue with pt's insurance. Pt reports she should have it figured out within the week. 1.88

## 2023-07-24 NOTE — DISCHARGE NOTE NURSING/CASE MANAGEMENT/SOCIAL WORK - NSPROEXTENSIONSOFSELF_GEN_A_NUR
SW: New Skilled resumption Nursing rehab referral to [facility]        SW: Spoke to patient LEON Andrade. Lupe stated she spoke to patient daughter Jessica. Lupe stated Jessica request a resumption referral sent to [facility].   SW: Sent a skilled rehab referral to [facility] Via [facility]. Patient will need evicore auth. No DON screen needed.                 Timi Hernandez LSW    [phone]        none

## 2024-03-06 NOTE — H&P ADULT - PROBLEM SELECTOR PLAN 5
Thank you for the kind referral!  Best,  Evelin CT with decrease in size of collection, removal of Drain by IR?

## 2024-07-17 NOTE — ED PROVIDER NOTE - CONTEXT
7/17/2024      Izabella Og  [address] [place] [address]      Dear Colleague,    Thank you for referring your patient, Izabella Og, to the [facility]. Please see a copy of my visit note below.    Subjective:    7/3/24   Pt is seen today as a new pt for a diabetic foot exam.  Concerned about how her left hallux nail looks.   Somewhat discolored.  Denies purulence or odor.  Patient states that her right hallux lost portion of nail recently.  Denies erythema edema or pain.  Denies drainage.  Patient has diabetes and on dialysis now.  Wants to make sure she does not have any ulcers or infection.  She has peripheral neuropathy.  Patient has been on dialysis for 3 years now.  Had to have avulsion of right hallux nail over a year ago.  This is slowly growing out and she is having no problems with this.    7/17/24   patient is 2 weeks status post left hallux nail avulsion.  Denies any drainage.  Denies any purulence or odor.  Has noted nothing on her sock.  Has no pain at all.  She just had dialysis and she is somewhat tired today.  She has no other new complaints at all.    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  Finding compatible RBCs may take up to 24 hours or more.  Consult with the Blood Bank MD for transfusion guidance.     Doxycycline Hyclate Difficulty breathing, Fatigue, Other (See Comments) and Shortness Of Breath     Amoxicillin      Amoxicillin-Pot Clavulanate      GI upset       Dihydroxyaluminum Aminoacetate Unknown     Duloxetine      Flexeril [Cyclobenzaprine] Dizziness     Insulin Regular [Insulin]      Edema from insulins     Naprosyn [Naproxen]      Nsaids      Pramlintide      Pregabalin      Robaxin  [Methocarbamol]      Tolmetin Unknown     Metoprolol Fatigue  "      Current Outpatient Medications   Medication Sig Dispense Refill     acetaminophen (TYLENOL) 500 MG tablet Take 500-1,000 mg by mouth every 6 hours as needed for mild pain       amLODIPine (NORVASC) 5 MG tablet Take 1 tablet (5 mg) by mouth daily 90 tablet 2     apixaban ANTICOAGULANT (ELIQUIS ANTICOAGULANT) 5 MG tablet Take 1 tablet (5 mg) by mouth 2 times daily 60 tablet 5     atorvastatin (LIPITOR) 20 MG tablet Take 1 tablet (20 mg) by mouth daily 30 tablet 5     azelastine (OPTIVAR) 0.05 % ophthalmic solution Place 1 drop into both eyes 2 times daily as needed (for itchy eyes) 6 mL 11     B Complex-C-Folic Acid (SUMIT CAPS) 1 MG CAPS Take 1 capsule by mouth once daily 88 capsule 0     B-D SYRINGE/NEEDLE 25G X 5/8\" 3 ML MISC 1 Units by In Vitro route every 30 days 25 each 3     B-D ULTRA-FINE 33 LANCETS MISC 1 Stick by In Vitro route 2 times daily 200 each 3     blood glucose (NO BRAND SPECIFIED) test strip Use to test blood sugar 2 times daily (fasting and bedtime), or more as needed 200 strip 3     blood glucose monitoring (NO BRAND SPECIFIED) meter device kit Use to test blood sugar 2 times daily (fasting and bedtime), and more as needed 1 kit 1     calcitRIOL (ROCALTROL) 0.5 MCG capsule Take 2 capsules (1 mcg) by mouth daily 30 capsule 0     cyanocobalamin (CYANOCOBALAMIN) 1000 MCG/ML injection INJECT 1ML INTRAMUSCULARY ONCE EVERY 30 DAYS 1 mL 11     desonide (DESOWEN) 0.05 % external cream APPLY  CREAM TOPICALLY TO AFFECTED AREA THREE TIMES DAILY AS NEEDED 60 g 0     finasteride (PROSCAR) 5 MG tablet Take 1 tablet (5 mg) by mouth daily 90 tablet 3     gabapentin (NEURONTIN) 300 MG capsule Take 1 capsule (300 mg) by mouth At Bedtime 90 capsule 1     ketoconazole (NIZORAL) 2 % external cream APPLY TO FLAKEY AREAS ON FACE, CHEST, AND BACK TWICE DAILY 60 g 0     lidocaine (XYLOCAINE) 5 % external ointment Apply topically every 4 hours as needed for moderate pain 35 g 0     lidocaine-prilocaine (EMLA) 2.5-2.5 " % external cream Apply topically three times a week 30-45 minutes prior to dialysis. 60 g 11     loperamide (IMODIUM) 2 MG capsule Take 1-2 capsules (2-4 mg) by mouth every 6 (six) hours as needed for diarrhea. 25 capsule 0     midodrine (PROAMATINE) 5 MG tablet Take 5 mg by mouth 3 times daily Use 1 tablet on days of dialysis as needed for hypotension. 60 tablet 3     multivitamin RENAL (RENAVITE RX/NEPHROVITE) 1 tablet tablet Take 1 tablet by mouth daily 90 tablet 3     triamcinolone (KENALOG) 0.1 % external lotion Apply sparingly to affected area three times daily as needed. 120 mL 0     vitamin A 3 MG (69042 UNITS) capsule Take 1 capsule (10,000 Units) by mouth daily 90 capsule 3     VITAMIN B-1 100 MG tablet TAKE 1 TABLET BY MOUTH ONCE DAILY 90 tablet 1     vitamin D2 (ERGOCALCIFEROL) 60452 units (1250 mcg) capsule Take 1 capsule by mouth once a week 12 capsule 0       Patient Active Problem List   Diagnosis     Type II diabetes mellitus with peripheral artery disease (H)     Intermittent asthma     Diabetes mellitus with background retinopathy (H)     Nevus RLL     CHRISTINE (obstructive sleep apnea)     RLS (restless legs syndrome)     CME (cystoid macular edema) OU     Diabetic retinopathy (H)     Diabetic macular edema (H)     Low, vision, both eyes     Abnormal antinuclear antibody titer     Vitamin D deficiency     Neutropenia (H24)     Intestinal malabsorption     S/P gastric bypass     Diabetic polyneuropathy (H)     Secondary renal hyperparathyroidism (H24)     Polyneuropathy     Other inflammatory and immune myopathies, NEC     Voltager Sensitive Potassium Channel     Advance care planning     Disorder of immune system (H24)     Orthostatic hypotension     Dizziness     Adenocarcinoma of transverse colon (H)     Adenocarcinoma of colon (H)     Voltage-gated potassium channel (VGKC) antibody syndrome     Acute motor and sensory axonal neuropathy     Abnormal antineutrophil cytoplasmic antibody test      Malignant neoplasm of transverse colon (H)     Seborrheic dermatitis     S/P arteriovenous (AV) fistula creation     Vitamin B12 deficiency (non anemic)     Dyspnea on exertion     Elevated serum creatinine     Anemia of chronic renal failure, stage 5 (H)     Abdominal cramping     History of anemia due to CKD     ESRD (end stage renal disease) on dialysis (H)     Chronic diarrhea     Labile blood pressure     Light headedness     At moderate risk for fall     Loss of hair     H/O colon cancer, stage II     Autoimmune neutropenia (H24)     Coronary artery disease involving native coronary artery without angina pectoris     Hypomagnesemia     Status post coronary angiogram     Syncope and collapse     Hyperkalemia     Problem with dialysis access, initial encounter (H24)       Past Medical History:   Diagnosis Date     Anemia      Autoimmune neutropenia (H24)      BACKGROUND DIABETIC RETINOPATHY SP focal PC OD (JJ) 04/07/2011     Bilateral Cataract - mild 11/17/2010     Carpal tunnel syndrome 10/14/2010     CKD (chronic kidney disease)      Colon cancer (H)      Coronary artery disease involving native coronary artery with other form of angina pectoris, unspecified whether native or transplanted heart (H24) 02/20/2020     Coronary artery disease involving native coronary artery without angina pectoris      Depressive disorder 02/16/2017     H/O colon cancer, stage II      History of blood transfusion 02/20/2015    [place] [facility]     Hypertension 12/27/2016    Low Pressure     Hypomagnesemia      Imbalance      Incisional hernia 04/2019    x3     Intermittent asthma 11/17/2010     Kidney problem 1998     Lesion of ulnar nerve 10/14/2010     Malabsorption syndrome 12/15/2011     Neuropathy      Orthostatic hypotension      CHRISTINE (obstructive sleep apnea) 09/07/2011     Pneumonia due to 2019 novel coronavirus      Reduced vision 2003     RLS (restless legs syndrome) 09/07/2011     S/P gastric bypass       Syncope      Syncope, unspecified syncope type 5/7/2023     Thyroid disease 08/23/2016    [facility] - Dr. Ackerman     Type 2 diabetes mellitus with diabetic chronic kidney disease (H)      Vitamin D deficiency        Past Surgical History:   Procedure Laterality Date     ARTHROSCOPY KNEE RT/LT       BACK SURGERY       BIOPSY      kidney, [facility]     CHOLECYSTECTOMY, LAPOROSCOPIC  1998    Cholecystectomy, Laparoscopic     COLECTOMY  04/2017    mod differientiated adenoCA     COLONOSCOPY  01/2013    [place] Gastric     CREATE FISTULA ARTERIOVENOUS UPPER EXTREMITY  12/16/2011    Procedure:CREATE FISTULA ARTERIOVENOUS UPPER EXTREMITY; LEFT FOREARM BRESCIA  ARTERIOVENOUS FISTULA ; Surgeon:OUMAR BILLS; Location: OR     CREATE GRAFT LOOP ARTERIOVENOUS UPPER EXTREMITY Left 07/16/2021    Procedure: CREATION, FISTULA, ARTERIOVENOUS, LEFT UPPER EXTREMITY, with ligation of left radialcephalic fistula;  Surgeon: Latisha Salazar MD;  Location: [facility] OR     CV CORONARY ANGIOGRAM N/A 02/08/2023    Procedure: Coronary Angiogram;  Surgeon: Aaron Majano MD;  Location: [facility] HEART CARD[facility]AC CATH LAB     ESOPHAGOSCOPY, GASTROSCOPY, DUODENOSCOPY (EGD), COMBINED  10/07/2013    Procedure: COMBINED ESOPHAGOSCOPY, GASTROSCOPY, DUODENOSCOPY (EGD), BIOPSY SINGLE OR MULTIPLE;;  Surgeon: Duane, William Charles, MD;  Location: [facility] OR     EXAM UNDER ANESTHESIA, LASER DIODE RETINA, COMBINED       IR CVC NON TUNNEL PLACEMENT > 5 YRS  06/05/2023     IR CVC TUNNEL PLACEMENT > 5 YRS OF AGE  12/21/2020     IR CVC TUNNEL PLACEMENT > 5 YRS OF AGE  06/06/2023     IR CVC TUNNEL REMOVAL LEFT  11/22/2021     IR DIALYSIS FISTULOGRAM LEFT  06/06/2023     LAPAROSCOPIC BYPASS GASTRIC  02/28/2011     LIVER BIOPSY  12/01/2015     MIDLINE DOUBLE LUMEN PLACEMENT Right 01/17/2021    Basilic 20 cm     PHACOEMULSIFICATION CLEAR CORNEA WITH STANDARD INTRAOCULAR LENS IMPLANT  09/11/2010    RT/ LT eye     REPAIR FISTULA ARTERIOVENOUS UPPER EXTREMITY  03/07/2012     Procedure:REPAIR FISTULA ARTERIOVENOUS UPPER EXTREMITY; LEFT ARM VEIN PATCH ARTERIOVENOUS FISTULA WITH LIGATION OF SIDE BRANCH; Surgeon:OUMAR BILLS; Location: [facility]     SMALL BOWEL RESECTION  07/2023     SOFT TISSUE SURGERY       SURGICAL HISTORY OF -       tumor removed from bladder.     TUBAL/ECTOPIC PREGNANCY       x 2       Family History   Problem Relation Age of Onset     Cancer Mother      Colon Cancer Mother         Myself     Diabetes Father      Cancer Father      Diabetes Sister      Breast Cancer Sister      Hypertension No family hx of      Cerebrovascular Disease No family hx of      Thyroid Disease No family hx of         ,     Glaucoma No family hx of      Macular Degeneration No family hx of      Unknown/Adopted No family hx of      Family History Negative No family hx of      Asthma No family hx of      C.A.D. No family hx of      Breast Cancer No family hx of      Cancer - colorectal No family hx of      Prostate Cancer No family hx of      Alcohol/Drug No family hx of      Allergies No family hx of      Alzheimer Disease No family hx of      Anesthesia Reaction No family hx of      Arthritis No family hx of      Blood Disease No family hx of      Cardiovascular No family hx of      Circulatory No family hx of      Congenital Anomalies No family hx of      Connective Tissue Disorder No family hx of      Depression No family hx of      Endocrine Disease No family hx of      Eye Disorder No family hx of      Genetic Disorder No family hx of      Gastrointestinal Disease No family hx of      Genitourinary Problems No family hx of      Gynecology No family hx of      Heart Disease No family hx of      Lipids No family hx of      Musculoskeletal Disorder No family hx of      Neurologic Disorder No family hx of      Obesity No family hx of      Osteoporosis No family hx of      Psychotic Disorder No family hx of      Respiratory No family hx of      Hearing Loss No family hx of      Skin  Cancer No family hx of      Melanoma No family hx of        Social History     Tobacco Use     Smoking status: Never     Smokeless tobacco: Never   Substance Use Topics     Alcohol use: No     Alcohol/week: 0.0 standard drinks of alcohol         Exam:    Vitals: /70   Pulse 86   BMI: There is no height or weight on file to calculate BMI.  Height: Data Unavailable    Constitutional/ general:  Pt is in no apparent distress, appears well-nourished.  Cooperative with history and physical exam.     Psych:  The patient answered questions appropriately.  Normal affect.  Seems to have reasonable expectations, in terms of treatment.     Lungs:  Non labored breathing, non labored speech. No cough.  No audible wheezing. Even, quiet breathing.       Vascular:  positive  DP pulse.  negative PT pulse.  CFT < 3 sec.  positive ankle edema.  positive varicosities.  negative pedal hair growth.    Neuro:  Alert and oriented x 3.  Monofilament absent to ankles bilaterally.    Derm:  Skin thin, shiny, atrophic, with no hair growth noted.   Patient's right hallux nail is now growing in a senior care.  Left hallux nail nailbed is intact and healthy now.  No odor.  No purulence or drainage.  No erythema or edema.  No breakdown noted in either foot.    Musculoskeletal:    Lower extremity muscle strength is normal.  Patient is ambulatory without an assistive device or brace.  No gross deformities.  Normal arch.        A/P  Diabetes mellitus with LOPS  Status post left hallux nail avulsion    Discussed left hallux nail bed has no open wounds and is healed.  Skin somewhat friable still.  Will keep this protected.  Will watch both feet carefully for any signs of breakdown.  Will contact me if they notice any problems.  RTC as needed.    Matt Marion DPM, FACFAS             Again, thank you for allowing me to participate in the care of your patient.        Sincerely,        Matt Marion DPM   known (describe)/drainage complications